# Patient Record
Sex: FEMALE | Race: WHITE | NOT HISPANIC OR LATINO | Employment: OTHER | URBAN - NONMETROPOLITAN AREA
[De-identification: names, ages, dates, MRNs, and addresses within clinical notes are randomized per-mention and may not be internally consistent; named-entity substitution may affect disease eponyms.]

---

## 2017-02-23 ENCOUNTER — HOSPITAL ENCOUNTER (OUTPATIENT)
Dept: MRI IMAGING | Facility: HOSPITAL | Age: 77
Discharge: HOME | End: 2017-02-23
Attending: INTERNAL MEDICINE | Admitting: INTERNAL MEDICINE

## 2017-02-23 ENCOUNTER — TRANSCRIBE ORDERS (OUTPATIENT)
Dept: ADMINISTRATIVE | Facility: HOSPITAL | Age: 77
End: 2017-02-23

## 2017-02-23 DIAGNOSIS — M54.50 ACUTE LOW BACK PAIN WITHOUT SCIATICA, UNSPECIFIED BACK PAIN LATERALITY: Primary | ICD-10-CM

## 2017-02-23 DIAGNOSIS — M54.50 ACUTE LOW BACK PAIN WITHOUT SCIATICA, UNSPECIFIED BACK PAIN LATERALITY: ICD-10-CM

## 2017-02-23 PROCEDURE — 72148 MRI LUMBAR SPINE W/O DYE: CPT

## 2017-02-23 PROCEDURE — 72148 MRI LUMBAR SPINE W/O DYE: CPT | Performed by: RADIOLOGY

## 2017-03-09 RX ORDER — AMLODIPINE BESYLATE 5 MG/1
5 TABLET ORAL DAILY
COMMUNITY
Start: 2016-02-22 | End: 2018-06-29 | Stop reason: DRUGHIGH

## 2017-03-09 RX ORDER — PYRAZINAMIDE 500 MG/1
1 TABLET ORAL ONCE AS NEEDED
COMMUNITY
Start: 2016-12-12 | End: 2018-02-28

## 2017-03-09 RX ORDER — HYDROXYCHLOROQUINE SULFATE 200 MG/1
200 TABLET, FILM COATED ORAL 2 TIMES DAILY
COMMUNITY
Start: 2017-01-30

## 2017-03-09 RX ORDER — LORAZEPAM 0.5 MG/1
TABLET ORAL DAILY PRN
COMMUNITY
Start: 2017-02-16 | End: 2018-02-28

## 2017-03-09 RX ORDER — CETIRIZINE HYDROCHLORIDE 10 MG/1
10 TABLET ORAL DAILY
COMMUNITY
Start: 2016-04-07

## 2017-03-09 RX ORDER — HYDROCHLOROTHIAZIDE 25 MG/1
TABLET ORAL
COMMUNITY
Start: 2016-07-18 | End: 2018-02-28

## 2017-03-09 RX ORDER — GABAPENTIN 300 MG/1
1 CAPSULE ORAL DAILY PRN
COMMUNITY
Start: 2016-07-15 | End: 2018-02-28

## 2017-03-09 RX ORDER — ESCITALOPRAM OXALATE 20 MG/1
20 TABLET ORAL NIGHTLY
COMMUNITY
Start: 2015-08-26

## 2017-03-09 RX ORDER — MONTELUKAST SODIUM 10 MG/1
TABLET ORAL
COMMUNITY
Start: 2016-12-12 | End: 2017-03-10

## 2017-03-09 RX ORDER — PANTOPRAZOLE SODIUM 40 MG/1
40 TABLET, DELAYED RELEASE ORAL 2 TIMES DAILY
COMMUNITY
Start: 2016-08-09

## 2017-03-10 ENCOUNTER — OFFICE VISIT (OUTPATIENT)
Dept: NEUROSURGERY | Facility: CLINIC | Age: 77
End: 2017-03-10

## 2017-03-10 VITALS — HEIGHT: 66 IN | WEIGHT: 137 LBS | BODY MASS INDEX: 22.02 KG/M2 | TEMPERATURE: 97.7 F

## 2017-03-10 DIAGNOSIS — M48.061 SPINAL STENOSIS OF LUMBAR REGION: ICD-10-CM

## 2017-03-10 DIAGNOSIS — M51.36 DEGENERATIVE DISC DISEASE, LUMBAR: ICD-10-CM

## 2017-03-10 DIAGNOSIS — M43.16 SPONDYLOLISTHESIS OF LUMBAR REGION: Primary | ICD-10-CM

## 2017-03-10 DIAGNOSIS — M47.816 FACET ARTHRITIS OF LUMBAR REGION: ICD-10-CM

## 2017-03-10 PROCEDURE — 99203 OFFICE O/P NEW LOW 30 MIN: CPT | Performed by: NEUROLOGICAL SURGERY

## 2017-03-10 RX ORDER — HYDROCODONE BITARTRATE AND ACETAMINOPHEN 5; 325 MG/1; MG/1
1 TABLET ORAL
Refills: 0 | COMMUNITY
Start: 2017-02-24 | End: 2018-02-28

## 2017-03-10 RX ORDER — ESTRADIOL 1 MG/1
1 TABLET ORAL DAILY
Refills: 12 | COMMUNITY
Start: 2017-02-10 | End: 2017-10-31 | Stop reason: SDUPTHER

## 2017-03-10 NOTE — PROGRESS NOTES
Patient: Kelsey Avendano  : 1940    Primary Care Provider: Gunner Christie MD    Requesting Provider: As above        History    Chief Complaint: Mid and low back pain, improved.    History of Present Illness: Ms. Avendano is a 77-year-old retired woman who in the remote past had some very low-grade intermittent back difficulties but her current symptoms began several weeks ago.  She's had low back pain extending up into the mid back.  She harbored some flank pain as well.  The pain even extended into her lower abdominal region.  She's not had any leg symptoms to speak of including pain, weakness, or sensory alteration.  She attributes her symptoms to coming off of her Celebrex presumably due to some renal insufficiency.  She's done physical therapy for a couple weeks and was started on prednisone and her pain is dramatically better.  When present her pain was worse with sweeping, mopping, or standing for long periods of time.    Review of Systems   Constitutional: Negative for activity change, appetite change, chills, diaphoresis, fatigue, fever and unexpected weight change.   HENT: Negative for congestion, dental problem, drooling, ear discharge, ear pain, facial swelling, hearing loss, mouth sores, nosebleeds, postnasal drip, rhinorrhea, sinus pressure, sneezing, sore throat, tinnitus, trouble swallowing and voice change.    Eyes: Negative for photophobia, pain, discharge, redness, itching and visual disturbance.   Respiratory: Negative for apnea, cough, choking, chest tightness, shortness of breath, wheezing and stridor.    Cardiovascular: Negative for chest pain, palpitations and leg swelling.   Gastrointestinal: Negative for abdominal distention, abdominal pain, anal bleeding, blood in stool, constipation, diarrhea, nausea, rectal pain and vomiting.   Endocrine: Negative for cold intolerance, heat intolerance, polydipsia, polyphagia and polyuria.   Genitourinary: Negative for decreased urine volume,  "difficulty urinating, dysuria, enuresis, flank pain, frequency, genital sores, hematuria and urgency.   Musculoskeletal: Positive for arthralgias, back pain, gait problem, joint swelling, myalgias, neck pain and neck stiffness.   Skin: Negative for color change, pallor, rash and wound.   Allergic/Immunologic: Negative for environmental allergies, food allergies and immunocompromised state.   Neurological: Positive for light-headedness. Negative for dizziness, tremors, seizures, syncope, facial asymmetry, speech difficulty, weakness, numbness and headaches.   Hematological: Negative for adenopathy. Does not bruise/bleed easily.   Psychiatric/Behavioral: Negative for agitation, behavioral problems, confusion, decreased concentration, dysphoric mood, hallucinations, self-injury, sleep disturbance and suicidal ideas. The patient is not nervous/anxious and is not hyperactive.        The patient's past medical history, past surgical history, family history, and social history have been reviewed at length in the electronic medical record.    Physical Exam:   Visit Vitals   • Temp 97.7 °F (36.5 °C)   • Ht 66\" (167.6 cm)   • Wt 137 lb (62.1 kg)   • BMI 22.11 kg/m2     CONSTITUTIONAL: Patient is well-nourished, pleasant and appears stated age.  CV: Heart regular rate and rhythm without murmur, rub, or gallop.  PULMONARY: Lungs are clear to ascultation.  MUSCULOSKELETAL:  Straight leg raising is negative.  Richard's Sign is negative.  ROM in back normal.  Tenderness in the back to palpation is not observed.  NEUROLOGICAL:  Orientation, memory, attention span, language function, and cognition have been examined and are intact.  Strength is intact in the lower extremities to direct testing.  Muscle tone is normal throughout.  Station and gait are normal.  Sensation is intact to light touch testing throughout.  Deep tendon reflexes are 1+ and symmetrical except the left ankle reflex which is difficult to elicit.  Coordination is " intact.      Medical Decision Making    Data Review:   MRI of the lumbar spine dated 2/23/2017 reveals multilevel degenerative disc disease.  There is marked disc space narrowing at L2-3.  There is a prominent grade 1 listhesis of L4 on L5 which results in pronounced stenosis.  There is some degree of stenosis at other levels as well.    Diagnosis:   Mechanical low back pain due to extensive degenerative change, now improved.    Treatment Options:   Fortunately, the patient is markedly improved.  She should continue her current medications and therapy.  If her symptoms recur in a substantial fashion particularly when she comes off of her steroids then I've asked that she contact me and I'll make a referral to pain management for an epidural injection or 2.  Surgery on her back should be a last resort.       Diagnosis Plan   1. Spondylolisthesis of lumbar region     2. Spinal stenosis of lumbar region     3. Facet arthritis of lumbar region     4. Degenerative disc disease, lumbar         Scribed for Sudhir Carlos MD by Elvira Jean CMA on 03/10/2017 at 3:06 PM    I, Dr. Carlos, personally performed the services described in the documentation, as scribed in my presence, and it is both accurate and complete.

## 2017-04-13 ENCOUNTER — TRANSCRIBE ORDERS (OUTPATIENT)
Dept: OBSTETRICS AND GYNECOLOGY | Facility: CLINIC | Age: 77
End: 2017-04-13

## 2017-04-13 DIAGNOSIS — Z12.31 VISIT FOR SCREENING MAMMOGRAM: Primary | ICD-10-CM

## 2017-04-21 ENCOUNTER — HOSPITAL ENCOUNTER (OUTPATIENT)
Dept: MAMMOGRAPHY | Facility: HOSPITAL | Age: 77
Discharge: HOME | End: 2017-04-21
Attending: OBSTETRICS & GYNECOLOGY | Admitting: OBSTETRICS & GYNECOLOGY

## 2017-04-21 DIAGNOSIS — Z12.31 VISIT FOR SCREENING MAMMOGRAM: ICD-10-CM

## 2017-04-21 PROCEDURE — G0202 SCR MAMMO BI INCL CAD: HCPCS | Performed by: RADIOLOGY

## 2017-04-21 PROCEDURE — 77063 BREAST TOMOSYNTHESIS BI: CPT | Performed by: RADIOLOGY

## 2017-04-21 PROCEDURE — G0202 SCR MAMMO BI INCL CAD: HCPCS

## 2017-04-21 PROCEDURE — 77063 BREAST TOMOSYNTHESIS BI: CPT

## 2017-04-24 PROBLEM — Z01.419 WELL WOMAN EXAM WITH ROUTINE GYNECOLOGICAL EXAM: Status: ACTIVE | Noted: 2017-04-24

## 2017-10-31 ENCOUNTER — TELEPHONE (OUTPATIENT)
Dept: OBSTETRICS AND GYNECOLOGY | Facility: CLINIC | Age: 77
End: 2017-10-31

## 2017-10-31 RX ORDER — ESTRADIOL 1 MG/1
1 TABLET ORAL DAILY
Qty: 30 TABLET | Refills: 3 | Status: SHIPPED | OUTPATIENT
Start: 2017-10-31 | End: 2018-03-01 | Stop reason: HOSPADM

## 2018-01-26 PROBLEM — N81.6 CYSTOCELE WITH RECTOCELE: Status: ACTIVE | Noted: 2018-01-26

## 2018-01-26 PROBLEM — F32.A DEPRESSION: Status: ACTIVE | Noted: 2018-01-26

## 2018-01-26 PROBLEM — K21.9 GASTROESOPHAGEAL REFLUX DISEASE: Status: ACTIVE | Noted: 2018-01-26

## 2018-01-26 PROBLEM — N81.10 CYSTOCELE WITH RECTOCELE: Status: ACTIVE | Noted: 2018-01-26

## 2018-02-27 ENCOUNTER — HOSPITAL ENCOUNTER (OUTPATIENT)
Facility: HOSPITAL | Age: 78
Setting detail: OBSERVATION
Discharge: HOME | End: 2018-03-01
Attending: EMERGENCY MEDICINE | Admitting: INTERNAL MEDICINE

## 2018-02-27 ENCOUNTER — APPOINTMENT (OUTPATIENT)
Dept: GENERAL RADIOLOGY | Facility: HOSPITAL | Age: 78
End: 2018-02-27

## 2018-02-27 ENCOUNTER — APPOINTMENT (OUTPATIENT)
Dept: CT IMAGING | Facility: HOSPITAL | Age: 78
End: 2018-02-27

## 2018-02-27 DIAGNOSIS — G45.9 TRANSIENT CEREBRAL ISCHEMIA, UNSPECIFIED TYPE: Primary | ICD-10-CM

## 2018-02-27 LAB
BASOPHILS # BLD AUTO: 0.03 10*3/MM3 (ref 0–0.3)
BASOPHILS NFR BLD AUTO: 0.5 % (ref 0–2)
BILIRUB UR QL STRIP: NEGATIVE
CLARITY UR: CLEAR
COLOR UR: YELLOW
DEPRECATED RDW RBC AUTO: 43.1 FL (ref 37–54)
EOSINOPHIL # BLD AUTO: 0.38 10*3/MM3 (ref 0–0.7)
EOSINOPHIL NFR BLD AUTO: 5.9 % (ref 0–7)
ERYTHROCYTE [DISTWIDTH] IN BLOOD BY AUTOMATED COUNT: 13.3 % (ref 11.5–14.5)
GLUCOSE UR STRIP-MCNC: NEGATIVE MG/DL
HCT VFR BLD AUTO: 39.8 % (ref 37–47)
HGB BLD-MCNC: 13 G/DL (ref 12–16)
HGB UR QL STRIP.AUTO: NEGATIVE
IMM GRANULOCYTES # BLD: 0.01 10*3/MM3 (ref 0–0.03)
IMM GRANULOCYTES NFR BLD: 0.2 % (ref 0–0.5)
KETONES UR QL STRIP: NEGATIVE
LEUKOCYTE ESTERASE UR QL STRIP.AUTO: NEGATIVE
LYMPHOCYTES # BLD AUTO: 1.81 10*3/MM3 (ref 1–3)
LYMPHOCYTES NFR BLD AUTO: 28 % (ref 16–46)
MCH RBC QN AUTO: 29.8 PG (ref 27–33)
MCHC RBC AUTO-ENTMCNC: 32.7 G/DL (ref 33–37)
MCV RBC AUTO: 91.3 FL (ref 80–94)
MONOCYTES # BLD AUTO: 0.91 10*3/MM3 (ref 0.1–0.9)
MONOCYTES NFR BLD AUTO: 14.1 % (ref 0–12)
NEUTROPHILS # BLD AUTO: 3.33 10*3/MM3 (ref 1.4–6.5)
NEUTROPHILS NFR BLD AUTO: 51.3 % (ref 40–75)
NITRITE UR QL STRIP: NEGATIVE
PH UR STRIP.AUTO: 6.5 [PH] (ref 5–8)
PLATELET # BLD AUTO: 177 10*3/MM3 (ref 130–400)
PMV BLD AUTO: 10.4 FL (ref 6–10)
PROT UR QL STRIP: NEGATIVE
RBC # BLD AUTO: 4.36 10*6/MM3 (ref 4.2–5.4)
SP GR UR STRIP: <=1.005 (ref 1–1.03)
UROBILINOGEN UR QL STRIP: NORMAL
WBC NRBC COR # BLD: 6.47 10*3/MM3 (ref 4.5–12.5)

## 2018-02-27 PROCEDURE — 85025 COMPLETE CBC W/AUTO DIFF WBC: CPT | Performed by: PHYSICIAN ASSISTANT

## 2018-02-27 PROCEDURE — 99285 EMERGENCY DEPT VISIT HI MDM: CPT

## 2018-02-27 PROCEDURE — 80053 COMPREHEN METABOLIC PANEL: CPT | Performed by: PHYSICIAN ASSISTANT

## 2018-02-27 PROCEDURE — 70450 CT HEAD/BRAIN W/O DYE: CPT | Performed by: RADIOLOGY

## 2018-02-27 PROCEDURE — 82550 ASSAY OF CK (CPK): CPT | Performed by: PHYSICIAN ASSISTANT

## 2018-02-27 PROCEDURE — 93005 ELECTROCARDIOGRAM TRACING: CPT | Performed by: PHYSICIAN ASSISTANT

## 2018-02-27 PROCEDURE — 81003 URINALYSIS AUTO W/O SCOPE: CPT | Performed by: PHYSICIAN ASSISTANT

## 2018-02-27 PROCEDURE — 70450 CT HEAD/BRAIN W/O DYE: CPT

## 2018-02-27 PROCEDURE — 71046 X-RAY EXAM CHEST 2 VIEWS: CPT

## 2018-02-27 PROCEDURE — 82553 CREATINE MB FRACTION: CPT | Performed by: PHYSICIAN ASSISTANT

## 2018-02-27 PROCEDURE — 84484 ASSAY OF TROPONIN QUANT: CPT | Performed by: PHYSICIAN ASSISTANT

## 2018-02-27 PROCEDURE — 71046 X-RAY EXAM CHEST 2 VIEWS: CPT | Performed by: RADIOLOGY

## 2018-02-27 RX ORDER — SODIUM CHLORIDE 0.9 % (FLUSH) 0.9 %
10 SYRINGE (ML) INJECTION AS NEEDED
Status: DISCONTINUED | OUTPATIENT
Start: 2018-02-27 | End: 2018-03-01 | Stop reason: HOSPADM

## 2018-02-27 RX ORDER — ASPIRIN 81 MG/1
324 TABLET, CHEWABLE ORAL ONCE
Status: COMPLETED | OUTPATIENT
Start: 2018-02-27 | End: 2018-02-27

## 2018-02-27 RX ADMIN — ASPIRIN 324 MG: 81 TABLET, CHEWABLE ORAL at 23:31

## 2018-02-28 ENCOUNTER — APPOINTMENT (OUTPATIENT)
Dept: CARDIOLOGY | Facility: HOSPITAL | Age: 78
End: 2018-02-28
Attending: INTERNAL MEDICINE

## 2018-02-28 ENCOUNTER — APPOINTMENT (OUTPATIENT)
Dept: MRI IMAGING | Facility: HOSPITAL | Age: 78
End: 2018-02-28

## 2018-02-28 PROBLEM — G45.9 TIA (TRANSIENT ISCHEMIC ATTACK): Status: ACTIVE | Noted: 2018-02-28

## 2018-02-28 LAB
ALBUMIN SERPL-MCNC: 4.4 G/DL (ref 3.4–4.8)
ALBUMIN/GLOB SERPL: 1.4 G/DL (ref 1.5–2.5)
ALP SERPL-CCNC: 81 U/L (ref 35–104)
ALT SERPL W P-5'-P-CCNC: 29 U/L (ref 10–36)
ANION GAP SERPL CALCULATED.3IONS-SCNC: 8.1 MMOL/L (ref 3.6–11.2)
AST SERPL-CCNC: 51 U/L (ref 10–30)
BH CV ECHO MEAS - % IVS THICK: 8.3 %
BH CV ECHO MEAS - % LVPW THICK: 69.2 %
BH CV ECHO MEAS - ACS: 1.7 CM
BH CV ECHO MEAS - AI DEC SLOPE: 238.5 CM/SEC^2
BH CV ECHO MEAS - AI MAX PG: 60.7 MMHG
BH CV ECHO MEAS - AI MAX VEL: 389.4 CM/SEC
BH CV ECHO MEAS - AI P1/2T: 478.2 MSEC
BH CV ECHO MEAS - AO MAX PG: 8 MMHG
BH CV ECHO MEAS - AO MEAN PG: 4 MMHG
BH CV ECHO MEAS - AO ROOT AREA (BSA CORRECTED): 1.7
BH CV ECHO MEAS - AO ROOT AREA: 7.1 CM^2
BH CV ECHO MEAS - AO ROOT DIAM: 3 CM
BH CV ECHO MEAS - AO V2 MAX: 141.2 CM/SEC
BH CV ECHO MEAS - AO V2 MEAN: 91.5 CM/SEC
BH CV ECHO MEAS - AO V2 VTI: 32.2 CM
BH CV ECHO MEAS - BSA(HAYCOCK): 1.8 M^2
BH CV ECHO MEAS - BSA: 1.8 M^2
BH CV ECHO MEAS - BZI_BMI: 25 KILOGRAMS/M^2
BH CV ECHO MEAS - BZI_METRIC_HEIGHT: 165.1 CM
BH CV ECHO MEAS - BZI_METRIC_WEIGHT: 68 KG
BH CV ECHO MEAS - CONTRAST EF 4CH: 69 ML/M^2
BH CV ECHO MEAS - EDV(CUBED): 125.9 ML
BH CV ECHO MEAS - EDV(MOD-SP4): 71 ML
BH CV ECHO MEAS - EDV(TEICH): 118.9 ML
BH CV ECHO MEAS - EF(CUBED): 82.7 %
BH CV ECHO MEAS - EF(TEICH): 75.3 %
BH CV ECHO MEAS - ESV(CUBED): 21.8 ML
BH CV ECHO MEAS - ESV(MOD-SP4): 22 ML
BH CV ECHO MEAS - ESV(TEICH): 29.4 ML
BH CV ECHO MEAS - FS: 44.3 %
BH CV ECHO MEAS - IVS/LVPW: 0.99
BH CV ECHO MEAS - IVSD: 0.95 CM
BH CV ECHO MEAS - IVSS: 1 CM
BH CV ECHO MEAS - LA DIMENSION: 3.9 CM
BH CV ECHO MEAS - LA/AO: 1.3
BH CV ECHO MEAS - LV DIASTOLIC VOL/BSA (35-75): 40.6 ML/M^2
BH CV ECHO MEAS - LV MASS(C)D: 170.8 GRAMS
BH CV ECHO MEAS - LV MASS(C)DI: 97.6 GRAMS/M^2
BH CV ECHO MEAS - LV MASS(C)S: 115.9 GRAMS
BH CV ECHO MEAS - LV MASS(C)SI: 66.2 GRAMS/M^2
BH CV ECHO MEAS - LV SYSTOLIC VOL/BSA (12-30): 12.6 ML/M^2
BH CV ECHO MEAS - LVIDD: 5 CM
BH CV ECHO MEAS - LVIDS: 2.8 CM
BH CV ECHO MEAS - LVLD AP4: 6.7 CM
BH CV ECHO MEAS - LVLS AP4: 5 CM
BH CV ECHO MEAS - LVOT AREA (M): 2.5 CM^2
BH CV ECHO MEAS - LVOT AREA: 2.6 CM^2
BH CV ECHO MEAS - LVOT DIAM: 1.8 CM
BH CV ECHO MEAS - LVPWD: 0.95 CM
BH CV ECHO MEAS - LVPWS: 1.6 CM
BH CV ECHO MEAS - MV A MAX VEL: 43.4 CM/SEC
BH CV ECHO MEAS - MV E MAX VEL: 96.3 CM/SEC
BH CV ECHO MEAS - MV E/A: 2.2
BH CV ECHO MEAS - PA ACC SLOPE: 1127 CM/SEC^2
BH CV ECHO MEAS - PA ACC TIME: 0.09 SEC
BH CV ECHO MEAS - PA PR(ACCEL): 37.8 MMHG
BH CV ECHO MEAS - RAP SYSTOLE: 10 MMHG
BH CV ECHO MEAS - RVDD: 2.1 CM
BH CV ECHO MEAS - RVSP: 48.3 MMHG
BH CV ECHO MEAS - SI(AO): 131.1 ML/M^2
BH CV ECHO MEAS - SI(CUBED): 59.5 ML/M^2
BH CV ECHO MEAS - SI(MOD-SP4): 28 ML/M^2
BH CV ECHO MEAS - SI(TEICH): 51.1 ML/M^2
BH CV ECHO MEAS - SV(AO): 229.6 ML
BH CV ECHO MEAS - SV(CUBED): 104.1 ML
BH CV ECHO MEAS - SV(MOD-SP4): 49 ML
BH CV ECHO MEAS - SV(TEICH): 89.5 ML
BH CV ECHO MEAS - TR MAX VEL: 309.5 CM/SEC
BILIRUB SERPL-MCNC: 0.4 MG/DL (ref 0.2–1.8)
BUN BLD-MCNC: 20 MG/DL (ref 7–21)
BUN/CREAT SERPL: 20.6 (ref 7–25)
CALCIUM SPEC-SCNC: 9.5 MG/DL (ref 7.7–10)
CHLORIDE SERPL-SCNC: 103 MMOL/L (ref 99–112)
CK MB SERPL-CCNC: 10.96 NG/ML (ref 0–5)
CK MB SERPL-CCNC: 6.16 NG/ML (ref 0–5)
CK MB SERPL-CCNC: 8.01 NG/ML (ref 0–5)
CK MB SERPL-CCNC: 8.33 NG/ML (ref 0–5)
CK MB SERPL-RTO: 2.4 % (ref 0–3)
CK MB SERPL-RTO: 2.5 % (ref 0–3)
CK MB SERPL-RTO: 2.7 % (ref 0–3)
CK MB SERPL-RTO: 2.9 % (ref 0–3)
CK SERPL-CCNC: 261 U/L (ref 24–173)
CK SERPL-CCNC: 306 U/L (ref 24–173)
CK SERPL-CCNC: 317 U/L (ref 24–173)
CK SERPL-CCNC: 375 U/L (ref 24–173)
CO2 SERPL-SCNC: 28.9 MMOL/L (ref 24.3–31.9)
CREAT BLD-MCNC: 0.97 MG/DL (ref 0.43–1.29)
GFR SERPL CREATININE-BSD FRML MDRD: 56 ML/MIN/1.73
GLOBULIN UR ELPH-MCNC: 3.2 GM/DL
GLUCOSE BLD-MCNC: 89 MG/DL (ref 70–110)
HBA1C MFR BLD: 5.6 % (ref 4.5–5.7)
MAXIMAL PREDICTED HEART RATE: 143 BPM
MYOGLOBIN SERPL-MCNC: 116 NG/ML (ref 0–109)
MYOGLOBIN SERPL-MCNC: 147 NG/ML (ref 0–109)
MYOGLOBIN SERPL-MCNC: 176 NG/ML (ref 0–109)
OSMOLALITY SERPL CALC.SUM OF ELEC: 281.5 MOSM/KG (ref 273–305)
POTASSIUM BLD-SCNC: 3.8 MMOL/L (ref 3.5–5.3)
PROT SERPL-MCNC: 7.6 G/DL (ref 6–8)
SODIUM BLD-SCNC: 140 MMOL/L (ref 135–153)
STRESS TARGET HR: 122 BPM
TROPONIN I SERPL-MCNC: 0.01 NG/ML
TROPONIN I SERPL-MCNC: <0.006 NG/ML

## 2018-02-28 PROCEDURE — G8979 MOBILITY GOAL STATUS: HCPCS

## 2018-02-28 PROCEDURE — 70551 MRI BRAIN STEM W/O DYE: CPT

## 2018-02-28 PROCEDURE — 83036 HEMOGLOBIN GLYCOSYLATED A1C: CPT | Performed by: INTERNAL MEDICINE

## 2018-02-28 PROCEDURE — G0378 HOSPITAL OBSERVATION PER HR: HCPCS

## 2018-02-28 PROCEDURE — 97530 THERAPEUTIC ACTIVITIES: CPT

## 2018-02-28 PROCEDURE — 92610 EVALUATE SWALLOWING FUNCTION: CPT

## 2018-02-28 PROCEDURE — 83874 ASSAY OF MYOGLOBIN: CPT | Performed by: INTERNAL MEDICINE

## 2018-02-28 PROCEDURE — 70547 MR ANGIOGRAPHY NECK W/O DYE: CPT | Performed by: RADIOLOGY

## 2018-02-28 PROCEDURE — 84484 ASSAY OF TROPONIN QUANT: CPT | Performed by: INTERNAL MEDICINE

## 2018-02-28 PROCEDURE — 70547 MR ANGIOGRAPHY NECK W/O DYE: CPT

## 2018-02-28 PROCEDURE — G8997 SWALLOW GOAL STATUS: HCPCS

## 2018-02-28 PROCEDURE — 97116 GAIT TRAINING THERAPY: CPT

## 2018-02-28 PROCEDURE — G8996 SWALLOW CURRENT STATUS: HCPCS

## 2018-02-28 PROCEDURE — 97162 PT EVAL MOD COMPLEX 30 MIN: CPT

## 2018-02-28 PROCEDURE — G8980 MOBILITY D/C STATUS: HCPCS

## 2018-02-28 PROCEDURE — 93306 TTE W/DOPPLER COMPLETE: CPT

## 2018-02-28 PROCEDURE — 82553 CREATINE MB FRACTION: CPT | Performed by: INTERNAL MEDICINE

## 2018-02-28 PROCEDURE — 99220 PR INITIAL OBSERVATION CARE/DAY 70 MINUTES: CPT | Performed by: INTERNAL MEDICINE

## 2018-02-28 PROCEDURE — 70551 MRI BRAIN STEM W/O DYE: CPT | Performed by: RADIOLOGY

## 2018-02-28 PROCEDURE — 82550 ASSAY OF CK (CPK): CPT | Performed by: INTERNAL MEDICINE

## 2018-02-28 PROCEDURE — G8978 MOBILITY CURRENT STATUS: HCPCS

## 2018-02-28 PROCEDURE — G8998 SWALLOW D/C STATUS: HCPCS

## 2018-02-28 RX ORDER — ATORVASTATIN CALCIUM 40 MG/1
80 TABLET, FILM COATED ORAL NIGHTLY
Status: DISCONTINUED | OUTPATIENT
Start: 2018-02-28 | End: 2018-03-01 | Stop reason: HOSPADM

## 2018-02-28 RX ORDER — HYDROXYCHLOROQUINE SULFATE 200 MG/1
200 TABLET, FILM COATED ORAL DAILY
Status: DISCONTINUED | OUTPATIENT
Start: 2018-02-28 | End: 2018-03-01 | Stop reason: HOSPADM

## 2018-02-28 RX ORDER — CELECOXIB 200 MG/1
200 CAPSULE ORAL DAILY
Status: DISCONTINUED | OUTPATIENT
Start: 2018-02-28 | End: 2018-03-01 | Stop reason: HOSPADM

## 2018-02-28 RX ORDER — SODIUM CHLORIDE 0.9 % (FLUSH) 0.9 %
1-10 SYRINGE (ML) INJECTION AS NEEDED
Status: DISCONTINUED | OUTPATIENT
Start: 2018-02-28 | End: 2018-03-01 | Stop reason: HOSPADM

## 2018-02-28 RX ORDER — ASPIRIN 81 MG/1
81 TABLET ORAL DAILY
Status: ON HOLD | COMMUNITY
End: 2022-05-09

## 2018-02-28 RX ORDER — ESTRADIOL 1 MG/1
1 TABLET ORAL DAILY
Status: CANCELLED | OUTPATIENT
Start: 2018-02-28

## 2018-02-28 RX ORDER — ACETAMINOPHEN 325 MG/1
650 TABLET ORAL EVERY 4 HOURS PRN
Status: DISCONTINUED | OUTPATIENT
Start: 2018-02-28 | End: 2018-03-01 | Stop reason: HOSPADM

## 2018-02-28 RX ORDER — ACETAMINOPHEN 650 MG/1
650 SUPPOSITORY RECTAL EVERY 4 HOURS PRN
Status: DISCONTINUED | OUTPATIENT
Start: 2018-02-28 | End: 2018-03-01 | Stop reason: HOSPADM

## 2018-02-28 RX ORDER — PANTOPRAZOLE SODIUM 40 MG/1
40 TABLET, DELAYED RELEASE ORAL 2 TIMES DAILY
Status: DISCONTINUED | OUTPATIENT
Start: 2018-02-28 | End: 2018-03-01 | Stop reason: HOSPADM

## 2018-02-28 RX ORDER — CELECOXIB 200 MG/1
200 CAPSULE ORAL DAILY
COMMUNITY
End: 2020-04-13

## 2018-02-28 RX ORDER — ASPIRIN 81 MG/1
81 TABLET ORAL DAILY
Status: DISCONTINUED | OUTPATIENT
Start: 2018-02-28 | End: 2018-03-01 | Stop reason: HOSPADM

## 2018-02-28 RX ORDER — ESCITALOPRAM OXALATE 10 MG/1
20 TABLET ORAL DAILY
Status: DISCONTINUED | OUTPATIENT
Start: 2018-02-28 | End: 2018-03-01 | Stop reason: HOSPADM

## 2018-02-28 RX ORDER — ASPIRIN 81 MG/1
81 TABLET, CHEWABLE ORAL DAILY
COMMUNITY
End: 2018-02-28

## 2018-02-28 RX ORDER — AMLODIPINE BESYLATE 5 MG/1
7.5 TABLET ORAL DAILY
Status: CANCELLED | OUTPATIENT
Start: 2018-02-28

## 2018-02-28 RX ORDER — CETIRIZINE HYDROCHLORIDE 10 MG/1
5 TABLET ORAL DAILY
Status: DISCONTINUED | OUTPATIENT
Start: 2018-02-28 | End: 2018-03-01 | Stop reason: HOSPADM

## 2018-02-28 RX ADMIN — ATORVASTATIN CALCIUM 80 MG: 40 TABLET, FILM COATED ORAL at 20:46

## 2018-02-28 RX ADMIN — METOPROLOL TARTRATE 25 MG: 25 TABLET, FILM COATED ORAL at 08:59

## 2018-02-28 RX ADMIN — ASPIRIN 81 MG: 81 TABLET ORAL at 08:59

## 2018-02-28 RX ADMIN — PANTOPRAZOLE SODIUM 40 MG: 40 TABLET, DELAYED RELEASE ORAL at 09:00

## 2018-02-28 RX ADMIN — CELECOXIB 200 MG: 200 CAPSULE ORAL at 08:59

## 2018-02-28 RX ADMIN — CETIRIZINE HYDROCHLORIDE 5 MG: 10 TABLET ORAL at 08:59

## 2018-02-28 RX ADMIN — ESCITALOPRAM OXALATE 20 MG: 10 TABLET, FILM COATED ORAL at 09:00

## 2018-02-28 RX ADMIN — METOPROLOL TARTRATE 25 MG: 25 TABLET, FILM COATED ORAL at 20:46

## 2018-02-28 RX ADMIN — PANTOPRAZOLE SODIUM 40 MG: 40 TABLET, DELAYED RELEASE ORAL at 20:46

## 2018-02-28 RX ADMIN — HYDROXYCHLOROQUINE SULFATE 200 MG: 200 TABLET, FILM COATED ORAL at 09:00

## 2018-03-01 VITALS
RESPIRATION RATE: 18 BRPM | DIASTOLIC BLOOD PRESSURE: 75 MMHG | HEART RATE: 59 BPM | BODY MASS INDEX: 25.14 KG/M2 | OXYGEN SATURATION: 95 % | SYSTOLIC BLOOD PRESSURE: 152 MMHG | TEMPERATURE: 98.2 F | HEIGHT: 65 IN | WEIGHT: 150.9 LBS

## 2018-03-01 PROBLEM — G45.9 TIA (TRANSIENT ISCHEMIC ATTACK): Status: RESOLVED | Noted: 2018-02-28 | Resolved: 2018-03-01

## 2018-03-01 LAB
GLUCOSE BLDC GLUCOMTR-MCNC: 100 MG/DL (ref 70–130)
GLUCOSE BLDC GLUCOMTR-MCNC: 94 MG/DL (ref 70–130)

## 2018-03-01 PROCEDURE — 97530 THERAPEUTIC ACTIVITIES: CPT

## 2018-03-01 PROCEDURE — G0378 HOSPITAL OBSERVATION PER HR: HCPCS

## 2018-03-01 PROCEDURE — 97116 GAIT TRAINING THERAPY: CPT

## 2018-03-01 PROCEDURE — 82962 GLUCOSE BLOOD TEST: CPT

## 2018-03-01 PROCEDURE — 99217 PR OBSERVATION CARE DISCHARGE MANAGEMENT: CPT | Performed by: INTERNAL MEDICINE

## 2018-03-01 RX ORDER — ATORVASTATIN CALCIUM 80 MG/1
80 TABLET, FILM COATED ORAL NIGHTLY
Qty: 30 TABLET | Refills: 0 | Status: SHIPPED | OUTPATIENT
Start: 2018-03-01 | End: 2018-04-23 | Stop reason: SDUPTHER

## 2018-03-01 RX ADMIN — HYDROXYCHLOROQUINE SULFATE 200 MG: 200 TABLET, FILM COATED ORAL at 11:29

## 2018-03-01 RX ADMIN — ESCITALOPRAM OXALATE 20 MG: 10 TABLET, FILM COATED ORAL at 08:58

## 2018-03-01 RX ADMIN — CELECOXIB 200 MG: 200 CAPSULE ORAL at 08:57

## 2018-03-01 RX ADMIN — PANTOPRAZOLE SODIUM 40 MG: 40 TABLET, DELAYED RELEASE ORAL at 08:57

## 2018-03-01 RX ADMIN — METOPROLOL TARTRATE 25 MG: 25 TABLET, FILM COATED ORAL at 08:56

## 2018-03-01 RX ADMIN — ASPIRIN 81 MG: 81 TABLET ORAL at 08:58

## 2018-03-01 RX ADMIN — CETIRIZINE HYDROCHLORIDE 5 MG: 10 TABLET ORAL at 08:57

## 2018-03-01 NOTE — DISCHARGE SUMMARY
[facility] HOSPITALIST MEDICINE DISCHARGE SUMMARY    Patient Identification:  Name:  Kelsey Avendano  Age:  78 y.o.  Sex:  female  :  1940  MRN:  [id number]  Visit Number:  [id number]    Date of Admission: 2018  Date of Discharge:  3/1/2018     PCP: Gunner Christie MD    DISCHARGE DIAGNOSIS  · TIA  · Hypertension  · H/o CVA   · GERD  · Lupus  · Osteoarthritis  · Chronic diastolic CHF      CONSULTS   Neurology    PROCEDURES PERFORMED  18: 2-D echocardiogram that showed normal LV systolic function with an EF of 60-65%, grade 2 diastolic dysfunction mildly dilated right atrial cavity and moderate pulmonary hypertension  18: MRI of the brain that showed age-appropriate diffused generalized cerebral atrophy with gliosis in the white matter and eval defined old infarct in the cerebellum on the left with no acute areas of ischemia.  Family 2018: MRA of the neck which showed slightly tortuous vessels consistent with atherosclerotic changes and no focal areas of stenosis.  Antegrade flow in both vertebral arteries.  18: CT scan of the head without contrast that showed atrophic and chronic small vessel ischemic changes but no acute intracranial abnormalities.    REASON FOR ADMISSION  Patient is a 78 y.o. female presented to [facility] complaining of numbness.  Please see the admitting history and physical for further details.       HOSPITAL COURSE   Patient was admitted to telemetry with numbness that had resolved by the time patient had presented to the emergency department.  She was started on aspirin and high-dose statin and workup was ordered including MRI, MRA of the neck, 2-D echocardiogram and carotid Doppler study.  Patient had a CT scan done in the emergency department that showed no acute intracranial abnormality.  Patient did not had any further numbness or visual deficits during the course of her hospitalization.  She was found to have an old infarct on  her MRI as dictated above.  Patient was advised to continue aspirin and statin.  She was also found to have diastolic dysfunction on her echocardiogram but no evidence of acute decompensation.  Patient was continued on her home dose of metoprolol which she will continue.  She was able to ambulate with therapy without any problem and did not had any further neurological deficits during the course of hospitalization.  Patient however had complaint of off and on palpitations prior to her admission.  Telemetry rhythm was reviewed and it showed sinus rhythm with few PACs.  Patient is being discharged home in stable condition.  Her estradiol has been discontinued secondary to previous stroke and current TIA.  Patient was advised to monitor her blood pressure, blood glucose and cholesterol closely.  Patient also has been made appointment with cardiology for further evaluation for palpitations.      DISCHARGE DISPOSITION   Stable    DISCHARGE MEDICATIONS:   Kelsey Avendano   Home Medication Instructions [place]:[id number]    Printed on:03/01/18 5778   Medication Information                      amLODIPine (NORVASC) 5 MG tablet  Take 7.5 mg by mouth Daily.             aspirin 81 MG EC tablet  Take 81 mg by mouth Daily.             atorvastatin (LIPITOR) 80 MG tablet  Take 1 tablet by mouth Every Night.             celecoxib (CeleBREX) 200 MG capsule  Take 200 mg by mouth Daily.             cetirizine (zyrTEC) 10 MG tablet  Take 10 mg by mouth Daily.             escitalopram (LEXAPRO) 20 MG tablet  Take 20 mg by mouth Daily.             hydroxychloroquine (PLAQUENIL) 200 MG tablet  Take 200 mg by mouth Daily.             metoprolol tartrate (LOPRESSOR) 25 MG tablet  Take 25 mg by mouth 2 (Two) Times a Day.             pantoprazole (PROTONIX) 40 MG EC tablet  Take 40 mg by mouth 2 (Two) Times a Day.                 Diet Instructions     Diet: Regular       Discharge Diet:  Regular                 Activity Instructions      Activity as Tolerated                   Additional Instructions for the Follow-ups that You Need to Schedule     Discharge Follow-up with PCP    As directed    Follow Up Details:  1 week           Discharge Follow-up with Specified Provider: Cardiology; 2 Weeks    As directed    To:  Cardiology    Follow Up:  2 Weeks    Follow Up Details:  Palpitations, cerebrovascular disease                 Follow-up Information     Follow up with Gunner Christie MD .    Specialty:  Internal Medicine    Why:  1 week    Contact information:    [address] [place], [place] [address]  [facility] [address]  [phone]            TEST  RESULTS PENDING AT DISCHARGE       Gricel Miller MD  03/01/18  12:24 PM    Please note that this discharge summary required more than 30 minutes to complete.    Please send a copy of this dictation to the following providers:    Gunner Christie MD

## 2018-03-01 NOTE — THERAPY EVALUATION
Acute Care - Occupational Therapy Initial Evaluation   [facility]     Patient Name: Kelsey Avendano  : 1940  MRN: [id number]  Today's Date: 3/1/2018  Onset of Illness/Injury or Date of Surgery Date: 18     Referring Physician: Dr. White    Admit Date: 2018       ICD-10-CM ICD-9-CM   1. Transient cerebral ischemia, unspecified type G45.9 435.9     Patient Active Problem List   Diagnosis   • Annual GYN exam w/o problem   • Benign essential hypertension   • Depression   • Systemic lupus erythematosus   • Osteoporosis   • Gastroesophageal reflux disease   • Cystocele with rectocele   • TIA (transient ischemic attack)     Past Medical History:   Diagnosis Date   • Anemia    • Anxiety    • Aortic insufficiency    • Arthritis    • B12 deficiency    • Bradycardia    • Bronchitis    • Depression    • GERD (gastroesophageal reflux disease)    • Hypertension    • Insomnia    • Lupus    • MRSA (methicillin resistant staph aureus) culture positive    • Osteoporosis    • Renal failure      Past Surgical History:   Procedure Laterality Date   • ANTERIOR AND POSTERIOR VAGINAL REPAIR  2008    Along with vaginal vault suspension and PULLP   • BILATERAL SALPINGO OOPHORECTOMY Bilateral    • BREAST BIOPSY Right     benign   • DEEP NECK LYMPH NODE BIOPSY / EXCISION     • KNEE ARTHROPLASTY, PARTIAL REPLACEMENT Bilateral    • TOTAL ABDOMINAL HYSTERECTOMY FORDE PROCEDURE            OT ASSESSMENT FLOWSHEET (last 72 hours)      OT Evaluation       18 1200 18 1701 18 1025 18 0159 18 0156    Rehab Evaluation    Document Type evaluation  -KR evaluation  -BC       Subjective Information no complaints  -KR no complaints;agree to therapy  -BC       Patient Effort, Rehab Treatment  good  -BC       Symptoms Noted During/After Treatment  none  -BC       Symptoms Noted Comment Pt. presents with BUE WFL and reports all occurrances of numbness/incoordination have resolved at this  time. She has returned to independence with self care and transfers. No further skilled OT desired by pt. on acute level.   -KR        General Information    Patient Profile Review  yes  -BC       Onset of Illness/Injury or Date of Surgery Date  02/27/18  -BC       Referring Physician  Dr. White  -BC       General Observations  Pt. supine in bed, awake and alert, granddaughter in room  -BC       Prior Level of Function  independent:;community mobility;all household mobility  -BC       Equipment Currently Used at Home     none  -SS    Living Environment    Lives With  alone  -BC (P)  alone  -HS alone  -SS     Living Arrangements  house  -BC (P)  house  -HS house  -SS     Home Accessibility  no concerns  -BC (P)  no concerns  -HS no concerns  -SS     Stair Railings at Home  none  -BC  none  -     Type of Financial/Environmental Concern  none  -BC  none  -SS     Transportation Available  family or friend will provide  -BC (P)  car;family or friend will provide  -HS family or friend will provide  -SS     Clinical Impression    Therapy Frequency evaluation only  -KR        Functional Level Prior    Ambulation     0-->independent  -SS    Transferring     0-->independent  -SS    Toileting     0-->independent  -SS    Bathing     0-->independent  -SS    Dressing     0-->independent  -SS    Eating     0-->independent  -SS    Communication     0-->understands/communicates without difficulty  -    Swallowing     0-->swallows foods/liquids without difficulty  -    Pain Assessment    Pain Assessment  No/denies pain  -BC       Cognitive Assessment/Intervention    Current Cognitive/Communication Assessment  functional  -BC       Orientation Status  oriented x 4  -BC       Follows Commands/Answers Questions  able to follow single-step instructions  -BC       Personal Safety  WNL/WFL  -BC       Personal Safety Interventions  fall prevention program maintained;muscle strengthening facilitated;gait belt;nonskid shoes/slippers  when out of bed  -BC       ROM (Range of Motion)    General ROM  no range of motion deficits identified  -BC       MMT (Manual Muscle Testing)    General MMT Assessment  no strength deficits identified  -BC       Bed Mobility, Assessment/Treatment    Bed Mobility, Assistive Device  bed rails  -BC       Bed Mobility, Roll Left, [place]  independent  -BC       Bed Mobility, Roll Right, [place]  independent  -BC       Bed Mobility, Scoot/Bridge, [place]  independent  -BC       Bed Mob, Supine to Sit, [place]  independent  -BC       Bed Mob, Sit to Supine, [place]  independent  -BC       Transfer Assessment/Treatment    Transfers, Sit-Stand [place]  independent  -BC       Transfers, Stand-Sit [place]  independent  -BC       Transfers, Sit-Stand-Sit, Assist Device  other (see comments)   none  -BC       Positioning and Restraints    Pre-Treatment Position  in bed  -BC       Post Treatment Position  wheelchair  -BC       In Wheelchair  notified nsg;sitting;call light within reach;encouraged to call for assist   Pt. going for test with transporters  -BC         User Key  (r) = Recorded By, (t) = Taken By, (c) = Cosigned By    Initials Name Effective Dates    BC Shaina Taylor, PT 03/14/16 -     HORTENSIA Martínez OT 03/14/16 -     SS Beverly Skelton, RN 11/18/16 -      Ellen Negron -                 OT Recommendation and Plan  Therapy Frequency: evaluation only                 Outcome Measures       02/28/18 1700          How much help from another person do you currently need...    Turning from your back to your side while in flat bed without using bedrails? 4  -BC      Moving from lying on back to sitting on the side of a flat bed without bedrails? 4  -BC      Moving to and from a bed to a chair (including a wheelchair)? 4  -BC      Standing up from a chair using your arms (e.g., wheelchair, bedside chair)? 4  -BC      Climbing 3-5 steps with a railing? 3  -BC      To walk in hospital  room? 4  -BC      AM-PAC 6 Clicks Score 23  -BC      Functional Assessment    Outcome Measure Options AM-PAC 6 Clicks Basic Mobility (PT)  -BC        User Key  (r) = Recorded By, (t) = Taken By, (c) = Cosigned By    Initials Name Provider Type    BC Shaina Taylor, PT Physical Therapist          Time Calculation:                 Robert Martínez, OT  3/1/2018

## 2018-03-01 NOTE — THERAPY TREATMENT NOTE
Acute Care - Physical Therapy Treatment Note  [facility]     Patient Name: Kelsey Avendano  : 1940  MRN: [id number]  Today's Date: 3/1/2018  Onset of Illness/Injury or Date of Surgery Date: 18  Date of Referral to PT: 18  Referring Physician: Dr. White    Admit Date: 2018    Visit Dx:    ICD-10-CM ICD-9-CM   1. Transient cerebral ischemia, unspecified type G45.9 435.9     Patient Active Problem List   Diagnosis   • Annual GYN exam w/o problem   • Benign essential hypertension   • Depression   • Systemic lupus erythematosus   • Osteoporosis   • Gastroesophageal reflux disease   • Cystocele with rectocele               Adult Rehabilitation Note       18 1527          Rehab Assessment/Intervention    Discipline physical therapist  -BC      Document Type therapy note (daily note)  -BC      Subjective Information no complaints  -BC      Recorded by [BC] Shaina Taylor, PT      Cognitive Assessment/Intervention    Current Cognitive/Communication Assessment functional  -BC      Orientation Status oriented x 4  -BC      Recorded by [BC] Shaina Taylor, PT      Bed Mobility, Assessment/Treatment    Bed Mobility, Assistive Device bed rails  -BC      Bed Mobility, Roll Left, [place] independent  -BC      Bed Mobility, Roll Right, [place] independent  -BC      Bed Mobility, Scoot/Bridge, [place] independent  -BC      Bed Mob, Supine to Sit, [place] independent  -BC      Bed Mob, Sit to Supine, [place] independent  -BC      Recorded by [BC] Shaina Taylor, PT      Transfer Assessment/Treatment    Transfers, Sit-Stand [place] independent  -BC      Transfers, Stand-Sit [place] independent  -BC      Transfers, Sit-Stand-Sit, Assist Device other (see comments)   none  -BC      Recorded by [BC] Shaina Taylor, PT      Gait Assessment/Treatment    Gait, [place] Level conditional independence  -BC      Gait, Assistive Device other (see comments)   none  -BC       Gait, Distance (Feet) 200  -BC      Recorded by [BC] Shaina Taylor PT      Positioning and Restraints    Pre-Treatment Position in bed  -BC      Post Treatment Position chair  -BC      In Chair notified nsg;sitting;call light within reach;encouraged to call for assist;with family/caregiver  -BC      Recorded by [BC] Shaina aTylor PT        User Key  (r) = Recorded By, (t) = Taken By, (c) = Cosigned By    Initials Name Effective Dates    BC Shaina Taylor PT 03/14/16 -               Physical Therapy Education     Title: PT OT SLP Therapies (Resolved)     Topic: Physical Therapy (Resolved)     Point: Mobility training (Resolved)    Learning Progress Summary    Learner Readiness Method Response Comment Documented by Status   Patient Acceptance E [facility] 03/01/18 1100 Done    Acceptance E [facility] 02/28/18 1948 Done    Acceptance E [facility] 02/28/18 1719 Done               Point: Home exercise program (Resolved)    Learning Progress Summary    Learner Readiness Method Response Comment Documented by Status   Patient Acceptance E [facility] 03/01/18 1100 Done    Acceptance E [facility]   02/28/18 1948 Done    Acceptance E [facility]  BC 02/28/18 1719 Done               Point: Body mechanics (Resolved)    Learning Progress Summary    Learner Readiness Method Response Comment Documented by Status   Patient Acceptance E [facility]   03/01/18 1100 Done    Acceptance E [facility] 02/28/18 1948 Done    Acceptance E [facility]  BC 02/28/18 1719 Done               Point: Precautions (Resolved)    Learning Progress Summary    Learner Readiness Method Response Comment Documented by Status   Patient Acceptance E [facility]   03/01/18 1100 Done    Acceptance E [facility]   02/28/18 1948 Done    Acceptance E [facility]U  BC 02/28/18 1719 Done                      User Key     Initials Effective Dates Name Provider Type Discipline    BC 03/14/16 -  Shaina Taylor, PT Physical Therapist PT     04/03/17 -  Liliya Toussaint RN Registered Nurse Nurse     12/28/17 -  Radha LEE  Dave, RN Registered Nurse Nurse                    PT Recommendation and Plan                Outcome Measures       03/01/18 1500 02/28/18 1700       How much help from another person do you currently need...    Turning from your back to your side while in flat bed without using bedrails? 4  -BC 4  -BC     Moving from lying on back to sitting on the side of a flat bed without bedrails? 4  -BC 4  -BC     Moving to and from a bed to a chair (including a wheelchair)? 4  -BC 4  -BC     Standing up from a chair using your arms (e.g., wheelchair, bedside chair)? 4  -BC 4  -BC     Climbing 3-5 steps with a railing? 3  -BC 3  -BC     To walk in hospital room? 4  -BC 4  -BC     AM-PAC 6 Clicks Score 23  -BC 23  -BC     Functional Assessment    Outcome Measure Options AM-PAC 6 Clicks Basic Mobility (PT)  -BC AM-PAC 6 Clicks Basic Mobility (PT)  -BC       User Key  (r) = Recorded By, (t) = Taken By, (c) = Cosigned By    Initials Name Provider Type    FRANCESCO Taylor PT Physical Therapist           Time Calculation:         PT Charges       03/01/18 1530          Time Calculation    Start Time --   30  -BC      PT Received On 03/01/18  -BC        User Key  (r) = Recorded By, (t) = Taken By, (c) = Cosigned By    Initials Name Provider Type    FRANCESCO Taylor PT Physical Therapist          Therapy Charges for Today     Code Description Service Date Service Provider Modifiers Qty    71426317797 HC PT MOBILITY CURRENT 2/28/2018 Shaina Taylor, PT GP, CI 1    06221529099 HC PT MOBILITY PROJECTED 2/28/2018 Shaina Taylor PT GP, CI 1    07343342940 HC PT MOBILITY DISCHARGE 2/28/2018 Shaina Taylor PT GP, CI 1    99996723034 HC GAIT TRAINING EA 15 MIN 2/28/2018 Shaina Taylor, PT GP 1    00625085286 HC PT EVAL MOD COMPLEXITY 2 2/28/2018 Shaina Taylor PT GP 1    16190879912 HC PT THERAPEUTIC ACT EA 15 MIN 2/28/2018 Shaina Taylor, PT GP 1    98890435536 HC PT THER SUPP EA 15 MIN 2/28/2018 Shaina Taylor PT  GP 4    24989320786 HC GAIT TRAINING EA 15 MIN 3/1/2018 Shaina Taylor, PT GP 1    84852606117 HC PT THERAPEUTIC ACT EA 15 MIN 3/1/2018 Shaina Taylor, PT GP 1    93548669394 HC PT THER SUPP EA 15 MIN 3/1/2018 Shaina Taylor, PT GP 2          PT G-Codes  Outcome Measure Options: AM-PAC 6 Clicks Basic Mobility (PT)  Score: 23  Functional Limitation: Mobility: Walking and moving around  Mobility: Walking and Moving Around Current Status (): At least 1 percent but less than 20 percent impaired, limited or restricted  Mobility: Walking and Moving Around Goal Status (): At least 1 percent but less than 20 percent impaired, limited or restricted  Mobility: Walking and Moving Around Discharge Status (): At least 1 percent but less than 20 percent impaired, limited or restricted    Shaina Taylor, PT  3/1/2018

## 2018-03-01 NOTE — PROGRESS NOTES
Discharge Planning Assessment   [facility]     Patient Name: Kelsey Avendano  MRN: [id number]  Today's Date: 3/1/2018    Admit Date: 2/27/2018          Discharge Needs Assessment     None            Discharge Plan       03/01/18 1328    Final Note    Final Note Pt to be discharged home on this date.  No further intervention needed.         Discharge Placement     No information found        Expected Discharge Date and Time     Expected Discharge Date Expected Discharge Time    Mar 1, 2018               Demographic Summary     None            Functional Status     None            Psychosocial     None            Abuse/Neglect     None            Legal     None            Substance Abuse     None            Patient Forms     None          Shanell Odonnell

## 2018-03-22 ENCOUNTER — OFFICE VISIT (OUTPATIENT)
Dept: CARDIOLOGY | Facility: CLINIC | Age: 78
End: 2018-03-22

## 2018-03-22 VITALS
SYSTOLIC BLOOD PRESSURE: 154 MMHG | HEIGHT: 65 IN | DIASTOLIC BLOOD PRESSURE: 70 MMHG | BODY MASS INDEX: 24.76 KG/M2 | HEART RATE: 58 BPM | WEIGHT: 148.6 LBS | RESPIRATION RATE: 18 BRPM

## 2018-03-22 DIAGNOSIS — I10 BENIGN ESSENTIAL HYPERTENSION: ICD-10-CM

## 2018-03-22 DIAGNOSIS — R00.2 PALPITATIONS: Primary | ICD-10-CM

## 2018-03-22 DIAGNOSIS — G45.9 TRANSIENT CEREBRAL ISCHEMIA, UNSPECIFIED TYPE: ICD-10-CM

## 2018-03-22 PROCEDURE — 93000 ELECTROCARDIOGRAM COMPLETE: CPT | Performed by: INTERNAL MEDICINE

## 2018-03-22 PROCEDURE — 99204 OFFICE O/P NEW MOD 45 MIN: CPT | Performed by: INTERNAL MEDICINE

## 2018-03-22 NOTE — PROGRESS NOTES
Gunner Christie MD  Kelsey Avendano  1940 03/22/2018    Patient Active Problem List   Diagnosis   • Annual GYN exam w/o problem   • Benign essential hypertension   • Depression   • Systemic lupus erythematosus   • Osteoporosis   • Gastroesophageal reflux disease   • Cystocele with rectocele       Dear Gunner Christie MD:    Subjective     Kelsey Avendano is a 78 y.o. female with the problems as listed above, presents    History of Present Illness:    Cardiac risk factors:{Causes; risk factors atherosclerosis:45177}    No Known Allergies:      Current Outpatient Prescriptions:   •  amLODIPine (NORVASC) 5 MG tablet, Take 7.5 mg by mouth Daily., Disp: , Rfl:   •  aspirin 81 MG EC tablet, Take 81 mg by mouth Daily., Disp: , Rfl:   •  atorvastatin (LIPITOR) 80 MG tablet, Take 1 tablet by mouth Every Night., Disp: 30 tablet, Rfl: 0  •  celecoxib (CeleBREX) 200 MG capsule, Take 200 mg by mouth Daily., Disp: , Rfl:   •  cetirizine (zyrTEC) 10 MG tablet, Take 10 mg by mouth Daily., Disp: , Rfl:   •  escitalopram (LEXAPRO) 20 MG tablet, Take 20 mg by mouth Daily., Disp: , Rfl:   •  hydroxychloroquine (PLAQUENIL) 200 MG tablet, Take 200 mg by mouth Daily., Disp: , Rfl:   •  metoprolol tartrate (LOPRESSOR) 25 MG tablet, Take 25 mg by mouth 2 (Two) Times a Day., Disp: , Rfl:   •  pantoprazole (PROTONIX) 40 MG EC tablet, Take 40 mg by mouth 2 (Two) Times a Day., Disp: , Rfl:     Past Medical History:   Diagnosis Date   • Anemia    • Anxiety    • Aortic insufficiency    • Arthritis    • B12 deficiency    • Bradycardia    • Bronchitis    • Depression 2006   • GERD (gastroesophageal reflux disease)    • Hypertension    • Insomnia    • Lupus    • MRSA (methicillin resistant staph aureus) culture positive    • Osteoporosis    • Renal failure      Past Surgical History:   Procedure Laterality Date   • ANTERIOR AND POSTERIOR VAGINAL REPAIR  04/2008    Along with vaginal vault suspension and PULLP   • BILATERAL SALPINGO  OOPHORECTOMY Bilateral 1984   • BREAST BIOPSY Right 1989    benign   • DEEP NECK LYMPH NODE BIOPSY / EXCISION  2002   • KNEE ARTHROPLASTY, PARTIAL REPLACEMENT Bilateral    • TOTAL ABDOMINAL HYSTERECTOMY FORDE PROCEDURE  1979     Family History   Problem Relation Age of Onset   • Arthritis Mother    • Diabetes Father    • Breast cancer Neg Hx    • Ovarian cancer Neg Hx      Social History   Substance Use Topics   • Smoking status: Never Smoker   • Smokeless tobacco: Never Used   • Alcohol use No       Review of Systems   Constitution: Positive for malaise/fatigue.   Eyes: Positive for blurred vision.   Cardiovascular: Positive for palpitations.   Respiratory: Positive for cough and wheezing.    Skin: Positive for itching and rash.   Musculoskeletal: Positive for back pain, joint pain, joint swelling, muscle cramps and stiffness.   Gastrointestinal: Positive for heartburn.   Neurological: Positive for headaches.   Psychiatric/Behavioral: Positive for depression.       Objective   There were no vitals taken for this visit.  There is no height or weight on file to calculate BMI.        Physical Exam    Lab Results   Component Value Date     02/27/2018    K 3.8 02/27/2018     02/27/2018    CO2 28.9 02/27/2018    BUN 20 02/27/2018    CREATININE 0.97 02/27/2018    GLUCOSE 89 02/27/2018    CALCIUM 9.5 02/27/2018    AST 51 (H) 02/27/2018    ALT 29 02/27/2018    ALKPHOS 81 02/27/2018    LABIL2 1.4 (L) 02/27/2018     Lab Results   Component Value Date    CKTOTAL 261 (H) 02/28/2018     Lab Results   Component Value Date    WBC 6.47 02/27/2018    HGB 13.0 02/27/2018    HCT 39.8 02/27/2018     02/27/2018     No results found for: INR  No results found for: MG  No results found for: TSH, PSA, CHLPL, TRIG, HDL, LDL   No results found for: BNP    Procedures    Assessment/Plan     1. There are no diagnoses linked to this encounter.         Recommendations:    No orders of the defined types were placed in this  encounter.       1.     No Follow-up on file.    As always, I appreciate very much the opportunity to participate in the cardiovascular care of your patients.      With Best Regards,    Otto Ochoa MD, FACC

## 2018-03-22 NOTE — PROGRESS NOTES
DORIS Senra  Kelsey Avendano  1940 03/22/2018    Patient Active Problem List   Diagnosis   • Annual GYN exam w/o problem   • Benign essential hypertension   • Depression   • Systemic lupus erythematosus   • Osteoporosis   • Gastroesophageal reflux disease   • Cystocele with rectocele   • TIA (transient ischemic attack)   • Palpitations       Dear Kathleen Quiroga APRN:    Subjective     Kelsey Avendano is a 78 y.o. female with the problems as listed above, presents    Chief complaint: For cardiac evaluation after recent transient ischemic attack.    History of Present Illness:Ms. Avendano is a very pleasant 78-year-old  female with history of hypertension, was recently admitted to the hospital with complaints of numbness in the right upper and lower extremities and on February 27, 2018.  This episode lasted for a few minutes and resolve spontaneously.  She was started have a possible transient ischemic attack.  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 No acute areas of ischemia are demonstrated.  She has had some palpitations in the past but none recently.  She has no history of known atrial fibrillation.  She has not had any further episodes of numbness or weakness or any other neurological symptoms since discharge from the hospital.  She has been on aspirin and atorvastatin.  She denies any chest pains or shortness of breath, PND, orthopnea or pedal edema.  She has no history of known coronary artery disease.  Her echo Doppler study recently revealed normal LV chamber size, wall motion and systolic function with grade 2 diastolic noncompliance the left ventricle and no significant valvular abnormalities noted.    Discussed the patient's BMI with her. BMI is within normal parameters. No follow-up required.    No Known Allergies:      Current Outpatient Prescriptions:   •  amLODIPine (NORVASC) 5 MG tablet, Take 5  mg by mouth Daily., Disp: , Rfl:   •  aspirin 81 MG EC tablet, Take 81 mg by mouth Daily., Disp: , Rfl:   •  atorvastatin (LIPITOR) 80 MG tablet, Take 1 tablet by mouth Every Night., Disp: 30 tablet, Rfl: 0  •  celecoxib (CeleBREX) 200 MG capsule, Take 200 mg by mouth Daily., Disp: , Rfl:   •  cetirizine (zyrTEC) 10 MG tablet, Take 10 mg by mouth Daily., Disp: , Rfl:   •  escitalopram (LEXAPRO) 20 MG tablet, Take 20 mg by mouth Daily., Disp: , Rfl:   •  hydroxychloroquine (PLAQUENIL) 200 MG tablet, Take 200 mg by mouth Daily., Disp: , Rfl:   •  metoprolol tartrate (LOPRESSOR) 25 MG tablet, Take 25 mg by mouth 2 (Two) Times a Day., Disp: , Rfl:   •  pantoprazole (PROTONIX) 40 MG EC tablet, Take 40 mg by mouth 2 (Two) Times a Day., Disp: , Rfl:     Past Medical History:   Diagnosis Date   • Anemia    • Anxiety    • Aortic insufficiency    • Arthritis    • B12 deficiency    • Bradycardia    • Bronchitis    • Depression 2006   • GERD (gastroesophageal reflux disease)    • Hypertension    • Insomnia    • Lupus    • MRSA (methicillin resistant staph aureus) culture positive    • Osteoporosis    • Renal failure    • Stroke     TIA     Past Surgical History:   Procedure Laterality Date   • ANTERIOR AND POSTERIOR VAGINAL REPAIR  04/2008    Along with vaginal vault suspension and PULLP   • BILATERAL SALPINGO OOPHORECTOMY Bilateral 1984   • BREAST BIOPSY Right 1989    benign   • CARDIAC CATHETERIZATION  2008   • DEEP NECK LYMPH NODE BIOPSY / EXCISION  2002   • KNEE ARTHROPLASTY, PARTIAL REPLACEMENT Bilateral    • TOTAL ABDOMINAL HYSTERECTOMY FORDE PROCEDURE  1979     Family History   Problem Relation Age of Onset   • Arthritis Mother    • Heart attack Mother    • Diabetes Father    • Heart attack Father    • Heart attack Maternal Grandfather    • Breast cancer Neg Hx    • Ovarian cancer Neg Hx      Social History   Substance Use Topics   • Smoking status: Never Smoker   • Smokeless tobacco: Never Used   • Alcohol use No  "      Review of Systems   Constitution: Positive for malaise/fatigue.   Eyes: Positive for blurred vision.   Cardiovascular: Positive for palpitations.   Skin: Positive for itching.   Musculoskeletal: Positive for back pain, joint pain, joint swelling, muscle cramps and stiffness.   Gastrointestinal: Positive for heartburn.   Neurological: Positive for headaches.   Psychiatric/Behavioral: Positive for depression.       Objective   Blood pressure 154/70, pulse 58, resp. rate 18, height 165.1 cm (65\"), weight 67.4 kg (148 lb 9.6 oz).  Body mass index is 24.73 kg/m².        Physical Exam   Constitutional: She is oriented to person, place, and time. She appears well-developed and well-nourished.   HENT:   Head: Normocephalic.   Eyes: Conjunctivae and EOM are normal.   Neck: Normal range of motion. Neck supple. No JVD present. No tracheal deviation present. No thyromegaly present.   Cardiovascular: Normal rate, regular rhythm, S1 normal and S2 normal.  Exam reveals no gallop, no S3, no S4 and no friction rub.    No murmur heard.  Pulmonary/Chest: Breath sounds normal. No respiratory distress. She has no wheezes. She has no rales.   Abdominal: Soft. Bowel sounds are normal. She exhibits no mass. There is no tenderness.   Musculoskeletal: She exhibits no edema.   Neurological: She is alert and oriented to person, place, and time. No cranial nerve deficit.   Skin: Skin is warm and dry.   Psychiatric: She has a normal mood and affect.       Lab Results   Component Value Date     02/27/2018    K 3.8 02/27/2018     02/27/2018    CO2 28.9 02/27/2018    BUN 20 02/27/2018    CREATININE 0.97 02/27/2018    GLUCOSE 89 02/27/2018    CALCIUM 9.5 02/27/2018    AST 51 (H) 02/27/2018    ALT 29 02/27/2018    ALKPHOS 81 02/27/2018    LABIL2 1.4 (L) 02/27/2018     Lab Results   Component Value Date    CKTOTAL 261 (H) 02/28/2018     Lab Results   Component Value Date    WBC 6.47 02/27/2018    HGB 13.0 02/27/2018    HCT 39.8 " 02/27/2018     02/27/2018         ECG 12 Lead  Date/Time: 3/22/2018 2:58 PM  Performed by: OTTO SMITH  Authorized by: OTTO SMITH               Assessment/Plan   Diagnoses and all orders for this visit:    1. Palpitations.       Cardiac Event Monitor; Future  2.  Transient cerebral ischemia, unspecified type  3.  Benign essential hypertension       Recommendations:    1. We'll arrange an event monitor to look for any evidence of infrequent episodes of atrial fibrillation that might potentially be causing her TIA like symptoms and to see if she would need chronic oral anticoagulation for stroke prevention.  2. Follow-up 1-2 months.    Return in about 2 months (around 5/22/2018).    As always, I appreciate very much the opportunity to participate in the cardiovascular care of your patients.      With Best Regards,    Otto Smith MD, FACC

## 2018-04-16 ENCOUNTER — TELEPHONE (OUTPATIENT)
Dept: CARDIOLOGY | Facility: CLINIC | Age: 78
End: 2018-04-16

## 2018-04-16 NOTE — TELEPHONE ENCOUNTER
Patient states her lipitor has been changed recently to generic and she is having trouble sleeping. Patient wonders if it could be because of the medication change.

## 2018-04-18 NOTE — TELEPHONE ENCOUNTER
Pt called and said she has called once this week about her medication causing her to stake awake during the night, I told her I would send another message on this. Can someone give her a call?

## 2018-04-20 NOTE — TELEPHONE ENCOUNTER
Can send in an order to the pharmacy today brand-name only if that is what she generally takes and  if she is having trouble with the generic.

## 2018-04-23 RX ORDER — ATORVASTATIN CALCIUM 80 MG/1
80 TABLET, FILM COATED ORAL DAILY
Qty: 30 TABLET | Refills: 5 | Status: SHIPPED | OUTPATIENT
Start: 2018-04-23 | End: 2018-05-23

## 2018-04-25 ENCOUNTER — TELEPHONE (OUTPATIENT)
Dept: CARDIOLOGY | Facility: CLINIC | Age: 78
End: 2018-04-25

## 2018-04-25 NOTE — TELEPHONE ENCOUNTER
Pt states she called x3 last week and never heard from us the outcome of her concerns with generic lipitor vs brand name.  Per phone note 4/16/18, and documentation from 4/23/18, the brand name Lipitor was sent to pt's pharmacy.  Pt is now aware and states she will call if there are any other questions or concerns.

## 2018-05-01 DIAGNOSIS — R00.2 PALPITATIONS: ICD-10-CM

## 2018-05-21 ENCOUNTER — OFFICE VISIT (OUTPATIENT)
Dept: CARDIOLOGY | Facility: CLINIC | Age: 78
End: 2018-05-21

## 2018-05-21 VITALS
HEIGHT: 65 IN | SYSTOLIC BLOOD PRESSURE: 157 MMHG | WEIGHT: 148 LBS | DIASTOLIC BLOOD PRESSURE: 66 MMHG | HEART RATE: 61 BPM | BODY MASS INDEX: 24.66 KG/M2

## 2018-05-21 DIAGNOSIS — G45.9 TRANSIENT CEREBRAL ISCHEMIA, UNSPECIFIED TYPE: ICD-10-CM

## 2018-05-21 DIAGNOSIS — I10 BENIGN ESSENTIAL HYPERTENSION: ICD-10-CM

## 2018-05-21 DIAGNOSIS — R00.2 PALPITATIONS: Primary | ICD-10-CM

## 2018-05-21 PROCEDURE — 99213 OFFICE O/P EST LOW 20 MIN: CPT | Performed by: INTERNAL MEDICINE

## 2018-05-21 RX ORDER — CARVEDILOL 12.5 MG/1
12.5 TABLET ORAL 2 TIMES DAILY
Qty: 60 TABLET | Refills: 3 | Status: SHIPPED | OUTPATIENT
Start: 2018-05-21 | End: 2018-06-14

## 2018-05-21 NOTE — PROGRESS NOTES
DORIS Serna  Kelsey Avendano  1940 05/21/2018    Patient Active Problem List   Diagnosis   • Annual GYN exam w/o problem   • Benign essential hypertension   • Depression   • Systemic lupus erythematosus   • Osteoporosis   • Gastroesophageal reflux disease   • Cystocele with rectocele   • TIA (transient ischemic attack)   • Palpitations       Dear Kathleen Quiroga, APRN:    Subjective     Kelsey Avendano is a 78 y.o. female with the problems as listed above, presents    Chief complaint: Follow-up of palpitations and previous history of TIA.    History of Present Illness: Ms. Avendano is a pleasant 78-year-old  female who was recently seen in March 2018 for complaints of palpitations and an episode of TIA prior to that.  She subsequently had an event monitor which did not reveal any evidence of atrial fibrillation.  On today's visit she denies any significant palpitations, dizziness or syncope.  She denies any strokelike symptoms since the last visit.  She denies any chest pains, dyspnea, PND, orthopnea or pedal edema.    No Known Allergies:      Current Outpatient Prescriptions:   •  amLODIPine (NORVASC) 5 MG tablet, Take 5 mg by mouth Daily., Disp: , Rfl:   •  aspirin 81 MG EC tablet, Take 81 mg by mouth Daily., Disp: , Rfl:   •  celecoxib (CeleBREX) 200 MG capsule, Take 200 mg by mouth Daily., Disp: , Rfl:   •  cetirizine (zyrTEC) 10 MG tablet, Take 10 mg by mouth Daily., Disp: , Rfl:   •  escitalopram (LEXAPRO) 20 MG tablet, Take 20 mg by mouth Daily., Disp: , Rfl:   •  hydroxychloroquine (PLAQUENIL) 200 MG tablet, Take 200 mg by mouth Daily., Disp: , Rfl:   •  LIPITOR 80 MG tablet, Take 1 tablet by mouth Daily for 30 days., Disp: 30 tablet, Rfl: 5  •  metoprolol tartrate (LOPRESSOR) 25 MG tablet, Take 25 mg by mouth 2 (Two) Times a Day., Disp: , Rfl:   •  pantoprazole (PROTONIX) 40 MG EC tablet, Take 40 mg by mouth 2 (Two) Times a Day., Disp: , Rfl:       The following portions of  "the patient's history were reviewed and updated as appropriate: allergies, current medications, past family history, past medical history, past social history, past surgical history and problem list.    Social History   Substance Use Topics   • Smoking status: Never Smoker   • Smokeless tobacco: Never Used   • Alcohol use No       Review of Systems   Cardiovascular: Positive for leg swelling (once in awhile R>L; pt states she had surgery on R knee) and palpitations (mostly felt while lying down at night). Negative for chest pain.   Respiratory: Positive for shortness of breath (some).    Neurological: Positive for dizziness (occasional). Negative for light-headedness.       Objective   Vitals:    05/21/18 1326   BP: 157/66   BP Location: Right arm   Patient Position: Sitting   Cuff Size: Adult   Pulse: 61   Weight: 67.1 kg (148 lb)   Height: 165.1 cm (65\")     Body mass index is 24.63 kg/m².        Physical Exam   Constitutional: She is oriented to person, place, and time. She appears well-developed and well-nourished.   HENT:   Mouth/Throat: Oropharynx is clear and moist.   Eyes: EOM are normal. Pupils are equal, round, and reactive to light.   Neck: Neck supple. No JVD present. No tracheal deviation present. No thyromegaly present.   Cardiovascular: Normal rate, regular rhythm, S1 normal and S2 normal.  Exam reveals no gallop and no friction rub.    No murmur heard.  Pulmonary/Chest: Effort normal and breath sounds normal.   Abdominal: Soft. Bowel sounds are normal.   Musculoskeletal: Normal range of motion. She exhibits edema (mild edema on the right leg.).   Lymphadenopathy:     She has no cervical adenopathy.   Neurological: She is alert and oriented to person, place, and time.   Skin: Skin is warm and dry. No rash noted.   Psychiatric: She has a normal mood and affect.       Lab Results   Component Value Date     02/27/2018    K 3.8 02/27/2018     02/27/2018    CO2 28.9 02/27/2018    BUN 20 " 02/27/2018    CREATININE 0.97 02/27/2018    GLUCOSE 89 02/27/2018    CALCIUM 9.5 02/27/2018    AST 51 (H) 02/27/2018    ALT 29 02/27/2018    ALKPHOS 81 02/27/2018    LABIL2 1.4 (L) 02/27/2018     Lab Results   Component Value Date    CKTOTAL 261 (H) 02/28/2018     Lab Results   Component Value Date    WBC 6.47 02/27/2018    HGB 13.0 02/27/2018    HCT 39.8 02/27/2018     02/27/2018     Echo   No results found for: ECHOEFEST  Procedures    Assessment/Plan :     Diagnosis Plan   1. Palpitations     2. Transient cerebral ischemia, unspecified type     3. Benign essential hypertension       Recommendations:    1. We'll discontinue the metoprolol and try carvedilol 12.5 mg by mouth twice a day for hypertension.  2. I have discussed the results of the event monitor with the patient.  Since there is no evidence of atrial Fibrillation so far, we will not initiate any oral anticoagulation at this time.If she has any recurrent TIA like symptoms, then we'll consider internal loop recorder to look for any infrequent episodes of atrial fibrillation.  3. I told the patient to Keep a check on the blood pressure at home and call if it's running more than 150 on the top.    Return in about 3 months (around 8/21/2018) for or sooner if needed.    As always, I appreciate very much the opportunity to participate in the cardiovascular care of your patients.      With Best Regards,    Otto Ochoa MD, [facility]    Dragon disclaimer:  Much of this encounter note is an electronic transcription/translation of spoken language to printed text. The electronic translation of spoken language may permit erroneous, or at times, nonsensical words or phrases to be inadvertently transcribed; Although I have reviewed the note for such errors, some may still exist.

## 2018-06-14 ENCOUNTER — OFFICE VISIT (OUTPATIENT)
Dept: CARDIOLOGY | Facility: CLINIC | Age: 78
End: 2018-06-14

## 2018-06-14 VITALS
HEART RATE: 56 BPM | WEIGHT: 149 LBS | DIASTOLIC BLOOD PRESSURE: 72 MMHG | HEIGHT: 66 IN | SYSTOLIC BLOOD PRESSURE: 168 MMHG | BODY MASS INDEX: 23.95 KG/M2

## 2018-06-14 DIAGNOSIS — I10 BENIGN ESSENTIAL HYPERTENSION: ICD-10-CM

## 2018-06-14 DIAGNOSIS — I10 ESSENTIAL HYPERTENSION: Primary | ICD-10-CM

## 2018-06-14 DIAGNOSIS — G45.9 TRANSIENT CEREBRAL ISCHEMIA, UNSPECIFIED TYPE: ICD-10-CM

## 2018-06-14 PROCEDURE — 99213 OFFICE O/P EST LOW 20 MIN: CPT | Performed by: INTERNAL MEDICINE

## 2018-06-14 PROCEDURE — 93000 ELECTROCARDIOGRAM COMPLETE: CPT | Performed by: INTERNAL MEDICINE

## 2018-06-14 RX ORDER — BISOPROLOL FUMARATE 10 MG/1
10 TABLET, FILM COATED ORAL DAILY
Qty: 90 TABLET | Refills: 3 | Status: SHIPPED | OUTPATIENT
Start: 2018-06-14 | End: 2019-05-22 | Stop reason: SDUPTHER

## 2018-06-14 NOTE — PROGRESS NOTES
Encounter Date:06/14/2018    Location: [facility]    KathleenDORIS Omer    Patient ID: Kelsey Avendano is a 78 y.o. female    1940  Subjective:      Chief Complaint/Reason for visit:    Chief Complaint   Patient presents with   • Irregular Heart Beat   • Shortness of Breath       Problem List:  1.  TIA; on aspirin (not daily); right sided visual deficit   A.  MRI 2/28/18:  Age-appropriate diffuse generalized cerebral atrophy with gliosis in the white matter and ill defined oat infarct in the cerebellum on the left with no acute areas of ischemia   B.  MRA 2/28/18:  Neck, slightly tortuous vessels consistent with atherosclerotic changes and no focal areas of stenosis.  Antegrade flow noted bilaterally.   C.  Echocardiogram, 2/28/18:  EF 60-65%, grade 2 diastolic dysfunction with mildly dilated right atrial cavity is moderate pulmonary hypertension.   D.  Holter 3/2018:  Normal sinus rhythm/sinus bradycardia at 58 bpm  2.  Chronic Diastolic Heart Failure   A.  cardiac catheterization in 2008: Reportedly normal, data insufficient  3.  HTN  4.  Hyperlipidemia  5.  Hx CVA  6.  GERD  7.  Lupus  8.  Surgeries:   A.  partial replacement bilateral knees      HPI:  Patient is a very pleasant 78-year-old female with the above-noted medical history who presents today for evaluation second opinion of her diastolic heart failure and treatment for recent TIA.  She states that she had a TIA in February of this year and was treated at [facility] in [place].  At that time there was no definitive source of where the TIA came from was initiated on high-dose statin with Lipitor 80 mg daily at bedtime.  Her most recent lipid panel available was from September 2017 indicating a total cholesterol of 179, triglycerides of 54, an HDL of 72 and an LDL of 93.  During that admission in [place], she underwent echocardiogram that was within normal limits as noted above.  She also had a Holter monitor performed which indicated no  evidence of atrial fibrillation or arrhythmias as a potential source of her stroke.  She unfortunately did not undergo his JEANETTE for further evaluation, we feel that this would be in the patient's best interest to complete her evaluation is potential cardiac source for her TIA.  She is a very active lady riding her stationary bike for 20 minutes each day without becoming symptomatic with chest pain.  She does admit to having some occasional shortness of breath.  This occurs with and without activity.  She also admits to having ongoing fatigue but states that it is no worse than usual.  She does have a history of chronic diastolic heart failure.  She currently is on Coreg 25 mg twice a day.  We would like to switch her to a more cardioselective beta blocker to see if this gives her any added benefit.  I would like for her to undergo JEANETTE to further assess and have her lipids rechecked in the that she has been on Lipitor for several months.      Cardiac ROS:  Positive for shortness of breath, dyspnea on exertion, fatigue, palpitations and snoring.  Negative for chest pain, orthopnea, PND, fatigue, edema, dizziness, pre-syncope/ syncope, MADDY    Cardiac Risk Factors: advanced age (older than 55 for men, 65 for women), dyslipidemia and hypertension, family history late onset CAD  Social History     Social History   • Marital status:      Spouse name: N/A   • Number of children: N/A   • Years of education: N/A     Occupational History   • Not on file.     Social History Main Topics   • Smoking status: Never Smoker   • Smokeless tobacco: Never Used   • Alcohol use No   • Drug use: No   • Sexual activity: Defer     Other Topics Concern   • Not on file     Social History Narrative   • No narrative on file       family history includes Arthritis in her mother; Diabetes in her father; Heart attack in her father and mother; Heart disease in her brother; Heart failure in her brother, sister, and sister.     has a past  medical history of Anemia; Anxiety; Aortic insufficiency; Arthritis; B12 deficiency; Bradycardia; Bronchitis; Depression (2006); GERD (gastroesophageal reflux disease); Hypertension; Insomnia; Lupus; MRSA (methicillin resistant staph aureus) culture positive; Osteoporosis; Renal failure; and Stroke.    No Known Allergies      Current Outpatient Prescriptions:   •  amLODIPine (NORVASC) 5 MG tablet, Take 5 mg by mouth Daily., Disp: , Rfl:   •  aspirin 81 MG EC tablet, Take 81 mg by mouth Daily., Disp: , Rfl:   •  carvedilol (COREG) 12.5 MG tablet, Take 1 tablet by mouth 2 (Two) Times a Day., Disp: 60 tablet, Rfl: 3  •  celecoxib (CeleBREX) 200 MG capsule, Take 200 mg by mouth Daily., Disp: , Rfl:   •  cetirizine (zyrTEC) 10 MG tablet, Take 10 mg by mouth Daily., Disp: , Rfl:   •  escitalopram (LEXAPRO) 20 MG tablet, Take 20 mg by mouth Daily., Disp: , Rfl:   •  hydroxychloroquine (PLAQUENIL) 200 MG tablet, Take 200 mg by mouth Daily., Disp: , Rfl:   •  pantoprazole (PROTONIX) 40 MG EC tablet, Take 40 mg by mouth 2 (Two) Times a Day., Disp: , Rfl:     Review of Systems   Constitution: Positive for malaise/fatigue and weight gain.   HENT: Positive for tinnitus.    Eyes: Positive for visual halos.   Cardiovascular: Positive for palpitations.   Respiratory: Positive for cough, shortness of breath and snoring.    Endocrine: Negative.    Hematologic/Lymphatic: Negative.    Skin: Negative.    Musculoskeletal: Positive for arthritis.   Gastrointestinal: Positive for heartburn.   Genitourinary: Positive for bladder incontinence.   Neurological: Positive for dizziness.   Psychiatric/Behavioral: Positive for depression.   Allergic/Immunologic: Negative.        Vitals:    06/14/18 1259   BP: 168/72   Pulse: 56          Objective:       Physical Exam   Constitutional: She is oriented to person, place, and time. She appears well-developed and well-nourished.   HENT:   Head: Normocephalic and atraumatic.   Eyes: Pupils are equal,  round, and reactive to light. Right eye exhibits no discharge. Left eye exhibits no discharge.   Neck: Normal range of motion. Neck supple. No JVD present.   Cardiovascular: Normal rate, regular rhythm and intact distal pulses.  Exam reveals no gallop and no friction rub.    No murmur heard.  Pulmonary/Chest: Effort normal and breath sounds normal. She has no wheezes.   Abdominal: Soft. Bowel sounds are normal. There is no tenderness.   Musculoskeletal: Normal range of motion. She exhibits no edema.   Neurological: She is alert and oriented to person, place, and time.   Skin: Skin is warm and dry.   Psychiatric: She has a normal mood and affect. Her behavior is normal.       Data Review:     ECG 12 Lead  Date/Time: 6/14/2018 1:28 PM  Performed by: ALISHA LYNN  Authorized by: ALISHA LYNN   Comparison: not compared with previous ECG   Previous ECG: no previous ECG available  Rhythm: sinus bradycardia  Rate: bradycardic  BPM: 56  QRS axis: normal  Clinical impression: abnormal ECG  Comments: ST & T wave abnormality, consider lateral ischemia        Assessment:      ICD-10-CM ICD-9-CM   1. Essential hypertension I10 401.9       Plan:  Change coreg to Bisoprolol 10mg daily  Cut Lipitor to 40mg qhs   JEANETTE with lipids same day  F/up in 6 months or sooner if needed.        Scribed for Kailee Birmingham MD by DORIS Tobias. 6/14/2018  1:32 PM    IKailee MD, personally performed the services described in this documentation as scribed by the above named individual in my presence, and it is both accurate and complete.  6/21/2018  2:55 PM    Kailee Birmingham MD, [facility]

## 2018-06-26 ENCOUNTER — TELEPHONE (OUTPATIENT)
Dept: CARDIOLOGY | Facility: CLINIC | Age: 78
End: 2018-06-26

## 2018-06-26 NOTE — TELEPHONE ENCOUNTER
PT seen 6/14/18- stopped coreg- started Bisoprolol 10 mg daily- PT got her meds confused and started off taking 10 mg BID- for the past 5-6 days she has been taking correctly at 10 mg daily-     /83 HR 56 (has been staying in the 50's)    PT states that overall, she does feel better with the switch to Bisoprolol but her BP is staying 140-150's/70-80    Also taking Norvasc 5 mg Daily    Any changes?

## 2018-06-29 RX ORDER — AMLODIPINE BESYLATE 10 MG/1
10 TABLET ORAL DAILY
Qty: 90 TABLET | Refills: 3 | Status: SHIPPED | OUTPATIENT
Start: 2018-06-29 | End: 2019-08-20

## 2018-06-29 NOTE — TELEPHONE ENCOUNTER
Per Dr. Birmingham, increase Amlodipine to 10 mg daily- she will call me in 2 weeks with BP/HR readings- she also mentioned that she is still experiencing leg/muscle cramps on Atorvastatin 40 mg daily- will have her to decrease to every other day and she will update me in 2 weeks when she calls back with bp update-

## 2018-07-03 ENCOUNTER — APPOINTMENT (OUTPATIENT)
Dept: CARDIOLOGY | Facility: HOSPITAL | Age: 78
End: 2018-07-03

## 2018-07-10 ENCOUNTER — APPOINTMENT (OUTPATIENT)
Dept: CARDIOLOGY | Facility: HOSPITAL | Age: 78
End: 2018-07-10

## 2018-11-01 DIAGNOSIS — G45.9 TIA (TRANSIENT ISCHEMIC ATTACK): Primary | ICD-10-CM

## 2018-11-06 ENCOUNTER — APPOINTMENT (OUTPATIENT)
Dept: CARDIOLOGY | Facility: HOSPITAL | Age: 78
End: 2018-11-06

## 2018-11-06 ENCOUNTER — HOSPITAL ENCOUNTER (OUTPATIENT)
Dept: CARDIOLOGY | Facility: HOSPITAL | Age: 78
Discharge: HOME | End: 2018-11-06
Attending: INTERNAL MEDICINE | Admitting: INTERNAL MEDICINE

## 2018-11-06 ENCOUNTER — HOSPITAL ENCOUNTER (OUTPATIENT)
Dept: CARDIOLOGY | Facility: HOSPITAL | Age: 78
End: 2018-11-06

## 2018-11-06 VITALS — SYSTOLIC BLOOD PRESSURE: 121 MMHG | OXYGEN SATURATION: 97 % | HEART RATE: 49 BPM | DIASTOLIC BLOOD PRESSURE: 45 MMHG

## 2018-11-06 DIAGNOSIS — G45.9 TIA (TRANSIENT ISCHEMIC ATTACK): ICD-10-CM

## 2018-11-06 LAB
BH CV ECHO MEAS - RVSP: 40 MMHG
BH CV VAS BP LEFT ARM: NORMAL MMHG
LV EF 2D ECHO EST: 60 %

## 2018-11-06 PROCEDURE — 93320 DOPPLER ECHO COMPLETE: CPT

## 2018-11-06 PROCEDURE — 99153 MOD SED SAME PHYS/QHP EA: CPT

## 2018-11-06 PROCEDURE — 93325 DOPPLER ECHO COLOR FLOW MAPG: CPT | Performed by: INTERNAL MEDICINE

## 2018-11-06 PROCEDURE — 93325 DOPPLER ECHO COLOR FLOW MAPG: CPT

## 2018-11-06 PROCEDURE — 93321 DOPPLER ECHO F-UP/LMTD STD: CPT

## 2018-11-06 PROCEDURE — 93320 DOPPLER ECHO COMPLETE: CPT | Performed by: INTERNAL MEDICINE

## 2018-11-06 PROCEDURE — 93312 ECHO TRANSESOPHAGEAL: CPT

## 2018-11-06 PROCEDURE — 25010000002 MIDAZOLAM PER 1 MG: Performed by: INTERNAL MEDICINE

## 2018-11-06 PROCEDURE — 99152 MOD SED SAME PHYS/QHP 5/>YRS: CPT

## 2018-11-06 PROCEDURE — 25010000002 FENTANYL CITRATE (PF) 100 MCG/2ML SOLUTION: Performed by: INTERNAL MEDICINE

## 2018-11-06 PROCEDURE — 93312 ECHO TRANSESOPHAGEAL: CPT | Performed by: INTERNAL MEDICINE

## 2018-11-06 RX ORDER — FENTANYL CITRATE 50 UG/ML
INJECTION, SOLUTION INTRAMUSCULAR; INTRAVENOUS
Status: COMPLETED | OUTPATIENT
Start: 2018-11-06 | End: 2018-11-06

## 2018-11-06 RX ORDER — MIDAZOLAM HYDROCHLORIDE 1 MG/ML
INJECTION INTRAMUSCULAR; INTRAVENOUS
Status: COMPLETED | OUTPATIENT
Start: 2018-11-06 | End: 2018-11-06

## 2018-11-06 RX ADMIN — FENTANYL CITRATE 50 MCG: 50 INJECTION, SOLUTION INTRAMUSCULAR; INTRAVENOUS at 10:06

## 2018-11-06 RX ADMIN — FENTANYL CITRATE 50 MCG: 50 INJECTION, SOLUTION INTRAMUSCULAR; INTRAVENOUS at 10:09

## 2018-11-06 RX ADMIN — MIDAZOLAM HYDROCHLORIDE 2 MG: 1 INJECTION, SOLUTION INTRAMUSCULAR; INTRAVENOUS at 10:09

## 2018-11-06 RX ADMIN — MIDAZOLAM HYDROCHLORIDE 2 MG: 1 INJECTION, SOLUTION INTRAMUSCULAR; INTRAVENOUS at 10:06

## 2018-11-06 NOTE — H&P
"[facility] Consult Note      Referring Provider: Kailee Birmingham,*  Primary Provider:  Kathleen Quiroga APRN  Reason for Consultation:  TIA and Hypertension    Patient Care Team:  Kathleen Quiroga APRN as PCP - General (Family Medicine)  Gunner Christie MD as PCP - Family Medicine  Akhil Nickerson MD as Consulting Physician (Rheumatology)    Chief complaint:  TIA and hypertension    Identification:  78 year old female    Problem list:    1.  TIA; on aspirin (not daily); right sided visual deficit              A.  MRI 2/28/18:  Age-appropriate diffuse generalized cerebral atrophy with gliosis in the white matter and ill defined oat infarct in the cerebellum on the left with no acute areas of ischemia              B.  MRA 2/28/18:  Neck, slightly tortuous vessels consistent with atherosclerotic changes and no focal areas of stenosis.  Antegrade flow noted bilaterally.              C.  Echocardiogram, 2/28/18:  EF 60-65%, grade 2 diastolic dysfunction with mildly dilated right atrial cavity is moderate pulmonary hypertension.              D.  Holter 3/2018:  Normal sinus rhythm/sinus bradycardia at 58 bpm.     E.  Event monitor 5/2018:  Mostly sinus with occ PAC's, PVC's.  Brief episode of \"an atrial run\" (Dr Ochoa)  2.  Chronic Diastolic Heart Failure              A.  cardiac catheterization in 2008: Reportedly normal, data insufficient  3.  HTN  4.  Hyperlipidemia  5.  Hx CVA  6.  GERD  7.  Lupus  8.  Surgeries:              A.  partial replacement bilateral knees    Allergies:  Patient has no known allergies.    Home/Current Medications:    1.  Amlodipine 10mg daily  2.  Bisoprolol 10mg daily  3.  Celebrex 200mg daily  4.  Cetirizine 10mg daily  5.  Lexapro 20mg daily  6.  Plaquenil 200mg daily  7.  Protonix 40mg BID  8.  Lipitor 40mg QOD      History of present illness:    Patient is a very pleasant 78-year-old female with the above-noted medical history who presents today for JEANETTE to " further assess possible cardiac source for her recent TIA back in February of this year.  There was not an obvious source noted at that time.  Since we saw her last, in June, she has noticed an increase in bilateral lower extremity edema.  She notes that she was taken off her HCTZ due to elevated creatinine levels and there was also an increase in her amlodipine dose up to 10 mg daily.  However, her creatinine in August was 0.87.  She states that her blood pressure  run in the 140s and sometimes 150s consistently.  She states that her heart rate tends to run in the 50s on a regular basis and does not seem to be bothered by this.  She does try to remain active by riding her stationary bike for 20 minutes daily without becoming symptomatic.  She has had no further recurrence of her TIA symptoms; which was primarily blurred vision.        Cardiac Risk Factors:  Hypertension, hyperlipidemia, advanced age female, family history CAD (late onset)    Social History:  Social History     Social History   • Marital status:      Spouse name: N/A   • Number of children: N/A   • Years of education: N/A     Occupational History   • Not on file.     Social History Main Topics   • Smoking status: Never Smoker   • Smokeless tobacco: Never Used   • Alcohol use No   • Drug use: No   • Sexual activity: Defer     Other Topics Concern   • Not on file     Social History Narrative   • No narrative on file     Family History:    Review of Systems  Pertinent positives are listed in the HPI.  All other systems reviewed are negative.         Objective     Vital Sign Min/Max for last 24 hours  No Data Recorded   BP  Min: 141/74  Max: 141/74   Pulse  Min: 53  Max: 53   No Data Recorded   SpO2  Min: 94 %  Max: 94 %   No Data Recorded   No Data Recorded         Physical Exam:    GENERAL: well-developed, well-nourished; in no acute distress.   NECK:  Carotid upstrokes are 2+ and  symmetrical without bruits.   LUNGS: Clear to  auscultation bilaterally without wheezing, rhonchi, or rales noted.   CARDIOVASCULAR: The heart has a bradycardic regular rate with a normal S1 and S2. There is no murmur, gallop, rub, or click appreciated. The PMI is nondisplaced.   ABDOMEN: Soft and nontender  NEUROLOGICAL: Nonfocal; Alert and oriented  PERIPHERAL VASCULAR:  Posterior tibial pulses are 2+ and symmetrical. There is no peripheral edema.   SKIN:  Warm and dry  PSYCHIATRIC: normal mood and affect; behavior appropriate     EKG:  Sinus danielle at 52    Echocardiogram 2/28/18:  · Left ventricular systolic function is normal. Estimated EF = 60-65%.  · All left ventricular wall segments contract normally.  · Left ventricular diastolic dysfunction (grade II) consistent with pseudonormalization.  · Right atrial cavity size is mildly dilated  · Mild aortic valve regurgitation is present.  · Mild mitral valve regurgitation is present  · Mild to moderate tricuspid valve regurgitation is present.  · Moderate pulmonary hypertension is present. RVSP 45-55  · There is no evidence of pericardial effusion    Labs:      Lipids 8/2018:  LDL 49, HDL 52 with Total 115         Ejection Fraction:  Normal per echo 2/2018      Results Review:  I reviewed the patients new clinical results.      Assessment:  1.  Hypertension   -still not quite at goal  2.  TIA hx   -no further recurrence   -no obvious source determined  3.  Hyperlipidemia   -well controlled  4.        Plan:    JEANETTE today with Dr. Birmingham to further assess for potential cardiac source of TIA earlier this year.  The risks, benefits, potential complications of all been discussed with the patient and she is agreeable to proceed.    I discussed the patients findings and my recommendations with patient.    Scribed for Kailee Birmingham MD by DORIS Tobias on 11/06/2018 at 9:11 AM     DORIS Zamora  11/06/18  9:32 AM    I Kailee Birmingham MD personally performed the services described in this  documentation as scribed by the above individual in my presence, and it is both accurate and complete.    Kailee Birmingham MD, FACC

## 2018-12-19 ENCOUNTER — OFFICE VISIT (OUTPATIENT)
Dept: CARDIOLOGY | Facility: CLINIC | Age: 78
End: 2018-12-19

## 2018-12-19 VITALS
HEART RATE: 63 BPM | BODY MASS INDEX: 22.53 KG/M2 | HEIGHT: 66 IN | WEIGHT: 140.2 LBS | DIASTOLIC BLOOD PRESSURE: 52 MMHG | SYSTOLIC BLOOD PRESSURE: 128 MMHG

## 2018-12-19 DIAGNOSIS — I10 ESSENTIAL HYPERTENSION: ICD-10-CM

## 2018-12-19 DIAGNOSIS — R00.2 PALPITATIONS: Primary | ICD-10-CM

## 2018-12-19 DIAGNOSIS — Z86.73 HISTORY OF TIA (TRANSIENT ISCHEMIC ATTACK): ICD-10-CM

## 2018-12-19 PROCEDURE — 99214 OFFICE O/P EST MOD 30 MIN: CPT | Performed by: INTERNAL MEDICINE

## 2018-12-19 RX ORDER — LISINOPRIL 20 MG/1
20 TABLET ORAL DAILY
Qty: 90 TABLET | Refills: 3 | Status: SHIPPED | OUTPATIENT
Start: 2018-12-19 | End: 2019-02-26 | Stop reason: ALTCHOICE

## 2018-12-19 RX ORDER — ATORVASTATIN CALCIUM 40 MG/1
40 TABLET, FILM COATED ORAL DAILY
COMMUNITY
End: 2019-08-20

## 2018-12-19 NOTE — PROGRESS NOTES
Kelsey Avendano  1940  78 y.o.  [phone]  [phone]      12/19/2018    Kathleen Quiroga APRN    Chief Complaint   Patient presents with   • Palpitations   • Hypertension       Problem List:  1. TIA; on aspirin (not daily); right sided visual deficit:  a. MRI, 02/28/2018: Age-appropriate diffuse generalized cerebral atrophy with gliosis in the white matter and ill defined oat infarct in the cerebellum on the left with no acute areas of ischemia  b. MRA, 02/28/2018: Neck, slightly tortuous vessels consistent with atherosclerotic changes and no focal areas of stenosis. Antegrade flow noted bilaterally.  c. Echocardiogram, 02/28/2018: EF 60-65%, grade 2 diastolic dysfunction with mildly dilated right atrial cavity is moderate pulmonary hypertension.  d. Holter 3/2018: Normal sinus rhythm/sinus bradycardia at 58 bpm.  e. JEANETTE, 11/06/2018: EF 60%. Moderate AI, aortic valve leaflets appear rheumatic. Mild-to-moderate MR and TR. Most likely source of patient's TIA is aortic atheroma.  2. Chronic Diastolic Heart Failure  a. Cardiac catheterization in 2008: Reportedly normal, data insufficient  3. Hypertension.  4. Hyperlipidemia.  5. Hx CVA.  6. GERD.  7. Lupus.  8. Surgeries:  a. Partial replacement bilateral knees.    No Known Allergies    Current Medications:      Current Outpatient Medications:   •  amLODIPine (NORVASC) 10 MG tablet, Take 1 tablet by mouth Daily., Disp: 90 tablet, Rfl: 3  •  aspirin 81 MG EC tablet, Take 81 mg by mouth Daily., Disp: , Rfl:   •  atorvastatin (LIPITOR) 40 MG tablet, Take 40 mg by mouth Daily., Disp: , Rfl:   •  bisoprolol (ZEBETA) 10 MG tablet, Take 1 tablet by mouth Daily., Disp: 90 tablet, Rfl: 3  •  celecoxib (CeleBREX) 200 MG capsule, Take 200 mg by mouth Daily., Disp: , Rfl:   •  cetirizine (zyrTEC) 10 MG tablet, Take 10 mg by mouth Daily., Disp: , Rfl:   •  escitalopram (LEXAPRO) 20 MG tablet, Take 20 mg by mouth Daily., Disp: , Rfl:   •  hydroxychloroquine  "(PLAQUENIL) 200 MG tablet, Take 200 mg by mouth Daily., Disp: , Rfl:   •  pantoprazole (PROTONIX) 40 MG EC tablet, Take 40 mg by mouth Daily., Disp: , Rfl:     HPI    Kelsey Avendano is a 78 y.o. female who presents today for 6 month follow up of palpitations, TIA, and  hypertension. Since last visit, she has been doing well overall from a cardiovascular standpoint, though she does note some shortness of breath. She reports she has \"always had a problem with bronchitis\". She experiences shortness of breath when walking, and this is made slightly worse by cold weather. She also notes some lower extremity edema when standing, which she is reportedly not usual for her. She recently had an infection in her mouth following dental work she is getting done. Patient denies chest pain, palpitations, dizziness, syncope, and stroke-like symptoms.     The following portions of the patient's history were reviewed and updated as appropriate: allergies, current medications and problem list.    Pertinent positives as listed in the HPI.  All other systems reviewed are negative.    Vitals:    12/19/18 1401   BP: 128/52   BP Location: Right arm   Patient Position: Sitting   Pulse: 63   Weight: 63.6 kg (140 lb 3.2 oz)   Height: 166.4 cm (65.5\")       Physical Exam:    General: Alert and oriented  Neck: Jugular venous pressure is within normal limits. Carotids have normal upstrokes without bruits.   Cardiovascular: Heart has a nondisplaced focal PMI. Regular rate and rhythm without murmur, gallop or rub.  Lungs: Clear without rales or wheezes. Equal expansion is noted.   Extremities: Show no edema.  Skin: warm and dry.  Neurologic: nonfocal    Diagnostic Data:    Lipid panel, 08/22/2018:  Cholesterol 115  Triglycerides 60  HDL 52  LDL 49    CMP, 08/22/2018:  Glucose 89  BUN 27 (H)  Creatinine 0.87  eGFR 64  BCR 31 (H)  Na 141  K 4.0  Cl 107  CO2 28  Ca 9.1  Protein, total 6.7  Albumin 4.0   Globulin 2.7  Bilirubin, total 0.7  Alk Phos " 67  AST 32  ALT 20    Procedures    Assessment:      ICD-10-CM ICD-9-CM   1. Palpitations R00.2 785.1   2. Essential hypertension I10 401.9   3. History of TIA (transient ischemic attack) Z86.73 V12.54   4.      Lower extremity edema    Plan:    1. Echocardiogram in 2 years to reassess valve anatomy and function.  2. DC amlodipine as patient is experiencing lower extremity edema, and start lisinopril 20 mg to continue managing hypertension. Continue bisoprolol.  3. Continue atorvastatin 40 mg for hyperlipidemia and prevention of TIA  4. Continue aspirin 81 mg for anticoagulation with history of TIA.  5. Continue all other current medications.  6. F/up in 12 months or sooner if needed.    Scribed for Kailee Birmingham MD by Khadijah Lee. 12/19/2018  2:15 PM     I Kailee Birmingham MD personally performed the services described in this documentation as scribed by the above individual in my presence, and it is both accurate and complete.    Kailee Birmingham MD, FACC

## 2019-02-11 ENCOUNTER — APPOINTMENT (OUTPATIENT)
Dept: GENERAL RADIOLOGY | Facility: HOSPITAL | Age: 79
End: 2019-02-11

## 2019-02-11 ENCOUNTER — HOSPITAL ENCOUNTER (EMERGENCY)
Facility: HOSPITAL | Age: 79
Discharge: HOME | End: 2019-02-12
Attending: EMERGENCY MEDICINE | Admitting: EMERGENCY MEDICINE

## 2019-02-11 ENCOUNTER — NURSE TRIAGE (OUTPATIENT)
Dept: CALL CENTER | Facility: HOSPITAL | Age: 79
End: 2019-02-11

## 2019-02-11 DIAGNOSIS — I10 HYPERTENSION, UNSPECIFIED TYPE: Primary | ICD-10-CM

## 2019-02-11 LAB
ALBUMIN SERPL-MCNC: 4 G/DL (ref 3.4–4.8)
ALBUMIN/GLOB SERPL: 1.4 G/DL (ref 1.5–2.5)
ALP SERPL-CCNC: 78 U/L (ref 35–104)
ALT SERPL W P-5'-P-CCNC: 25 U/L (ref 10–36)
ANION GAP SERPL CALCULATED.3IONS-SCNC: 7.3 MMOL/L (ref 3.6–11.2)
AST SERPL-CCNC: 34 U/L (ref 10–30)
BASOPHILS # BLD AUTO: 0.02 10*3/MM3 (ref 0–0.3)
BASOPHILS NFR BLD AUTO: 0.4 % (ref 0–2)
BILIRUB SERPL-MCNC: 0.4 MG/DL (ref 0.2–1.8)
BUN BLD-MCNC: 26 MG/DL (ref 7–21)
BUN/CREAT SERPL: 26 (ref 7–25)
CALCIUM SPEC-SCNC: 9.1 MG/DL (ref 7.7–10)
CHLORIDE SERPL-SCNC: 103 MMOL/L (ref 99–112)
CO2 SERPL-SCNC: 28.7 MMOL/L (ref 24.3–31.9)
CREAT BLD-MCNC: 1 MG/DL (ref 0.43–1.29)
DEPRECATED RDW RBC AUTO: 45.1 FL (ref 37–54)
EOSINOPHIL # BLD AUTO: 0.25 10*3/MM3 (ref 0–0.7)
EOSINOPHIL NFR BLD AUTO: 5.3 % (ref 0–7)
ERYTHROCYTE [DISTWIDTH] IN BLOOD BY AUTOMATED COUNT: 13.7 % (ref 11.5–14.5)
GFR SERPL CREATININE-BSD FRML MDRD: 54 ML/MIN/1.73
GLOBULIN UR ELPH-MCNC: 2.9 GM/DL
GLUCOSE BLD-MCNC: 104 MG/DL (ref 70–110)
HCT VFR BLD AUTO: 37.5 % (ref 37–47)
HGB BLD-MCNC: 12.1 G/DL (ref 12–16)
IMM GRANULOCYTES # BLD AUTO: 0 10*3/MM3 (ref 0–0.03)
IMM GRANULOCYTES NFR BLD AUTO: 0 % (ref 0–0.5)
LYMPHOCYTES # BLD AUTO: 1.19 10*3/MM3 (ref 1–3)
LYMPHOCYTES NFR BLD AUTO: 25 % (ref 16–46)
MCH RBC QN AUTO: 29.9 PG (ref 27–33)
MCHC RBC AUTO-ENTMCNC: 32.3 G/DL (ref 33–37)
MCV RBC AUTO: 92.6 FL (ref 80–94)
MONOCYTES # BLD AUTO: 0.73 10*3/MM3 (ref 0.1–0.9)
MONOCYTES NFR BLD AUTO: 15.3 % (ref 0–12)
NEUTROPHILS # BLD AUTO: 2.57 10*3/MM3 (ref 1.4–6.5)
NEUTROPHILS NFR BLD AUTO: 54 % (ref 40–75)
OSMOLALITY SERPL CALC.SUM OF ELEC: 282.6 MOSM/KG (ref 273–305)
PLATELET # BLD AUTO: 148 10*3/MM3 (ref 130–400)
PMV BLD AUTO: 11 FL (ref 6–10)
POTASSIUM BLD-SCNC: 3.8 MMOL/L (ref 3.5–5.3)
PROT SERPL-MCNC: 6.9 G/DL (ref 6–8)
RBC # BLD AUTO: 4.05 10*6/MM3 (ref 4.2–5.4)
SODIUM BLD-SCNC: 139 MMOL/L (ref 135–153)
TROPONIN I SERPL-MCNC: 0.01 NG/ML
WBC NRBC COR # BLD: 4.76 10*3/MM3 (ref 4.5–12.5)

## 2019-02-11 PROCEDURE — 99284 EMERGENCY DEPT VISIT MOD MDM: CPT

## 2019-02-11 PROCEDURE — 71046 X-RAY EXAM CHEST 2 VIEWS: CPT | Performed by: RADIOLOGY

## 2019-02-11 PROCEDURE — 71046 X-RAY EXAM CHEST 2 VIEWS: CPT

## 2019-02-11 PROCEDURE — 93010 ELECTROCARDIOGRAM REPORT: CPT | Performed by: INTERNAL MEDICINE

## 2019-02-11 PROCEDURE — 85025 COMPLETE CBC W/AUTO DIFF WBC: CPT | Performed by: PHYSICIAN ASSISTANT

## 2019-02-11 PROCEDURE — 93005 ELECTROCARDIOGRAM TRACING: CPT | Performed by: PHYSICIAN ASSISTANT

## 2019-02-11 PROCEDURE — 84484 ASSAY OF TROPONIN QUANT: CPT | Performed by: PHYSICIAN ASSISTANT

## 2019-02-11 PROCEDURE — 80053 COMPREHEN METABOLIC PANEL: CPT | Performed by: PHYSICIAN ASSISTANT

## 2019-02-11 RX ORDER — HYDRALAZINE HYDROCHLORIDE 20 MG/ML
10 INJECTION INTRAMUSCULAR; INTRAVENOUS ONCE
Status: DISCONTINUED | OUTPATIENT
Start: 2019-02-11 | End: 2019-02-12

## 2019-02-12 VITALS
WEIGHT: 140 LBS | TEMPERATURE: 98 F | BODY MASS INDEX: 23.32 KG/M2 | SYSTOLIC BLOOD PRESSURE: 162 MMHG | DIASTOLIC BLOOD PRESSURE: 76 MMHG | HEART RATE: 62 BPM | RESPIRATION RATE: 18 BRPM | OXYGEN SATURATION: 97 % | HEIGHT: 65 IN

## 2019-02-12 NOTE — TELEPHONE ENCOUNTER
"Reviewed guideline with caller, advises caller be seen and evaluated in ED for high blood pressure with headache. Caller agrees to follow care advice.     Reason for Disposition  • [1] Systolic BP  >= 160 OR Diastolic >= 100 AND [2] cardiac or neurologic symptoms (e.g., chest pain, difficulty breathing, unsteady gait, blurred vision)    Additional Information  • Negative: Difficult to awaken or acting confused (e.g., disoriented, slurred speech)  • Negative: Severe difficulty breathing (e.g., struggling for each breath, speaks in single words)  • Negative: [1] Weakness of the face, arm or leg on one side of the body AND [2] new onset  • Negative: [1] Numbness (i.e., loss of sensation) of the face, arm or leg on one side of the body AND [2] new onset  • Negative: [1] Chest pain lasts > 5 minutes AND [2] history of heart disease  (i.e., heart attack, bypass surgery, angina, angioplasty, CHF)  • Negative: [1] Chest pain AND [2] took nitrogylcerin AND [3] pain was not relieved  • Negative: Sounds like a life-threatening emergency to the triager  • Negative: Symptom is main concern  (e.g., headache, chest pain)  • Negative: Low blood pressure is main concern    Answer Assessment - Initial Assessment Questions  1. BLOOD PRESSURE: \"What is the blood pressure?\" \"Did you take at least two measurements 5 minutes apart?\"      183/81, 185/80  2. ONSET: \"When did you take your blood pressure?\"      20 minutes ago  3. HOW: \"How did you obtain the blood pressure?\" (e.g., visiting nurse, automatic home BP monitor)      Automatic home BP monitor, arm cuff  4. HISTORY: \"Do you have a history of high blood pressure?\"      yes  5. MEDICATIONS: \"Are you taking any medications for blood pressure?\" \"Have you missed any doses recently?\"      Yes, Over the weekend missed one of her medications on Sat. And Sun.  6. OTHER SYMPTOMS: \"Do you have any symptoms?\" (e.g., headache, chest pain, blurred vision, difficulty breathing, weakness)      " "Headache today,   7. PREGNANCY: \"Is there any chance you are pregnant?\" \"When was your last menstrual period?\"      no    Protocols used: HIGH BLOOD PRESSURE-ADULT-AH      "

## 2019-02-12 NOTE — ED PROVIDER NOTES
Subjective   78 y/o female presents to the ED for HTN and HA. Patient states that she has had a HA all day and a family member took her BP at home, stated it was 190s/100s. Patient stated that she has been without one of her BP meds all weekend and just got it filled today. Patient stated that she took the medication at 3PM this evening. Patient denies any chest pain, SOB, weakness, numbness, or speech trouble.          History provided by:  Patient   used: No        Review of Systems   Constitutional: Negative.    Eyes: Negative.    Respiratory: Negative.    Cardiovascular: Negative.    Gastrointestinal: Negative.    Endocrine: Negative.    Genitourinary: Negative.    Musculoskeletal: Negative.    Skin: Negative.    Allergic/Immunologic: Negative.    Neurological: Positive for headaches.   Hematological: Negative.    Psychiatric/Behavioral: Negative.    All other systems reviewed and are negative.      Past Medical History:   Diagnosis Date   • Anemia    • Anxiety    • Aortic insufficiency    • Arthritis    • B12 deficiency    • Bradycardia    • Bronchitis    • Depression 2006   • GERD (gastroesophageal reflux disease)    • Hypertension    • Insomnia    • Lupus    • MRSA (methicillin resistant staph aureus) culture positive    • Osteoporosis    • Renal failure    • Stroke (CMS/HCC)     TIA       No Known Allergies    Past Surgical History:   Procedure Laterality Date   • ANTERIOR AND POSTERIOR VAGINAL REPAIR  04/2008    Along with vaginal vault suspension and PULLP   • BILATERAL SALPINGO OOPHORECTOMY Bilateral 1984   • BREAST BIOPSY Right 1989    benign   • CARDIAC CATHETERIZATION  2008   • DEEP NECK LYMPH NODE BIOPSY / EXCISION  2002   • KNEE ARTHROPLASTY, PARTIAL REPLACEMENT Bilateral    • TOTAL ABDOMINAL HYSTERECTOMY FORDE PROCEDURE  1979       Family History   Problem Relation Age of Onset   • Arthritis Mother    • Heart attack Mother    • Diabetes Father    • Heart attack Father    • Heart  failure Sister    • Heart disease Brother    • Heart failure Brother    • Heart failure Sister    • Breast cancer Neg Hx    • Ovarian cancer Neg Hx        Social History     Socioeconomic History   • Marital status:      Spouse name: Not on file   • Number of children: Not on file   • Years of education: Not on file   • Highest education level: Not on file   Tobacco Use   • Smoking status: Never Smoker   • Smokeless tobacco: Never Used   Substance and Sexual Activity   • Alcohol use: No   • Drug use: No   • Sexual activity: Defer           Objective   Physical Exam   Constitutional: She is oriented to person, place, and time. She appears well-developed and well-nourished. No distress.   HENT:   Head: Normocephalic and atraumatic.   Right Ear: External ear normal.   Left Ear: External ear normal.   Nose: Nose normal.   Mouth/Throat: Oropharynx is clear and moist.   Eyes: Conjunctivae and EOM are normal. Pupils are equal, round, and reactive to light.   Neck: Normal range of motion. Neck supple.   Cardiovascular: Normal rate, regular rhythm, normal heart sounds and intact distal pulses.   Pulmonary/Chest: Effort normal and breath sounds normal.   Abdominal: Soft. Bowel sounds are normal.   Musculoskeletal: Normal range of motion.   Neurological: She is alert and oriented to person, place, and time.   NIH 0   Skin: Skin is warm and dry. She is not diaphoretic.   Nursing note and vitals reviewed.      Procedures           ED Course  ED Course as of Feb 12 0008   Mon Feb 11, 2019   [id number]- Reviewed by Dr. Salazar, rate 65, NSR, no ischemia  ECG 12 Lead [ML]   Tue Feb 12, 2019   0001 Neg per Dr. Salazar  XR Chest 2 View [ML]   0004 Patient BP improved in the ED.  174/74.  Patient remains asymptomatic.  States that she no longer has a HA and has not had one while in the ED.  Patient states that she would like to go home.  I instructed patient to take her medications as prescribed. Discussed sx with the patient  that would warrant return tot he ED. She voices understanding.    [ML]      ED Course User Index  [ML] Aleah Neal PA                  [facility]      Final diagnoses:   Hypertension, unspecified type            Aleah Neal PA  02/12/19 0008

## 2019-02-19 ENCOUNTER — TELEPHONE (OUTPATIENT)
Dept: CARDIOLOGY | Facility: CLINIC | Age: 79
End: 2019-02-19

## 2019-02-19 NOTE — TELEPHONE ENCOUNTER
PT running 180-200/- PT stopped Norvasc and started Lisinopril 20 mg daily at last visit- HR 60-70's and is taking bisoprolol 10 mg daily. She went to PCP and they told her systolic was 200- they added back Norvasc 5 mg daily but PT was reluctant to start since it caused edema before-     Will have her to increase lisinopril to 40 mg daily and she will call me in 1 week or sooner with an update-

## 2019-02-25 NOTE — TELEPHONE ENCOUNTER
BP a little better, since increasing Lisinopril to 40 mg- Still increasing to 160-170/60-70's in the evenings- HR 60's- currently lisinopril 40 mg daily and Bisoprolol 10 mg daily- swelling resolved after stopping Norvasc-

## 2019-02-26 RX ORDER — TERAZOSIN 2 MG/1
2 CAPSULE ORAL NIGHTLY
Qty: 90 CAPSULE | Refills: 1 | Status: SHIPPED | OUTPATIENT
Start: 2019-02-26 | End: 2019-03-14 | Stop reason: DRUGHIGH

## 2019-02-26 NOTE — TELEPHONE ENCOUNTER
Reviewed by [facility] and she recommends stopping lisinopril and adding Hytrin 2 mg Q HS- relayed to PT and she verbalized understanding- she will call me in 1-2 weeks with an update or sooner if needed

## 2019-03-07 ENCOUNTER — TELEPHONE (OUTPATIENT)
Dept: CARDIOLOGY | Facility: CLINIC | Age: 79
End: 2019-03-07

## 2019-03-07 NOTE — TELEPHONE ENCOUNTER
PT calls to update since starting Hytrin 2 mg Q HS- BP still running 140-150's/70's- will have her to increase to 4 mg daily and she will call me in 1 week or sooner with an update

## 2019-03-14 ENCOUNTER — TELEPHONE (OUTPATIENT)
Dept: CARDIOLOGY | Facility: CLINIC | Age: 79
End: 2019-03-14

## 2019-03-14 RX ORDER — TERAZOSIN 5 MG/1
5 CAPSULE ORAL EVERY 12 HOURS
Qty: 60 CAPSULE | Refills: 2 | Status: SHIPPED | OUTPATIENT
Start: 2019-03-14 | End: 2019-04-30 | Stop reason: DRUGHIGH

## 2019-03-14 NOTE — TELEPHONE ENCOUNTER
bp still running 140-150's/80's HR 70's- taking hytrin 4 mg - will increase to 5 mg BID- she will start out at 5 mg daily for 3-4 days and then increase to BID on day 5- she will update me in 1 week or sooner if needed

## 2019-03-28 RX ORDER — HYDROCHLOROTHIAZIDE 25 MG/1
TABLET ORAL
Qty: 30 TABLET | Refills: 3 | Status: SHIPPED | OUTPATIENT
Start: 2019-03-28 | End: 2019-08-20

## 2019-03-28 NOTE — TELEPHONE ENCOUNTER
PT took her BP cuff to PCP office and they checked BP with her cuff and their cuff- they got 126/66 with theirs and she states significantly higher with hers- she plans to go get a new cuff-   She is asking for a PRN HCTZ- [facility] states that HCTZ 25 mg as needed is fine-

## 2019-04-18 ENCOUNTER — TELEPHONE (OUTPATIENT)
Dept: CARDIOLOGY | Facility: CLINIC | Age: 79
End: 2019-04-18

## 2019-04-18 NOTE — TELEPHONE ENCOUNTER
PT called stating that her BP is elevated again- 160/69, 178/75, 180/70, 146/64-     Current cardiac meds-   Hytrin 5 mg Q 12 hours,  bisoprolol 10 mg daily and HCTZ 25 mg PRN-    She did not answer either number but I did leave a msg for her to call me back- will await her return call

## 2019-04-18 NOTE — TELEPHONE ENCOUNTER
She will take HCTZ 25 mg daily and she will call me Monday or Tuesday with an update- if this helps, we will order a BMP- she verbalized understanding-

## 2019-04-24 ENCOUNTER — TELEPHONE (OUTPATIENT)
Dept: CARDIOLOGY | Facility: CLINIC | Age: 79
End: 2019-04-24

## 2019-04-24 NOTE — TELEPHONE ENCOUNTER
Patient called and left VM stating her BP [facility] 146[facility]76 [facility].  At home this morning was 161/70,last night reading was 158/73.  Multiple readings left on VM: 161/72, 158/67, 161/71, 148/68, 180/74, 178/77.  Attempted to call back for more info, no answer and no VM available.

## 2019-04-26 ENCOUNTER — TELEPHONE (OUTPATIENT)
Dept: CARDIOLOGY | Facility: CLINIC | Age: 79
End: 2019-04-26

## 2019-04-26 NOTE — TELEPHONE ENCOUNTER
Current meds Hytrin 5 mg Q 12 hours, Bisoprolol 10 mg Daily and HCTZ 25 mg Daily- BP still elevated 140-160/70-80's-     Creatinine at [facility] was 1.3 yesterday so they instructed her to stop HCTZ and only take as needed-     Need something else for her BP though- no known intolerances or allergies

## 2019-04-30 RX ORDER — TERAZOSIN 10 MG/1
10 CAPSULE ORAL NIGHTLY
Qty: 180 CAPSULE | Refills: 3 | Status: SHIPPED | OUTPATIENT
Start: 2019-04-30 | End: 2019-05-28 | Stop reason: SDUPTHER

## 2019-04-30 NOTE — TELEPHONE ENCOUNTER
Per [facility]- increase Hytrin on to 10 mg BID-     Relayed to PT and she verbalized understanding- new script sent to pharmacy- she will call me in 7-10 days with an update

## 2019-05-06 ENCOUNTER — TELEPHONE (OUTPATIENT)
Dept: CARDIOLOGY | Facility: CLINIC | Age: 79
End: 2019-05-06

## 2019-05-06 NOTE — TELEPHONE ENCOUNTER
Current cardiac meds:  Hytrin 10 mg BID  Bisoprolol 10 mg daily    BP continues to run 149-163/60-75 HR 70's-   Rheumatology stopped HCTZ and Celebrex due increase creatinine-(1.3 on 4/25/19)

## 2019-05-07 NOTE — TELEPHONE ENCOUNTER
Spoke with patient advised we are increasing Bisoprolol to 10 mg q 12 per [facility]. Pt advised to continue monitoring BP and HR. Pt agreeable to plan. No further questions at this time.

## 2019-05-23 ENCOUNTER — TRANSCRIBE ORDERS (OUTPATIENT)
Dept: ADMINISTRATIVE | Facility: HOSPITAL | Age: 79
End: 2019-05-23

## 2019-05-23 DIAGNOSIS — R26.81 UNSTEADY GAIT: Primary | ICD-10-CM

## 2019-05-23 RX ORDER — BISOPROLOL FUMARATE 10 MG/1
TABLET, FILM COATED ORAL
Qty: 90 TABLET | Refills: 3 | Status: SHIPPED | OUTPATIENT
Start: 2019-05-23 | End: 2019-05-23 | Stop reason: SDUPTHER

## 2019-05-23 RX ORDER — BISOPROLOL FUMARATE 10 MG/1
10 TABLET, FILM COATED ORAL EVERY 12 HOURS
Qty: 180 TABLET | Refills: 3 | Status: SHIPPED | OUTPATIENT
Start: 2019-05-23 | End: 2019-09-25

## 2019-05-28 ENCOUNTER — TRANSCRIBE ORDERS (OUTPATIENT)
Dept: ADMINISTRATIVE | Facility: HOSPITAL | Age: 79
End: 2019-05-28

## 2019-05-28 DIAGNOSIS — R51.9 SUDDEN ONSET OF SEVERE HEADACHE: Primary | ICD-10-CM

## 2019-05-28 RX ORDER — TERAZOSIN 10 MG/1
10 CAPSULE ORAL EVERY 12 HOURS
Qty: 180 CAPSULE | Refills: 3 | Status: SHIPPED | OUTPATIENT
Start: 2019-05-28 | End: 2019-10-30

## 2019-05-29 ENCOUNTER — APPOINTMENT (OUTPATIENT)
Dept: CARDIOLOGY | Facility: HOSPITAL | Age: 79
End: 2019-05-29

## 2019-05-31 ENCOUNTER — HOSPITAL ENCOUNTER (OUTPATIENT)
Dept: MRI IMAGING | Facility: HOSPITAL | Age: 79
Discharge: HOME | End: 2019-05-31

## 2019-05-31 ENCOUNTER — HOSPITAL ENCOUNTER (OUTPATIENT)
Dept: CARDIOLOGY | Facility: HOSPITAL | Age: 79
Discharge: HOME | End: 2019-05-31
Admitting: NURSE PRACTITIONER

## 2019-05-31 DIAGNOSIS — R51.9 SUDDEN ONSET OF SEVERE HEADACHE: ICD-10-CM

## 2019-05-31 DIAGNOSIS — R26.81 UNSTEADY GAIT: ICD-10-CM

## 2019-05-31 PROCEDURE — 93880 EXTRACRANIAL BILAT STUDY: CPT | Performed by: RADIOLOGY

## 2019-05-31 PROCEDURE — 70551 MRI BRAIN STEM W/O DYE: CPT

## 2019-05-31 PROCEDURE — 70551 MRI BRAIN STEM W/O DYE: CPT | Performed by: RADIOLOGY

## 2019-05-31 PROCEDURE — 93880 EXTRACRANIAL BILAT STUDY: CPT

## 2019-07-29 ENCOUNTER — TRANSCRIBE ORDERS (OUTPATIENT)
Dept: ADMINISTRATIVE | Facility: HOSPITAL | Age: 79
End: 2019-07-29

## 2019-07-29 DIAGNOSIS — R06.02 SHORTNESS OF BREATH: Primary | ICD-10-CM

## 2019-07-30 ENCOUNTER — HOSPITAL ENCOUNTER (OUTPATIENT)
Dept: CT IMAGING | Facility: HOSPITAL | Age: 79
Discharge: HOME | End: 2019-07-30
Admitting: NURSE PRACTITIONER

## 2019-07-30 DIAGNOSIS — R06.02 SHORTNESS OF BREATH: ICD-10-CM

## 2019-07-30 PROCEDURE — 71275 CT ANGIOGRAPHY CHEST: CPT | Performed by: RADIOLOGY

## 2019-07-30 PROCEDURE — 71275 CT ANGIOGRAPHY CHEST: CPT

## 2019-07-30 PROCEDURE — 0 IOVERSOL 68 % SOLUTION: Performed by: NURSE PRACTITIONER

## 2019-07-30 RX ADMIN — IOVERSOL 60 ML: 678 INJECTION INTRA-ARTERIAL; INTRAVENOUS at 08:51

## 2019-08-20 ENCOUNTER — OFFICE VISIT (OUTPATIENT)
Dept: CARDIOLOGY | Facility: CLINIC | Age: 79
End: 2019-08-20

## 2019-08-20 VITALS
HEART RATE: 60 BPM | BODY MASS INDEX: 22.88 KG/M2 | WEIGHT: 142.4 LBS | HEIGHT: 66 IN | SYSTOLIC BLOOD PRESSURE: 126 MMHG | DIASTOLIC BLOOD PRESSURE: 64 MMHG

## 2019-08-20 DIAGNOSIS — G45.9 TIA (TRANSIENT ISCHEMIC ATTACK): ICD-10-CM

## 2019-08-20 DIAGNOSIS — I35.1 AORTIC VALVE INSUFFICIENCY, ETIOLOGY OF CARDIAC VALVE DISEASE UNSPECIFIED: ICD-10-CM

## 2019-08-20 DIAGNOSIS — R00.2 PALPITATIONS: ICD-10-CM

## 2019-08-20 DIAGNOSIS — I10 BENIGN ESSENTIAL HYPERTENSION: ICD-10-CM

## 2019-08-20 DIAGNOSIS — I50.33 ACUTE ON CHRONIC DIASTOLIC CHF (CONGESTIVE HEART FAILURE) (HCC): Primary | ICD-10-CM

## 2019-08-20 DIAGNOSIS — I34.0 MILD MITRAL REGURGITATION: ICD-10-CM

## 2019-08-20 PROBLEM — I36.1 NON-RHEUMATIC TRICUSPID VALVE INSUFFICIENCY: Status: ACTIVE | Noted: 2019-08-20

## 2019-08-20 PROCEDURE — 99214 OFFICE O/P EST MOD 30 MIN: CPT | Performed by: INTERNAL MEDICINE

## 2019-08-20 RX ORDER — TRIAMTERENE AND HYDROCHLOROTHIAZIDE 37.5; 25 MG/1; MG/1
0.5 TABLET ORAL DAILY
Qty: 45 TABLET | Refills: 1 | Status: SHIPPED | OUTPATIENT
Start: 2019-08-20 | End: 2019-10-30

## 2019-08-20 RX ORDER — VALSARTAN 160 MG/1
80 TABLET ORAL 2 TIMES DAILY
Refills: 4 | COMMUNITY
Start: 2019-07-09 | End: 2019-09-25

## 2019-08-20 NOTE — PROGRESS NOTES
Kelsey Avendano  1940  79 y.o.  [phone]  [phone]      Date: 08/20/2019    PCP: Kathleen Quiroga APRN    Chief Complaint   Patient presents with   • Shortness of Breath   • Palpitations   • Hypertension       Problem List:  1. TIA; on aspirin (not daily); right sided visual deficit:  a. MRI, 02/28/2018: Age-appropriate diffuse generalized cerebral atrophy with gliosis in the white matter and ill defined oat infarct in the cerebellum on the left with no acute areas of ischemia  b. MRA, 02/28/2018: Neck, slightly tortuous vessels consistent with atherosclerotic changes and no focal areas of stenosis. Antegrade flow noted bilaterally.  c. Echocardiogram, 02/28/2018: EF 60-65%, grade 2 diastolic dysfunction with mildly dilated right atrial cavity is moderate pulmonary hypertension.  d. Holter 3/2018: Normal sinus rhythm/sinus bradycardia at 58 bpm.  e. JEANETTE, 11/06/2018: EF 60%. Moderate AI, aortic valve leaflets appear rheumatic. Mild-to-moderate MR and TR. Most likely source of patient's TIA is aortic atheroma.  2. Chronic Diastolic Heart Failure  a. Cardiac catheterization in 2008: Reportedly normal, data insufficient  3. Hypertension.  4. Hyperlipidemia.  5. Hx CVA.  6. GERD.  7. Lupus.  8. Surgeries:  a. Partial replacement bilateral knees.      No Known Allergies    Current Medications:      Current Outpatient Medications:   •  aspirin 81 MG EC tablet, Take 81 mg by mouth Daily., Disp: , Rfl:   •  bisoprolol (ZEBeta) 10 MG tablet, Take 1 tablet by mouth Every 12 (Twelve) Hours. for blood pressure, Disp: 180 tablet, Rfl: 3  •  celecoxib (CeleBREX) 200 MG capsule, Take 200 mg by mouth Daily., Disp: , Rfl:   •  cetirizine (zyrTEC) 10 MG tablet, Take 10 mg by mouth Daily., Disp: , Rfl:   •  escitalopram (LEXAPRO) 20 MG tablet, Take 20 mg by mouth Daily., Disp: , Rfl:   •  hydroxychloroquine (PLAQUENIL) 200 MG tablet, Take 200 mg by mouth Daily., Disp: , Rfl:   •  pantoprazole (PROTONIX) 40 MG EC  "tablet, Take 40 mg by mouth Daily., Disp: , Rfl:   •  terazosin (HYTRIN) 10 MG capsule, Take 1 capsule by mouth Every 12 (Twelve) Hours., Disp: 180 capsule, Rfl: 3  •  valsartan (DIOVAN) 160 MG tablet, 80 mg 2 (Two) Times a Day., Disp: , Rfl: 4  •  triamterene-hydrochlorothiazide (MAXZIDE-25) 37.5-25 MG per tablet, Take 0.5 tablets by mouth Daily., Disp: 45 tablet, Rfl: 1    HPI    Kelsey Avendano is a 79 y.o. female who presents today for an 8 month follow up of TIA, palpitations, and hypertension. Since last visit, she has has had 2 episodes of what was referred to as \"heart failure\" and is been given Lasix and potassium twice now.. She recently was put on Lasix 20 mg with Potassium supplements for her shortness of breath, which alleviated her symptoms and drastically reduced her blood pressure. She also reports that she has had chest pain since her last visit that seemed to occur after she had a part of a role that seem to large in her chest.  She took some of her sister's baclofen and her chest discomfort has improved.. She notes that she had a CT of her chest, which was unremarkable.     She notes that 3 weeks ago, she begun to become shortness of breath with onset of her bronchitis in July 2019. She had originally had broncitis in June 2019 as well.  Patient denies chest pain, palpitations, shortness of breath, edema, dizziness, and syncope.     The following portions of the patient's history were reviewed and updated as appropriate: allergies, current medications and problem list.    Pertinent positives as listed in the HPI.  All other systems reviewed are negative.    Vitals:    08/20/19 1558   BP: 126/64   BP Location: Left arm   Patient Position: Sitting   Pulse: 60   Weight: 64.6 kg (142 lb 6.4 oz)   Height: 166.4 cm (65.5\")       Physical Exam:    General: Alert and oriented.  Neck: Jugular venous pressure is within normal limits. Carotids have normal upstrokes without bruits.   Cardiovascular: Heart " has a nondisplaced focal PMI. Regular rate and rhythm without murmur, gallop or rub.  Lungs: Clear without rales or wheezes. Equal expansion is noted.   Extremities: Show no edema.  Skin: Warm and dry.  Neurologic: Nonfocal.    Diagnostic Data:  Lab Results   Component Value Date    GLUCOSE 104 02/11/2019    BUN 26 (H) 02/11/2019    CREATININE 1.00 02/11/2019    EGFRIFNONA 54 (L) 02/11/2019    BCR 26.0 (H) 02/11/2019     02/11/2019    K 3.8 02/11/2019     02/11/2019    CO2 28.7 02/11/2019    CALCIUM 9.1 02/11/2019    ALBUMIN 4.00 02/11/2019    AST 34 (H) 02/11/2019    ALT 25 02/11/2019     No results found for: CHOL, TRIG, HDL, LDL, LDLDIRECT   Lab Results   Component Value Date    WBC 4.76 02/11/2019    HGB 12.1 02/11/2019    HCT 37.5 02/11/2019    MCV 92.6 02/11/2019     02/11/2019     No results found for: TSH    Procedures    Assessment:      ICD-10-CM ICD-9-CM   1. Acute on chronic diastolic CHF (congestive heart failure) (CMS/[facility]) I50.33 428.33     428.0   2. TIA (transient ischemic attack) G45.9 435.9   3. Palpitations R00.2 785.1   4. Benign essential hypertension I10 401.1   5. Aortic valve insufficiency, etiology of cardiac valve disease unspecified I35.1 424.1   6. Mild mitral regurgitation I34.0 424.0     Lab results found above were reviewed with the patient.    Plan:      1. Patient will reduce the frequency of Bisoprolol 10 mg , Diovan 160 mg, and Hytrin 10 mg from BID to once a day to begin new medication for relative hypotension since that has occurred since she began taking intermittent Lasix and potassium  2. Patient will be prescribed Maxzide 37.5/25 mg one half daily for diastolic heart failure  3. Continue all other current medications.  4. Patient will have her echo scheduled for Monday, 9/26/2019, for mild MR and moderate AI  5. F/up in 1 month or sooner if needed    Scribed for Kailee Birmingham MD by Crystal Cee. 8/20/2019  4:20 PM     Kailee Birmingham MD,  FACC

## 2019-09-03 LAB — CREAT BLDA-MCNC: 1 MG/DL

## 2019-09-13 ENCOUNTER — TELEPHONE (OUTPATIENT)
Dept: CARDIOLOGY | Facility: CLINIC | Age: 79
End: 2019-09-13

## 2019-09-13 NOTE — TELEPHONE ENCOUNTER
PT calls to report that she has episodes of lo BP the past few days- she has been taking Maxzide 37.5/25 mg 1/2 tab daily and states that she notices when she takes Maxzide then her BP is much lower than it usually is- she doesn't take some of her BP meds when she takes Maxzide- I have asked that only take Maxzide as needed and then to check her BP/HR daily and call me next week with BP readings-

## 2019-09-17 ENCOUNTER — HOSPITAL ENCOUNTER (OUTPATIENT)
Dept: CARDIOLOGY | Facility: HOSPITAL | Age: 79
Discharge: HOME | End: 2019-09-17
Admitting: INTERNAL MEDICINE

## 2019-09-17 VITALS
BODY MASS INDEX: 23.66 KG/M2 | SYSTOLIC BLOOD PRESSURE: 173 MMHG | HEIGHT: 65 IN | WEIGHT: 142 LBS | DIASTOLIC BLOOD PRESSURE: 74 MMHG

## 2019-09-17 DIAGNOSIS — I35.1 AORTIC VALVE INSUFFICIENCY, ETIOLOGY OF CARDIAC VALVE DISEASE UNSPECIFIED: ICD-10-CM

## 2019-09-17 PROCEDURE — 93306 TTE W/DOPPLER COMPLETE: CPT | Performed by: INTERNAL MEDICINE

## 2019-09-17 PROCEDURE — 93306 TTE W/DOPPLER COMPLETE: CPT

## 2019-09-18 LAB
BH CV ECHO MEAS - AI DEC SLOPE: 292.6 CM/SEC^2
BH CV ECHO MEAS - AI MAX PG: 69.7 MMHG
BH CV ECHO MEAS - AI MAX VEL: 417.5 CM/SEC
BH CV ECHO MEAS - AI P1/2T: 417.8 MSEC
BH CV ECHO MEAS - AO MAX PG (FULL): 4.4 MMHG
BH CV ECHO MEAS - AO MAX PG: 10 MMHG
BH CV ECHO MEAS - AO MEAN PG (FULL): 2.2 MMHG
BH CV ECHO MEAS - AO MEAN PG: 5.2 MMHG
BH CV ECHO MEAS - AO ROOT AREA (BSA CORRECTED): 1.8
BH CV ECHO MEAS - AO ROOT AREA: 7.5 CM^2
BH CV ECHO MEAS - AO ROOT DIAM: 3.1 CM
BH CV ECHO MEAS - AO V2 MAX: 158.1 CM/SEC
BH CV ECHO MEAS - AO V2 MEAN: 107.2 CM/SEC
BH CV ECHO MEAS - AO V2 VTI: 32.1 CM
BH CV ECHO MEAS - ASC AORTA: 3.1 CM
BH CV ECHO MEAS - AVA(I,A): 2.5 CM^2
BH CV ECHO MEAS - AVA(I,D): 2.5 CM^2
BH CV ECHO MEAS - AVA(V,A): 2.4 CM^2
BH CV ECHO MEAS - AVA(V,D): 2.4 CM^2
BH CV ECHO MEAS - BSA(HAYCOCK): 1.7 M^2
BH CV ECHO MEAS - BSA: 1.7 M^2
BH CV ECHO MEAS - BZI_BMI: 23.6 KILOGRAMS/M^2
BH CV ECHO MEAS - BZI_METRIC_HEIGHT: 165.1 CM
BH CV ECHO MEAS - BZI_METRIC_WEIGHT: 64.4 KG
BH CV ECHO MEAS - CI(CUBED): 3.5 L/MIN/M^2
BH CV ECHO MEAS - CI(MOD-SP2): 0.98 L/MIN/M^2
BH CV ECHO MEAS - CI(MOD-SP4): 1.9 L/MIN/M^2
BH CV ECHO MEAS - CI(TEICH): 3.2 L/MIN/M^2
BH CV ECHO MEAS - CO(CUBED): 6 L/MIN
BH CV ECHO MEAS - CO(MOD-SP2): 1.7 L/MIN
BH CV ECHO MEAS - CO(MOD-SP4): 3.2 L/MIN
BH CV ECHO MEAS - CO(TEICH): 5.5 L/MIN
BH CV ECHO MEAS - EDV(CUBED): 97.8 ML
BH CV ECHO MEAS - EDV(MOD-SP2): 44 ML
BH CV ECHO MEAS - EDV(MOD-SP4): 60 ML
BH CV ECHO MEAS - EDV(TEICH): 97.7 ML
BH CV ECHO MEAS - EF(CUBED): 84.5 %
BH CV ECHO MEAS - EF(MOD-BP): 65 %
BH CV ECHO MEAS - EF(MOD-SP2): 52.3 %
BH CV ECHO MEAS - EF(MOD-SP4): 73.3 %
BH CV ECHO MEAS - EF(TEICH): 77.8 %
BH CV ECHO MEAS - ESV(CUBED): 15.1 ML
BH CV ECHO MEAS - ESV(MOD-SP2): 21 ML
BH CV ECHO MEAS - ESV(MOD-SP4): 16 ML
BH CV ECHO MEAS - ESV(TEICH): 21.7 ML
BH CV ECHO MEAS - FS: 46.3 %
BH CV ECHO MEAS - IVS/LVPW: 1.1
BH CV ECHO MEAS - IVSD: 1.1 CM
BH CV ECHO MEAS - LA DIMENSION: 3.8 CM
BH CV ECHO MEAS - LA/AO: 1.2
BH CV ECHO MEAS - LAD MAJOR: 5.7 CM
BH CV ECHO MEAS - LAT PEAK E' VEL: 4.3 CM/SEC
BH CV ECHO MEAS - LATERAL E/E' RATIO: 11
BH CV ECHO MEAS - LV DIASTOLIC VOL/BSA (35-75): 35.1 ML/M^2
BH CV ECHO MEAS - LV MASS(C)D: 181.2 GRAMS
BH CV ECHO MEAS - LV MASS(C)DI: 106 GRAMS/M^2
BH CV ECHO MEAS - LV MAX PG: 5.6 MMHG
BH CV ECHO MEAS - LV MEAN PG: 3 MMHG
BH CV ECHO MEAS - LV SYSTOLIC VOL/BSA (12-30): 9.4 ML/M^2
BH CV ECHO MEAS - LV V1 MAX: 118.5 CM/SEC
BH CV ECHO MEAS - LV V1 MEAN: 81.3 CM/SEC
BH CV ECHO MEAS - LV V1 VTI: 24.9 CM
BH CV ECHO MEAS - LVIDD: 4.6 CM
BH CV ECHO MEAS - LVIDS: 2.5 CM
BH CV ECHO MEAS - LVLD AP2: 6.7 CM
BH CV ECHO MEAS - LVLD AP4: 6.6 CM
BH CV ECHO MEAS - LVLS AP2: 5.4 CM
BH CV ECHO MEAS - LVLS AP4: 5.4 CM
BH CV ECHO MEAS - LVOT AREA (M): 3.1 CM^2
BH CV ECHO MEAS - LVOT AREA: 3.2 CM^2
BH CV ECHO MEAS - LVOT DIAM: 2 CM
BH CV ECHO MEAS - LVPWD: 1.1 CM
BH CV ECHO MEAS - MED PEAK E' VEL: 3.3 CM/SEC
BH CV ECHO MEAS - MEDIAL E/E' RATIO: 14.2
BH CV ECHO MEAS - MM HR: 73 BPM
BH CV ECHO MEAS - MM R-R INT: 0.82 SEC
BH CV ECHO MEAS - MV A MAX VEL: 68.1 CM/SEC
BH CV ECHO MEAS - MV DEC SLOPE: 213.4 CM/SEC^2
BH CV ECHO MEAS - MV E MAX VEL: 48.9 CM/SEC
BH CV ECHO MEAS - MV E/A: 0.72
BH CV ECHO MEAS - MV MAX PG: 3.6 MMHG
BH CV ECHO MEAS - MV MEAN PG: 1.9 MMHG
BH CV ECHO MEAS - MV V2 MAX: 95 CM/SEC
BH CV ECHO MEAS - MV V2 MEAN: 65 CM/SEC
BH CV ECHO MEAS - MV V2 VTI: 27.7 CM
BH CV ECHO MEAS - MVA(VTI): 2.9 CM^2
BH CV ECHO MEAS - PA ACC SLOPE: 576.3 CM/SEC^2
BH CV ECHO MEAS - PA ACC TIME: 0.13 SEC
BH CV ECHO MEAS - PA MAX PG: 2.8 MMHG
BH CV ECHO MEAS - PA PR(ACCEL): 22 MMHG
BH CV ECHO MEAS - PA V2 MAX: 83.6 CM/SEC
BH CV ECHO MEAS - RAP SYSTOLE: 3 MMHG
BH CV ECHO MEAS - RVSP: 32.5 MMHG
BH CV ECHO MEAS - SI(AO): 140.4 ML/M^2
BH CV ECHO MEAS - SI(CUBED): 48.3 ML/M^2
BH CV ECHO MEAS - SI(LVOT): 46.4 ML/M^2
BH CV ECHO MEAS - SI(MOD-SP2): 13.4 ML/M^2
BH CV ECHO MEAS - SI(MOD-SP4): 25.7 ML/M^2
BH CV ECHO MEAS - SI(TEICH): 44.4 ML/M^2
BH CV ECHO MEAS - SV(AO): 240.1 ML
BH CV ECHO MEAS - SV(CUBED): 82.7 ML
BH CV ECHO MEAS - SV(LVOT): 79.4 ML
BH CV ECHO MEAS - SV(MOD-SP2): 23 ML
BH CV ECHO MEAS - SV(MOD-SP4): 44 ML
BH CV ECHO MEAS - SV(TEICH): 76 ML
BH CV ECHO MEAS - TAPSE (>1.6): 2.2 CM2
BH CV ECHO MEAS - TR MAX PG: 29.5 MMHG
BH CV ECHO MEAS - TR MAX VEL: 271.5 CM/SEC
BH CV ECHO MEASUREMENTS AVERAGE E/E' RATIO: 12.87
BH CV VAS BP LEFT ARM: NORMAL MMHG
BH CV XLRA - RV BASE: 3.6 CM
BH CV XLRA - RV LENGTH: 5.2 CM
BH CV XLRA - RV MID: 2.5 CM
BH CV XLRA - TDI S': 13.2 CM/SEC
LEFT ATRIUM VOLUME INDEX: 55 ML/M^2
LEFT ATRIUM VOLUME: 94 ML
LV EF 2D ECHO EST: 60 %

## 2019-09-25 ENCOUNTER — TRANSCRIBE ORDERS (OUTPATIENT)
Dept: LAB | Facility: HOSPITAL | Age: 79
End: 2019-09-25

## 2019-09-25 ENCOUNTER — LAB REQUISITION (OUTPATIENT)
Dept: LAB | Facility: HOSPITAL | Age: 79
End: 2019-09-25

## 2019-09-25 ENCOUNTER — OFFICE VISIT (OUTPATIENT)
Dept: CARDIOLOGY | Facility: CLINIC | Age: 79
End: 2019-09-25

## 2019-09-25 VITALS
HEIGHT: 65 IN | BODY MASS INDEX: 23.49 KG/M2 | HEART RATE: 65 BPM | WEIGHT: 141 LBS | SYSTOLIC BLOOD PRESSURE: 168 MMHG | DIASTOLIC BLOOD PRESSURE: 64 MMHG

## 2019-09-25 DIAGNOSIS — I10 ESSENTIAL HYPERTENSION: ICD-10-CM

## 2019-09-25 DIAGNOSIS — K76.6 PORTAL HYPERTENSION (HCC): ICD-10-CM

## 2019-09-25 DIAGNOSIS — I35.1 MODERATE AORTIC INSUFFICIENCY: Primary | ICD-10-CM

## 2019-09-25 DIAGNOSIS — Z86.73 HISTORY OF TIA (TRANSIENT ISCHEMIC ATTACK): ICD-10-CM

## 2019-09-25 DIAGNOSIS — Z00.00 ROUTINE GENERAL MEDICAL EXAMINATION AT A HEALTH CARE FACILITY: ICD-10-CM

## 2019-09-25 LAB
ANION GAP SERPL CALCULATED.3IONS-SCNC: 8 MMOL/L (ref 5–15)
BUN BLD-MCNC: 31 MG/DL (ref 8–23)
BUN/CREAT SERPL: 31 (ref 7–25)
CALCIUM SPEC-SCNC: 9.7 MG/DL (ref 8.6–10.5)
CHLORIDE SERPL-SCNC: 102 MMOL/L (ref 98–107)
CO2 SERPL-SCNC: 30 MMOL/L (ref 22–29)
CREAT BLD-MCNC: 1 MG/DL (ref 0.57–1)
GFR SERPL CREATININE-BSD FRML MDRD: 54 ML/MIN/1.73
GLUCOSE BLD-MCNC: 85 MG/DL (ref 65–99)
MAGNESIUM SERPL-MCNC: 1.8 MG/DL (ref 1.6–2.4)
POTASSIUM BLD-SCNC: 4.8 MMOL/L (ref 3.5–5.2)
SODIUM BLD-SCNC: 140 MMOL/L (ref 136–145)

## 2019-09-25 PROCEDURE — 36415 COLL VENOUS BLD VENIPUNCTURE: CPT | Performed by: INTERNAL MEDICINE

## 2019-09-25 PROCEDURE — 99214 OFFICE O/P EST MOD 30 MIN: CPT | Performed by: INTERNAL MEDICINE

## 2019-09-25 PROCEDURE — 83735 ASSAY OF MAGNESIUM: CPT | Performed by: INTERNAL MEDICINE

## 2019-09-25 PROCEDURE — 80048 BASIC METABOLIC PNL TOTAL CA: CPT | Performed by: INTERNAL MEDICINE

## 2019-09-25 RX ORDER — BACLOFEN 10 MG/1
10 TABLET ORAL AS NEEDED
Refills: 0 | Status: ON HOLD | COMMUNITY
Start: 2019-09-06 | End: 2022-05-09

## 2019-09-25 RX ORDER — BISOPROLOL FUMARATE 10 MG/1
10 TABLET, FILM COATED ORAL DAILY
COMMUNITY
End: 2019-09-25 | Stop reason: SDUPTHER

## 2019-09-25 RX ORDER — BISOPROLOL FUMARATE 10 MG/1
TABLET, FILM COATED ORAL
Qty: 135 TABLET | Refills: 3 | Status: SHIPPED | OUTPATIENT
Start: 2019-09-25 | End: 2019-10-30

## 2019-09-25 NOTE — PROGRESS NOTES
[facility]  Kelsey Avendano  1940  79 y.o.  [phone]  [phone]      Date: 09/25/2019    PCP: Kathleen Quiroga APRN    Chief Complaint   Patient presents with   • Dizziness   • Aortic Stenosis       Problem List:  1. TIA; on aspirin (not daily); right sided visual deficit:  a. MRI, 02/28/2018: Age-appropriate diffuse generalized cerebral atrophy with gliosis in the white matter and ill defined oat infarct in the cerebellum on the left with no acute areas of ischemia  b. MRA, 02/28/2018: Neck, slightly tortuous vessels consistent with atherosclerotic changes and no focal areas of stenosis. Antegrade flow noted bilaterally.  c. Echocardiogram, 02/28/2018: EF 60-65%, grade 2 diastolic dysfunction with mildly dilated right atrial cavity is moderate pulmonary hypertension.  d. Holter 3/2018: Normal sinus rhythm/sinus bradycardia at 58 bpm.  e. JEANETTE, 11/06/2018: EF 60%. Moderate AI, aortic valve leaflets appear rheumatic. Mild-to-moderate MR/TR. Most likely source of patient's TIA is aortic atheroma.  2. Aortic insufficiency:  a. JEANETTE, 11/06/2018: EF 60%. Moderate AI, aortic valve leaflets appear rheumatic. Mild-to-moderate MR/TR.   b. Echocardiogram, 09/17/2019: EF 60%. Mild-to-mod AI. Trace-to-mild MR. Mild TR.  3. Chronic Diastolic Heart Failure  a. Cardiac catheterization in 2008: Reportedly normal, data insufficient  4. Hypertension.  5. Hyperlipidemia.  6. Hx CVA.  7. GERD.  8. Lupus.  9. Surgeries:  a. Partial replacement bilateral knees.     No Known Allergies    Current Medications:      Current Outpatient Medications:   •  aspirin 81 MG EC tablet, Take 81 mg by mouth Daily., Disp: , Rfl:   •  baclofen (LIORESAL) 10 MG tablet, Take 10 mg by mouth As Needed., Disp: , Rfl: 0  •  bisoprolol (ZEBeta) 10 MG tablet, Take 10 mg by mouth Daily., Disp: , Rfl:   •  celecoxib (CeleBREX) 200 MG capsule, Take 200 mg by mouth Daily., Disp: , Rfl:   •  cetirizine (zyrTEC)  "10 MG tablet, Take 10 mg by mouth Daily., Disp: , Rfl:   •  escitalopram (LEXAPRO) 20 MG tablet, Take 20 mg by mouth Daily., Disp: , Rfl:   •  hydroxychloroquine (PLAQUENIL) 200 MG tablet, Take 200 mg by mouth Daily., Disp: , Rfl:   •  pantoprazole (PROTONIX) 40 MG EC tablet, Take 40 mg by mouth Daily., Disp: , Rfl:   •  terazosin (HYTRIN) 10 MG capsule, Take 1 capsule by mouth Every 12 (Twelve) Hours. (Patient taking differently: Take 10 mg by mouth As Needed.), Disp: 180 capsule, Rfl: 3  •  triamterene-hydrochlorothiazide (MAXZIDE-25) 37.5-25 MG per tablet, Take 0.5 tablets by mouth Daily. (Patient taking differently: Take 0.5 tablets by mouth As Needed.), Disp: 45 tablet, Rfl: 1    HPI    Kelsey Avendano is a 79 y.o. female who presents today for one month follow up of diastolic heart failure, TIA, palpitations, aortic insufficiency, and hypertension. Since last visit, she has continued to experience problems controlling her blood pressure. She also reports an episode of syncope, at which time her blood pressure was very low at 50 mmHg systolic. She is unsure of her diastolic pressure. She has been managing her own medications and taking or holding medications depending on her blood pressure readings. She has been off valsartan for a week. She reports some dizziness, both with variable blood pressure readings. She also reports hot flashes for the past month. Patient denies chest pain, palpitations, and shortness of breath.     The following portions of the patient's history were reviewed and updated as appropriate: allergies, current medications and problem list.    Pertinent positives as listed in the HPI.  All other systems reviewed are negative.    Vitals:    09/25/19 1132   BP: 168/64   BP Location: Right arm   Patient Position: Sitting   Pulse: 65   Weight: 64 kg (141 lb)   Height: 165.1 cm (65\")       Physical Exam:    General: Alert and oriented.  Neck: Jugular venous pressure is within normal limits. " Carotids have normal upstrokes without bruits.   Cardiovascular: Heart has a nondisplaced focal PMI. Regular rate and rhythm without murmur, gallop or rub.  Lungs: Clear without rales or wheezes. Equal expansion is noted.   Extremities: Show no edema.  Skin: Warm and dry.  Neurologic: Nonfocal.    Diagnostic Data:  Lab Results   Component Value Date    GLUCOSE 104 02/11/2019    BUN 26 (H) 02/11/2019    CREATININE 1.00 07/30/2019    EGFRIFNONA 54 (L) 02/11/2019    BCR 26.0 (H) 02/11/2019     02/11/2019    K 3.8 02/11/2019     02/11/2019    CO2 28.7 02/11/2019    CALCIUM 9.1 02/11/2019    ALBUMIN 4.00 02/11/2019    AST 34 (H) 02/11/2019    ALT 25 02/11/2019      Lab Results   Component Value Date    WBC 4.76 02/11/2019    HGB 12.1 02/11/2019    HCT 37.5 02/11/2019    MCV 92.6 02/11/2019     02/11/2019       Procedures    Assessment:      ICD-10-CM ICD-9-CM   1. Mild to moderate aortic insufficiency I35.1 424.1   2. Essential hypertension I10 401.9   3. History of TIA (transient ischemic attack) Z86.73 V12.54     Lab results found above were reviewed with the patient.   We suggested discontinuing Maxzide due to recent syncopal episode, but the pt want to remain on it as she feels that it is the medication that best controls her blood pressure.    Plan:    1. DC terazosin and remain off valsartan. Increase bisoprolol from 10 to 15 mg daily for better control of hypertension. Continue triamterene-hydrochlorothiazide.  2. Continue all other current medications.  3. F/up in 1 month or sooner if needed.      Scribed for Kailee Birmingham MD by Khadijah Lee. 9/25/2019  12:27 PM     I Kailee Birmingham MD personally performed the services described in this documentation as scribed by the above individual in my presence, and it is both accurate and complete.    Kailee Birmingham MD, FACC

## 2019-09-25 NOTE — PATIENT INSTRUCTIONS
1. Stop terazosin and remain off valsartan.   2. Increase bisoprolol from 10 to 15 mg daily.   3. Continue triamterene-hydrochlorothiazide.  4. Continue all other current medications.

## 2019-10-30 ENCOUNTER — OFFICE VISIT (OUTPATIENT)
Dept: CARDIOLOGY | Facility: CLINIC | Age: 79
End: 2019-10-30

## 2019-10-30 VITALS
HEIGHT: 65 IN | OXYGEN SATURATION: 97 % | DIASTOLIC BLOOD PRESSURE: 56 MMHG | BODY MASS INDEX: 24.16 KG/M2 | HEART RATE: 64 BPM | SYSTOLIC BLOOD PRESSURE: 168 MMHG | WEIGHT: 145 LBS

## 2019-10-30 DIAGNOSIS — I34.0 MILD MITRAL REGURGITATION: ICD-10-CM

## 2019-10-30 DIAGNOSIS — I50.33 ACUTE ON CHRONIC DIASTOLIC CHF (CONGESTIVE HEART FAILURE) (HCC): ICD-10-CM

## 2019-10-30 DIAGNOSIS — I10 ESSENTIAL HYPERTENSION: ICD-10-CM

## 2019-10-30 DIAGNOSIS — I36.1 NON-RHEUMATIC TRICUSPID VALVE INSUFFICIENCY: Primary | ICD-10-CM

## 2019-10-30 DIAGNOSIS — I35.1 MODERATE AORTIC INSUFFICIENCY: ICD-10-CM

## 2019-10-30 PROCEDURE — 99214 OFFICE O/P EST MOD 30 MIN: CPT | Performed by: INTERNAL MEDICINE

## 2019-10-30 RX ORDER — TRIAMTERENE AND HYDROCHLOROTHIAZIDE 37.5; 25 MG/1; MG/1
0.5 TABLET ORAL DAILY
Qty: 45 TABLET | Refills: 1 | Status: SHIPPED | OUTPATIENT
Start: 2019-10-30 | End: 2020-04-13

## 2019-10-30 RX ORDER — BISOPROLOL FUMARATE 10 MG/1
20 TABLET, FILM COATED ORAL DAILY
Qty: 135 TABLET | Refills: 3 | Status: SHIPPED | OUTPATIENT
Start: 2019-10-30 | End: 2020-04-13 | Stop reason: ALTCHOICE

## 2019-10-30 NOTE — PROGRESS NOTES
[facility]  Kelsey Avendano  1940  79 y.o.  [phone]  [phone]      Date: 10/30/2019    PCP: Kathleen Quiroga APRN    Chief Complaint   Patient presents with   • Moderate aortic insufficiency       Problem List:  1. TIA; on aspirin (not daily); right sided visual deficit:  a. MRI, 02/28/2018: Age-appropriate diffuse generalized cerebral atrophy with gliosis in the white matter and ill defined oat infarct in the cerebellum on the left with no acute areas of ischemia  b. MRA, 02/28/2018: Neck, slightly tortuous vessels consistent with atherosclerotic changes and no focal areas of stenosis. Antegrade flow noted bilaterally.  c. Echocardiogram, 02/28/2018: EF 60-65%, grade 2 diastolic dysfunction with mildly dilated right atrial cavity is moderate pulmonary hypertension.  d. Holter 3/2018: Normal sinus rhythm/sinus bradycardia at 58 bpm.  e. JEANETTE, 11/06/2018: EF 60%. Moderate AI, aortic valve leaflets appear rheumatic. Mild-to-moderate MR/TR. Most likely source of patient's TIA is aortic atheroma.  2. Aortic insufficiency:  a. JEANETTE, 11/06/2018: EF 60%. Moderate AI, aortic valve leaflets appear rheumatic. Mild-to-moderate MR/TR.   b. Echocardiogram, 09/17/2019: EF 60%. Mild-to-mod AI. Trace-to-mild MR. Mild TR.  3. Chronic diastolic heart failure  a. [facility] in 2008: Reportedly normal, data insufficient  4. Hypertension.  5. Hyperlipidemia.  6. Hx CVA.  7. GERD.  8. Lupus.  9. Surgeries:  a. Partial replacement bilateral knees.    No Known Allergies    Current Medications:      Current Outpatient Medications:   •  aspirin 81 MG EC tablet, Take 81 mg by mouth Daily., Disp: , Rfl:   •  baclofen (LIORESAL) 10 MG tablet, Take 10 mg by mouth As Needed., Disp: , Rfl: 0  •  bisoprolol (ZEBeta) 10 MG tablet, Take 2 tablets by mouth Daily. 1 1/2 TAB PO DAILY, Disp: 135 tablet, Rfl: 3  •  celecoxib (CeleBREX) 200 MG capsule, Take 200 mg by mouth Daily., Disp: , Rfl:   •   "cetirizine (zyrTEC) 10 MG tablet, Take 10 mg by mouth Daily., Disp: , Rfl:   •  escitalopram (LEXAPRO) 20 MG tablet, Take 20 mg by mouth Daily., Disp: , Rfl:   •  hydroxychloroquine (PLAQUENIL) 200 MG tablet, Take 200 mg by mouth Daily., Disp: , Rfl:   •  pantoprazole (PROTONIX) 40 MG EC tablet, Take 40 mg by mouth Daily., Disp: , Rfl:   •  triamterene-hydrochlorothiazide (MAXZIDE-25) 37.5-25 MG per tablet, Take 0.5 tablets by mouth Daily., Disp: 45 tablet, Rfl: 1    HPI    Kelsey Avendano is a 79 y.o. female who presents today for one month follow up of her history of TIA, aortic insufficiency, hypertension, and hyperlipidemia At her last visit.  Since September we have been adjusting the patient's blood pressure medicines as she had a syncopal episode and reported fatigue and feeling \"horrible\".  Since her last visit the patient reports feeling much better.  Her fatigue has resolved and she has a lot more energy.  She has had no further syncope.  However she has been watching her blood pressures at home and notices her top numbers been running higher than normal.  Blood pressure today is 168/56.  The patient denies chest pain/pressure or tightness.  She has been a little more winded than normal but thinks it is due to allergies in the changing seasons.  She denies palpitations, presyncope or syncope.      The following portions of the patient's history were reviewed and updated as appropriate: allergies, current medications and problem list.    Pertinent positives as listed in the HPI.  All other systems reviewed are negative.    Vitals:    10/30/19 1343   BP: 168/56   BP Location: Right arm   Patient Position: Sitting   Pulse: 64   SpO2: 97%   Weight: 65.8 kg (145 lb)   Height: 165.1 cm (65\")       Physical Exam:    General: Alert and oriented.  Neck: Jugular venous pressure is within normal limits. Carotids have normal .  Murmur.  No gallop or rub.  Lungs: Clear without rales or wheezes. Equal expansion is " noted.   Extremities: Show no edema.  Skin: Warm and dry.  Neurologic: Nonfocal.    Diagnostic Data:  Lab Results   Component Value Date    GLUCOSE 85 09/25/2019    BUN 31 (H) 09/25/2019    CREATININE 1.00 09/25/2019    EGFRIFNONA 54 (L) 09/25/2019    BCR 31.0 (H) 09/25/2019     09/25/2019    K 4.8 09/25/2019     09/25/2019    CO2 30.0 (H) 09/25/2019    CALCIUM 9.7 09/25/2019    ALBUMIN 4.00 02/11/2019    AST 34 (H) 02/11/2019    ALT 25 02/11/2019      Lab Results   Component Value Date    WBC 4.76 02/11/2019    HGB 12.1 02/11/2019    HCT 37.5 02/11/2019    MCV 92.6 02/11/2019     02/11/2019     Last lipid panel, 08/22/2018:    TG 60  HDL 52  LDL 49    Procedures    Assessment:      ICD-10-CM ICD-9-CM   1. Non-rheumatic tricuspid valve insufficiency I36.1 424.2   2. Moderate aortic insufficiency I35.1 424.1   3. Mild mitral regurgitation I34.0 424.0   4. Essential hypertension I10 401.9   5. Acute on chronic diastolic CHF (congestive heart failure) (CMS/[facility]) I50.33 428.33     428.0     Lab results found above were reviewed with the patient.  The patient still has some systolic hypertension.  She does feel better with more energy and no more syncopal spells.  We will increase her Bystolic to 20 mg daily.  She will monitor her blood pressures over the next few weeks and if systolic stays consistently greater than 140 she will contact us.  Could consider adding back in low-dose terazosin if needed at that time.      Plan:    1. Increase bisoprolol 20 mg daily  2. Monitor blood pressures for the next 2 weeks and if systolic consistently greater than 140 patient to contact us and we can add back in low-dose terazosin  3. Continue all other current medications.  4. F/up in 6 months or sooner if needed.      Lavinia GEORGE

## 2019-12-02 LAB
BUN SERPL-MCNC: 31 MG/DL (ref 8–23)
BUN/CREAT SERPL: 31 (ref 7–25)
CALCIUM SERPL-MCNC: 9.7 MG/DL (ref 8.6–10.5)
CHLORIDE SERPL-SCNC: 102 MMOL/L (ref 98–107)
CO2 SERPL-SCNC: 30 MMOL/L (ref 22–29)
CREAT SERPL-MCNC: 1 MG/DL (ref 0.57–1)
GLUCOSE SERPL-MCNC: 85 MG/DL (ref 65–99)
MAGNESIUM SERPL-MCNC: 1.8 MG/DL (ref 1.6–2.4)
POTASSIUM SERPL-SCNC: 4.8 MMOL/L (ref 3.5–5.2)
SODIUM SERPL-SCNC: 140 MMOL/L (ref 136–145)

## 2020-01-14 ENCOUNTER — TELEPHONE (OUTPATIENT)
Dept: CARDIOLOGY | Facility: CLINIC | Age: 80
End: 2020-01-14

## 2020-01-14 NOTE — TELEPHONE ENCOUNTER
"Patient left  to advise PCP is sending her to Nephrology due to her \"kidney function being bad, with capital letters\".  She advises that they stopped Celebrex and triameterene and started her on tramadol and tylenol. She would like to know if she needs to be seen by cardiology or if stopping the triameterene is ok.  Requested records from PCP ([phone]) for review.   "

## 2020-01-15 NOTE — TELEPHONE ENCOUNTER
Lm on vm to keep f/u appt with nephrology- nothing further from us at this time- keep us posted on any further medication changes

## 2020-01-22 ENCOUNTER — HOSPITAL ENCOUNTER (OUTPATIENT)
Dept: MRI IMAGING | Facility: HOSPITAL | Age: 80
Discharge: HOME | End: 2020-01-22
Admitting: NURSE PRACTITIONER

## 2020-01-22 ENCOUNTER — TRANSCRIBE ORDERS (OUTPATIENT)
Dept: OTHER | Facility: OTHER | Age: 80
End: 2020-01-22

## 2020-01-22 DIAGNOSIS — R51.9 FACIAL PAIN: Primary | ICD-10-CM

## 2020-01-22 DIAGNOSIS — R51.9 FACIAL PAIN: ICD-10-CM

## 2020-01-22 PROCEDURE — 70551 MRI BRAIN STEM W/O DYE: CPT | Performed by: RADIOLOGY

## 2020-01-22 PROCEDURE — 70551 MRI BRAIN STEM W/O DYE: CPT

## 2020-04-09 NOTE — PROGRESS NOTES
[facility]  TeleHealth Follow Up Visit  Kelsey Avendano  1940    VISIT DATE:  04/13/20    PCP:   Kathleen Quiroga, APRN  [address]  [place] [id number]    This patient has consented to a telehealth visit via telephone. The visit was scheduled as a telephone visit to comply with patient safety concerns in accordance with CDC recommendations.  All vitals recorded within this visit are reported by the patient.  I spent  27minutes in total including but not limited to the 22 minutes spent in direct conversation with this patient.        CC:  non rheumatic tricuspid vavle insufficieny and Shortness of Breath      Problem List:  1. TIA; on aspirin (not daily); right sided visual deficit:  a. MRI, 02/28/2018: Age-appropriate diffuse generalized cerebral atrophy with gliosis in the white matter and ill defined oat infarct in the cerebellum on the left with no acute areas of ischemia  b. MRA, 02/28/2018: Neck, slightly tortuous vessels consistent with atherosclerotic changes and no focal areas of stenosis. Antegrade flow noted bilaterally.  c. Echocardiogram, 02/28/2018: EF 60-65%, grade 2 diastolic dysfunction with mildly dilated right atrial cavity is moderate pulmonary hypertension.  d. Holter 3/2018: Normal sinus rhythm/sinus bradycardia at 58 bpm.  e. JEANETTE, 11/06/2018: EF 60%. Moderate AI, aortic valve leaflets appear rheumatic. Mild-to-moderate MR/TR. Most likely source of patient's TIA is aortic atheroma.  2. Aortic insufficiency:  a. EJANETTE, 11/06/2018: EF 60%. Moderate AI, aortic valve leaflets appear rheumatic. Mild-to-moderate MR/TR.   b. Echocardiogram, 09/17/2019: EF 60%. Mild-to-mod AI. Trace-to-mild MR. Mild TR.  3. Chronic diastolic heart failure  a. [facility] in 2008: Reportedly normal, data insufficient  4. Hypertension.  5. Hyperlipidemia.  6. Hx CVA.  7. GERD.  8. Lupus.  9. Surgeries:  a. Partial replacement bilateral knees    History of Present  Illness:  Klesey Avendano  Is a 80 y.o. female with pertinent cardiac history detailed above.  Patient returns for follow-up of history of TIA, aortic stent insufficiency, hypertension, hyperlipidemia.  She is previously followed with Dr. [place].  She last saw DORIS Crowder in our office in October.  At that time her bisoprolol was increased to 20 mg daily.  Continues to have difficulty with HTN.  Systolic range 140-160 mmhg.  [facility] and nifedipine 30 mg was added.  Her Maxide was discontinued at that time due to fluctuating renal function and she was given a prescription for as needed Lasix which she has not needed to take recent frequently.  She also reports she had a renal artery ultrasound and she was told she did not have any significant disease, age-related expected changes.  She states with nifedipine she has had problems with getting red and flushed.  She takes this medication at night and states if she has to get up to use the bathroom following this she feels lightheaded and dizzy.  She does not remember a specific reason why her terazosin was discontinued.  Patient denies any chest pain.  No recurrence of TIA symptoms.  She is not currently on a statin.      Patient Active Problem List    Diagnosis Date Noted   • Moderate aortic insufficiency 09/25/2019   • Mild mitral regurgitation 08/20/2019   • Non-rheumatic tricuspid valve insufficiency 08/20/2019   • Acute on chronic diastolic CHF (congestive heart failure) (CMS/[facility]) 08/20/2019   • Palpitations 03/22/2018   • TIA (transient ischemic attack) 02/28/2018   • Depression 01/26/2018   • Gastroesophageal reflux disease 01/26/2018   • Cystocele with rectocele 01/26/2018   • Annual GYN exam w/o problem 04/24/2017     Note Last Updated: 1/26/2018     SCREENING TESTS    Year 2012 2013 2014 2015 2016 2017 2018 2019 2020 2021 2022 2023 2024 2025 2026 2027 2028 2029 2030 2031   Age      77                 PAP            "            HPV high risk                       BILL [Birads]     2 [1] 4 [1]                 FAHAD score                       Colonoscopy                       DEXA [T-score]  Frax [hip/any]                       Lipids  [LDL / HDL / TG]                       Vitamin D                       Ovarian Screen                         Enter the month test was performed.  If month not known, enter \"X'  · Black numbers = normal results  · Red numbers = abnormal results  · Black X = patient reported normal  · Red X - patient reported abnormal      Referred by:    Profession:    Other info:           • Essential hypertension 03/05/2009   • Systemic lupus erythematosus (CMS/HCC) 08/28/2008   • Osteoporosis 07/17/2007       Allergies   Allergen Reactions   • Nifedipine Er Other (See Comments)     flushing       Social History     Socioeconomic History   • Marital status:      Spouse name: Not on file   • Number of children: Not on file   • Years of education: Not on file   • Highest education level: Not on file   Tobacco Use   • Smoking status: Never Smoker   • Smokeless tobacco: Never Used   Substance and Sexual Activity   • Alcohol use: No   • Drug use: No   • Sexual activity: Defer       Family History   Problem Relation Age of Onset   • Arthritis Mother    • Heart attack Mother    • Diabetes Father    • Heart attack Father    • Heart failure Sister    • Heart disease Brother    • Heart failure Brother    • Heart failure Sister    • Breast cancer Neg Hx    • Ovarian cancer Neg Hx        Current Medications:    Current Outpatient Medications:   •  acetaminophen (TYLENOL) 650 MG 8 hr tablet, Take 650 mg by mouth Daily., Disp: , Rfl:   •  aspirin 81 MG EC tablet, Take 81 mg by mouth Daily., Disp: , Rfl:   •  baclofen (LIORESAL) 10 MG tablet, Take 10 mg by mouth As Needed., Disp: , Rfl: 0  •  cetirizine (zyrTEC) 10 MG tablet, Take 10 mg by mouth Daily., Disp: , Rfl:   •  escitalopram (LEXAPRO) 20 MG tablet, Take 20 mg by " "mouth Daily., Disp: , Rfl:   •  furosemide (LASIX) 20 MG tablet, Take 20 mg by mouth As Needed., Disp: , Rfl:   •  hydroxychloroquine (PLAQUENIL) 200 MG tablet, Take 200 mg by mouth Daily., Disp: , Rfl:   •  lisinopril (PRINIVIL,ZESTRIL) 40 MG tablet, Take 40 mg by mouth Daily., Disp: , Rfl:   •  pantoprazole (PROTONIX) 40 MG EC tablet, Take 40 mg by mouth Daily., Disp: , Rfl:   •  traMADol (ULTRAM) 50 MG tablet, Take 50 mg by mouth As Needed., Disp: , Rfl:   •  carvedilol (COREG) 25 MG tablet, Take 1 tablet by mouth 2 (Two) Times a Day., Disp: 180 tablet, Rfl: 3     Review of Systems   Eyes: Negative for blurred vision.   Cardiovascular: Negative for chest pain, irregular heartbeat, near-syncope, orthopnea, palpitations and syncope.   Skin: Positive for flushing.   Neurological: Positive for light-headedness.   All other systems reviewed and are negative.      Vitals:    04/13/20 1024   BP: 170/68   BP Location: Left arm   Patient Position: Sitting   Pulse: 65   Weight: 62.1 kg (137 lb)   Height: 167.6 cm (66\")       Physical Exam   Constitutional: No distress.   Psychiatric: She has a normal mood and affect.       Diagnostic Data:  Procedures  No results found for: CHLPL, TRIG, HDL, LDLDIRECT  Lab Results   Component Value Date    GLUCOSE 85 09/25/2019    BUN 31 (H) 09/25/2019    CREATININE 1.00 09/25/2019     09/25/2019    K 4.8 09/25/2019     09/25/2019    CO2 30.0 (H) 09/25/2019     Lab Results   Component Value Date    HGBA1C 5.60 02/28/2018     Lab Results   Component Value Date    WBC 4.76 02/11/2019    HGB 12.1 02/11/2019    HCT 37.5 02/11/2019     02/11/2019       Assessment:   Diagnosis Plan   1. Essential hypertension         Plan:       1. HTN  -We will discontinue bisoprolol 20mg daily, will substitute coreg 25mg BID for bisoprolol to add alpha blocking activity  -Continue lisinopril 40mg daily  -nifedipine 30 mg, started 3/10  .  Has some issues with flushing and lightheadedness, " patient would like to discontinue, we will stop this today  -off maxzide due to fluctuating kidney function  -prn Lasix [facility]  [facility]  -Going to adjust blood pressure medications in a sequential fashion so that side effects can be tracked.  -Follow-up telephone visit in 2 weeks to reassess BP.  Can add hydralazine at next visit if blood pressure remains uncontrolled    2.  Hx TIA  -Felt to be due to aortic atheroma at that time, JEANETTE November 2018  -ASA, statin, patient states cholesterol 172 HDL 48, ,    -Carotid duplex May 2019 no hemodynamically significant stenosis bilateral  -symptoms were transient visual disturbance  -History of myalgias on 80 mg of atorvastatin  -Trial Crestor 10 mg daily    3.  Aortic insufficiency   -Echo September 2019 EF 60%, mild to moderate aortic regurgitation  -Continue to monitor    Follow-up telehealth call 2 weeks      Angelo Louie MD

## 2020-04-13 ENCOUNTER — OFFICE VISIT (OUTPATIENT)
Dept: CARDIOLOGY | Facility: CLINIC | Age: 80
End: 2020-04-13

## 2020-04-13 VITALS
BODY MASS INDEX: 22.02 KG/M2 | SYSTOLIC BLOOD PRESSURE: 170 MMHG | WEIGHT: 137 LBS | HEART RATE: 65 BPM | DIASTOLIC BLOOD PRESSURE: 68 MMHG | HEIGHT: 66 IN

## 2020-04-13 DIAGNOSIS — I10 ESSENTIAL HYPERTENSION: Primary | ICD-10-CM

## 2020-04-13 PROCEDURE — 99213 OFFICE O/P EST LOW 20 MIN: CPT | Performed by: INTERNAL MEDICINE

## 2020-04-13 RX ORDER — LISINOPRIL 40 MG/1
40 TABLET ORAL DAILY
COMMUNITY
Start: 2020-04-06 | End: 2020-07-15 | Stop reason: ALTCHOICE

## 2020-04-13 RX ORDER — ROSUVASTATIN CALCIUM 20 MG/1
10 TABLET, COATED ORAL DAILY
Qty: 30 TABLET | Refills: 11 | Status: SHIPPED | OUTPATIENT
Start: 2020-04-13 | End: 2020-07-15 | Stop reason: SDUPTHER

## 2020-04-13 RX ORDER — TRAMADOL HYDROCHLORIDE 50 MG/1
50 TABLET ORAL EVERY 6 HOURS PRN
COMMUNITY
Start: 2020-01-13 | End: 2022-06-25

## 2020-04-13 RX ORDER — NIFEDIPINE 30 MG/1
30 TABLET, FILM COATED, EXTENDED RELEASE ORAL NIGHTLY
COMMUNITY
Start: 2020-03-10 | End: 2020-04-13

## 2020-04-13 RX ORDER — SENNOSIDES 8.6 MG
650 CAPSULE ORAL AS NEEDED
Status: ON HOLD | COMMUNITY
End: 2022-05-09

## 2020-04-13 RX ORDER — FUROSEMIDE 20 MG/1
10 TABLET ORAL NIGHTLY PRN
COMMUNITY
Start: 2020-03-13

## 2020-04-13 RX ORDER — CARVEDILOL 25 MG/1
25 TABLET ORAL 2 TIMES DAILY
Qty: 180 TABLET | Refills: 3 | Status: SHIPPED | OUTPATIENT
Start: 2020-04-13 | End: 2020-06-04 | Stop reason: DRUGHIGH

## 2020-04-20 ENCOUNTER — TELEPHONE (OUTPATIENT)
Dept: CARDIOLOGY | Facility: CLINIC | Age: 80
End: 2020-04-20

## 2020-04-20 RX ORDER — HYDRALAZINE HYDROCHLORIDE 25 MG/1
25 TABLET, FILM COATED ORAL 3 TIMES DAILY
Qty: 90 TABLET | Refills: 3 | Status: SHIPPED | OUTPATIENT
Start: 2020-04-20 | End: 2020-04-30 | Stop reason: ALTCHOICE

## 2020-04-20 NOTE — TELEPHONE ENCOUNTER
Patient called with concerns of high BP in the evening consisting of 152/67, 158/74, 158/92. Patient states when her blood pressure goes up at night she can feel her heart beat more often. Pt is taking 25 mg Coreg BID,40 mg daily Lisinopril and was last seen in office on 04/13/20. Please advise.

## 2020-04-21 ENCOUNTER — TELEPHONE (OUTPATIENT)
Dept: CARDIOLOGY | Facility: CLINIC | Age: 80
End: 2020-04-21

## 2020-04-21 NOTE — TELEPHONE ENCOUNTER
Called patient back and advised that  wants to start her on 25 mg Hydralazine TID and it has already been sent into her pharmacy. PT verbalized understanding

## 2020-04-27 ENCOUNTER — TELEPHONE (OUTPATIENT)
Dept: CARDIOLOGY | Facility: CLINIC | Age: 80
End: 2020-04-27

## 2020-04-27 NOTE — TELEPHONE ENCOUNTER
Left patient voicemail advising to increase Hydralazine from 25 mg TID, to 50 MG TID and to keep record of BP/HR and update us next week.

## 2020-04-27 NOTE — TELEPHONE ENCOUNTER
Patient called to report she is still having increased blood pressures even after adding Hydralazine to her meds last week.  Last week her readings were 150's systolic and now they are running in the 170's.  She states she does take her am BP after taking her AM meds.  Advised will check with Dr. Louie for further instructions.

## 2020-04-27 NOTE — TELEPHONE ENCOUNTER
Lets have her start taking 50 mg 3 times daily and see if this helps reduce the blood pressure further.

## 2020-04-29 NOTE — PROGRESS NOTES
[facility] at [facility]  Follow Up Visit  Kelsey Avendano  1940    VISIT DATE:  04/30/20    PCP:   Kathleen Quiroga, DORIS  [address]   [place] [id number]          CC:  Essential hypertension (f/u)      Problem List:  1. TIA; on aspirin (not daily); right sided visual deficit:  a. MRI, 02/28/2018: Age-appropriate diffuse generalized cerebral atrophy with gliosis in the white matter and ill defined oat infarct in the cerebellum on the left with no acute areas of ischemia  b. MRA, 02/28/2018: Neck, slightly tortuous vessels consistent with atherosclerotic changes and no focal areas of stenosis. Antegrade flow noted bilaterally.  c. Echocardiogram, 02/28/2018: EF 60-65%, grade 2 diastolic dysfunction with mildly dilated right atrial cavity is moderate pulmonary hypertension.  d. Holter 3/2018: Normal sinus rhythm/sinus bradycardia at 58 bpm.  e. JEANETTE, 11/06/2018: EF 60%. Moderate AI, aortic valve leaflets appear rheumatic. Mild-to-moderate MR/TR. Most likely source of patient's TIA is aortic atheroma.  2. Aortic insufficiency:  a. JEANETTE, 11/06/2018: EF 60%. Moderate AI, aortic valve leaflets appear rheumatic. Mild-to-moderate MR/TR.   b. Echocardiogram, 09/17/2019: EF 60%. Mild-to-mod AI. Trace-to-mild MR. Mild TR.  3. Chronic diastolic heart failure  a. [facility] in 2008: Reportedly normal, data insufficient  4. Hypertension.  5. Hyperlipidemia.  6. Hx CVA.  7. GERD.  8. Lupus.  9. Surgeries:  a. Partial replacement bilateral knees    History of Present Illness:  Kelsey Avendano  Is a 80 y.o. female with pertinent cardiac history detailed above.  Patient following up for hypertension.  I did a telehealth visit with her 2 weeks ago.  She was having side effects with nifedipine so this was stopped.  Coreg was substituted for bisoprolol to further treat her blood pressure.  She was continued on lisinopril.  After a week of these changes her blood pressure was still elevated so  hydralazine was added.  She had problems with chest pain,  Diarrhea, headaches.  She reported symptoms were resolved after stopping hydralazine.  BP is still elevated today.    Reviewed all of her current and previous medications.  She still does have an up-to-date supply of terazosin.  Looking back it look like this was held because she reported an episode of syncope.  When I questioned the patient about this she denied any episodes of syncope or presyncope in the last year.  I asked her to start back with 10 mg nightly, she was previously taking it twice daily.  If after 2 days blood pressures not significantly changed resume twice daily dosing.  I asked her to call me with an update next week if it is not improving and will adjust medications further.  She reports compliance with the Coreg and lisinopril.  She has had an occasional extra heartbeat or palpitation that she noted and I did appreciate 1 on exam today.  It cannot be captured on EKG here rhythm strip however.  Her EKG shows signs of LVH and nonspecific ST-T wave changes which have been present chronically.  No other acute complaints today      Patient Active Problem List    Diagnosis Date Noted   • Moderate aortic insufficiency 09/25/2019   • Mild mitral regurgitation 08/20/2019   • Non-rheumatic tricuspid valve insufficiency 08/20/2019   • Acute on chronic diastolic CHF (congestive heart failure) (CMS/[facility]) 08/20/2019   • Palpitations 03/22/2018   • TIA (transient ischemic attack) 02/28/2018   • Depression 01/26/2018   • Gastroesophageal reflux disease 01/26/2018   • Cystocele with rectocele 01/26/2018   • Annual GYN exam w/o problem 04/24/2017     Note Last Updated: 1/26/2018     SCREENING TESTS    Year 2012 2013 2014 2015 2016 2017 2018 2019 2020 2021 2022 2023 2024 2025 2026 2027 2028 2029 2030 2031   Age      77                 PAP                       HPV high risk                       BILL [Birads]     2 [1] 4 [1]                 FAHAD score       "                 Colonoscopy                       DEXA [T-score]  Frax [hip/any]                       Lipids  [LDL / HDL / TG]                       Vitamin D                       Ovarian Screen                         Enter the month test was performed.  If month not known, enter \"X'  · Black numbers = normal results  · Red numbers = abnormal results  · Black X = patient reported normal  · Red X - patient reported abnormal      Referred by:    Profession:    Other info:           • Essential hypertension 03/05/2009   • Systemic lupus erythematosus (CMS/HCC) 08/28/2008   • Osteoporosis 07/17/2007       Allergies   Allergen Reactions   • Hydralazine Hcl Other (See Comments)     Chest pains, headache, diarrhea   • Nifedipine Er Other (See Comments)     flushing       Social History     Socioeconomic History   • Marital status:      Spouse name: Not on file   • Number of children: Not on file   • Years of education: Not on file   • Highest education level: Not on file   Tobacco Use   • Smoking status: Never Smoker   • Smokeless tobacco: Never Used   Substance and Sexual Activity   • Alcohol use: No   • Drug use: No   • Sexual activity: Defer       Family History   Problem Relation Age of Onset   • Arthritis Mother    • Heart attack Mother    • Diabetes Father    • Heart attack Father    • Heart failure Sister    • Heart disease Brother    • Heart failure Brother    • Heart failure Sister    • Breast cancer Neg Hx    • Ovarian cancer Neg Hx        Current Medications:    Current Outpatient Medications:   •  acetaminophen (TYLENOL) 650 MG 8 hr tablet, Take 650 mg by mouth Daily., Disp: , Rfl:   •  aspirin 81 MG EC tablet, Take 81 mg by mouth Daily., Disp: , Rfl:   •  baclofen (LIORESAL) 10 MG tablet, Take 10 mg by mouth As Needed., Disp: , Rfl: 0  •  carvedilol (COREG) 25 MG tablet, Take 1 tablet by mouth 2 (Two) Times a Day., Disp: 180 tablet, Rfl: 3  •  cetirizine (zyrTEC) 10 MG tablet, Take 10 mg by mouth " "Daily., Disp: , Rfl:   •  escitalopram (LEXAPRO) 20 MG tablet, Take 20 mg by mouth Daily., Disp: , Rfl:   •  furosemide (LASIX) 20 MG tablet, Take 20 mg by mouth As Needed., Disp: , Rfl:   •  hydroxychloroquine (PLAQUENIL) 200 MG tablet, Take 200 mg by mouth Daily., Disp: , Rfl:   •  lisinopril (PRINIVIL,ZESTRIL) 40 MG tablet, Take 40 mg by mouth Daily., Disp: , Rfl:   •  pantoprazole (PROTONIX) 40 MG EC tablet, Take 40 mg by mouth Daily., Disp: , Rfl:   •  rosuvastatin (CRESTOR) 20 MG tablet, Take 0.5 tablets by mouth Daily., Disp: 30 tablet, Rfl: 11  •  traMADol (ULTRAM) 50 MG tablet, Take 50 mg by mouth As Needed., Disp: , Rfl:   •  terazosin (HYTRIN) 10 MG capsule, Take 1 capsule by mouth Every 12 (Twelve) Hours., Disp: 60 capsule, Rfl: 6     Review of Systems   Constitution: Negative for fever.   Cardiovascular: Positive for irregular heartbeat and palpitations. Negative for chest pain, dyspnea on exertion and syncope.   Respiratory: Negative for cough and shortness of breath.    Neurological: Negative for headaches.   All other systems reviewed and are negative.      Vitals:    04/30/20 1521   BP: 179/79   BP Location: Right arm   Patient Position: Sitting   Pulse: 72   Weight: 63 kg (139 lb)   Height: 167.6 cm (66\")       Physical Exam   Constitutional: She is oriented to person, place, and time. She appears well-developed and well-nourished. No distress.   HENT:   Head: Normocephalic and atraumatic.   Eyes: EOM are normal.   Neck: Neck supple.   No carotid bruit   Cardiovascular: Normal rate, regular rhythm and intact distal pulses.   Occasional extrasystole   Pulmonary/Chest: Effort normal and breath sounds normal.   Abdominal: Soft.   Musculoskeletal: Normal range of motion. She exhibits no edema.   Neurological: She is alert and oriented to person, place, and time.   Skin: Skin is warm and dry.   Psychiatric: She has a normal mood and affect.       Diagnostic Data:    ECG 12 Lead  Date/Time: 4/30/2020 " 4:12 PM  Performed by: Angelo Louie MD  Authorized by: Angelo Louie MD   Comparison: compared with previous ECG from 2/11/2019  Similar to previous ECG  Rhythm: sinus rhythm  Rate: normal  BPM: 63  QRS axis: normal  Other findings: non-specific ST-T wave changes and left ventricular hypertrophy    Clinical impression: abnormal EKG          No results found for: CHLPL, TRIG, HDL, LDLDIRECT  Lab Results   Component Value Date    GLUCOSE 85 09/25/2019    BUN 31 (H) 09/25/2019    CREATININE 1.00 09/25/2019     09/25/2019    K 4.8 09/25/2019     09/25/2019    CO2 30.0 (H) 09/25/2019     Lab Results   Component Value Date    HGBA1C 5.60 02/28/2018     Lab Results   Component Value Date    WBC 4.76 02/11/2019    HGB 12.1 02/11/2019    HCT 37.5 02/11/2019     02/11/2019       Assessment:   Diagnosis Plan   1. Essential hypertension         Plan:    1. HTN  -Continue Coreg 25 mg twice daily, lisinopril 40 mg daily  -Hydralazine started last visit but she could not tolerate this due to side effects.  -Could not tolerate nifedipine due to side effects  -off maxzide due to fluctuating kidney function  -prn Lasix per nephrology associates  -reports renal duplex at [facility], unremarkable per patient  -Start terazosin back at 10 mg nightly, if no significant improvement in blood pressure increase to twice daily after 2 days.  This was her previous chronic dose  -If no significant improvement in blood pressure next week we will need to add an additional agent, potentially clonidine given intolerances or therapeutic failures of multiple agents in the past  -Consideration of [place] trial of no significant improvement blood pressure     2.  Hx TIA  -[place] to be due to aortic atheroma at that time, JEANETTE November 2018  -ASA, statin, patient states cholesterol 172 HDL 48, ,    -Carotid duplex May 2019 no hemodynamically significant stenosis bilateral  -symptoms were transient visual  disturbance  -History of myalgias on 80 mg of atorvastatin  -Tolerating Crestor 10 mg daily     3.  Aortic insufficiency   -Echo September 2019 EF 60%, mild to moderate aortic regurgitation  -Continue to monitor     EKG checked in clinic today and no significant changes from previous priors    Follow-up 1 month      Angelo Louie MD

## 2020-04-30 ENCOUNTER — OFFICE VISIT (OUTPATIENT)
Dept: CARDIOLOGY | Facility: CLINIC | Age: 80
End: 2020-04-30

## 2020-04-30 VITALS
WEIGHT: 139 LBS | BODY MASS INDEX: 22.34 KG/M2 | DIASTOLIC BLOOD PRESSURE: 79 MMHG | HEART RATE: 72 BPM | SYSTOLIC BLOOD PRESSURE: 179 MMHG | HEIGHT: 66 IN

## 2020-04-30 DIAGNOSIS — I10 ESSENTIAL HYPERTENSION: Primary | ICD-10-CM

## 2020-04-30 PROCEDURE — 99213 OFFICE O/P EST LOW 20 MIN: CPT | Performed by: INTERNAL MEDICINE

## 2020-04-30 PROCEDURE — 93000 ELECTROCARDIOGRAM COMPLETE: CPT | Performed by: INTERNAL MEDICINE

## 2020-04-30 RX ORDER — TERAZOSIN 10 MG/1
10 CAPSULE ORAL EVERY 12 HOURS
Qty: 60 CAPSULE | Refills: 6 | Status: SHIPPED | OUTPATIENT
Start: 2020-04-30 | End: 2020-05-11 | Stop reason: SINTOL

## 2020-05-11 ENCOUNTER — TELEPHONE (OUTPATIENT)
Dept: CARDIOLOGY | Facility: CLINIC | Age: 80
End: 2020-05-11

## 2020-05-11 RX ORDER — CLONIDINE HYDROCHLORIDE 0.1 MG/1
0.1 TABLET ORAL 2 TIMES DAILY
Qty: 60 TABLET | Refills: 5 | Status: SHIPPED | OUTPATIENT
Start: 2020-05-11 | End: 2020-07-01

## 2020-05-11 NOTE — TELEPHONE ENCOUNTER
Patient called to report that since she started Terazosin she has had large spots in front of her eyes as well as brightness.  She reports she even went to opthalmologicprovider because her PCP thought she might have a detatched retina.  She does not, she looked up the side effects of terazosin and that is one of the side effects.  She requests an alternative.

## 2020-05-11 NOTE — TELEPHONE ENCOUNTER
Can we send her a prescription for clonidine 0.1MG bid to start for hypertension in place of the TERAZOSIN

## 2020-05-11 NOTE — TELEPHONE ENCOUNTER
Spoke with patient, advised to stop terazosin and begin clonidine 0.1 mg BID.  Understanding verbalized.

## 2020-06-04 ENCOUNTER — TELEPHONE (OUTPATIENT)
Dept: CARDIOLOGY | Facility: CLINIC | Age: 80
End: 2020-06-04

## 2020-06-04 RX ORDER — CARVEDILOL 12.5 MG/1
12.5 TABLET ORAL 2 TIMES DAILY
Qty: 60 TABLET | Refills: 5 | Status: ON HOLD | OUTPATIENT
Start: 2020-06-04 | End: 2022-05-09 | Stop reason: DRUGHIGH

## 2020-06-04 NOTE — TELEPHONE ENCOUNTER
Patient notified to decrease her Carvedilol to 12.5 mg in the morning and night and see how she feels with that. She verbalized understanding.

## 2020-06-04 NOTE — TELEPHONE ENCOUNTER
She believes that her Carvedilol 25 mg twice a day is causing her to be light headed and fatigued. B/P @ 160/67 and after she takes her medication B/P @ 111/61. She wants to know if it can be reduced? Please advise.

## 2020-07-01 ENCOUNTER — TELEPHONE (OUTPATIENT)
Dept: CARDIOLOGY | Facility: CLINIC | Age: 80
End: 2020-07-01

## 2020-07-01 RX ORDER — CLONIDINE HYDROCHLORIDE 0.2 MG/1
0.1 TABLET ORAL 2 TIMES DAILY
Qty: 60 TABLET | Refills: 6 | Status: SHIPPED | OUTPATIENT
Start: 2020-07-01 | End: 2020-08-18 | Stop reason: SDUPTHER

## 2020-07-01 NOTE — TELEPHONE ENCOUNTER
Patient called with concerns about BP. She states that her BP runs between 150's-200's SBP. She has tried many different medications for hypertension, and most of them causes retinal issues.     06/29 (AM): 211/94  06/30 (PM): 164/68  07/01 (AM): 189/76      Please advise.

## 2020-07-01 NOTE — TELEPHONE ENCOUNTER
We can have her increase the clonidine to 0.2 mg twice daily.  Does she have an ophthalmologist that she sees?.  Taking Plaquenil can cause visual changes and retinal problems and if she has not seen an eye doctor we can make a referral for that as well.

## 2020-07-01 NOTE — TELEPHONE ENCOUNTER
Called patient to let them know recommendations per NSK (see NSK note).    Patient agrees to plan and verbalized understanding.

## 2020-07-14 NOTE — PROGRESS NOTES
[facility] at [facility]  Follow Up Visit  Kelsey Avendano  1940    VISIT DATE:  07/15/20    PCP:   Kathleen Quiroga, DORIS  [address]   [place] [id number]          CC:  Essential hypertension      Problem List:  1. TIA; on aspirin (not daily); right sided visual deficit:  a. MRI, 02/28/2018: Age-appropriate diffuse generalized cerebral atrophy with gliosis in the white matter and ill defined oat infarct in the cerebellum on the left with no acute areas of ischemia  b. MRA, 02/28/2018: Neck, slightly tortuous vessels consistent with atherosclerotic changes and no focal areas of stenosis. Antegrade flow noted bilaterally.  c. Echocardiogram, 02/28/2018: EF 60-65%, grade 2 diastolic dysfunction with mildly dilated right atrial cavity is moderate pulmonary hypertension.  d. Holter 3/2018: Normal sinus rhythm/sinus bradycardia at 58 bpm.  e. JEANETTE, 11/06/2018: EF 60%. Moderate AI, aortic valve leaflets appear rheumatic. Mild-to-moderate MR/TR. Most likely source of patient's TIA is aortic atheroma.  2. Aortic insufficiency:  a. JEANETTE, 11/06/2018: EF 60%. Moderate AI, aortic valve leaflets appear rheumatic. Mild-to-moderate MR/TR.   b. Echocardiogram, 09/17/2019: EF 60%. Mild-to-mod AI. Trace-to-mild MR. Mild TR.  3. Chronic diastolic heart failure  a. [facility] in 2008: Reportedly normal, data insufficient  4. Hypertension.  5. Hyperlipidemia.  6. Hx CVA.  7. GERD.  8. Lupus.  9. Surgeries:  a. Partial replacement bilateral knees    History of Present Illness:  Kelsey Avendano  Is a 80 y.o. female with pertinent cardiac history detailed above.  She has side effects and intolerances with multiple medications.  Since I last saw her she was put on clonidine 0.2 mg twice daily and her Coreg needed to be decreased to 12.5mg   Her EKG last visit shows signs of LVH and nonspecific ST-T wave changes which have been present chronically.  Has been having some visual disturbances and saw  "opthalmology at [facility], reported no abnormal findings .  She had visual symptoms when she had a TIA in 2018, etiology is thought to be related to aortic atheroma.  She is compliant with her aspirin.  She is complaining of some side effects that she thinks are related to medication including cough, constipation, and she attributes the visual changes to blood pressure medications.  She is on Plaquenil for history of lupus but with recent ophthalmology eval this did not seem to be playing a part in her visual changes      Patient Active Problem List    Diagnosis Date Noted   • Moderate aortic insufficiency 09/25/2019   • Mild mitral regurgitation 08/20/2019   • Non-rheumatic tricuspid valve insufficiency 08/20/2019   • Acute on chronic diastolic CHF (congestive heart failure) (CMS/[facility]) 08/20/2019   • Palpitations 03/22/2018   • TIA (transient ischemic attack) 02/28/2018   • Depression 01/26/2018   • Gastroesophageal reflux disease 01/26/2018   • Cystocele with rectocele 01/26/2018   • Annual GYN exam w/o problem 04/24/2017     Note Last Updated: 1/26/2018     SCREENING TESTS    Year 2012 2013 2014 2015 2016 2017 2018 2019 2020 2021 2022 2023 2024 2025 2026 2027 2028 2029 2030 2031   Age      77                 PAP                       HPV high risk                       BILL [Birads]     2 [1] 4 [1]                 FAHAD score                       Colonoscopy                       DEXA [T-score]  Frax [hip/any]                       Lipids  [LDL / HDL / TG]                       Vitamin D                       Ovarian Screen                         Enter the month test was performed.  If month not known, enter \"X'  · Black numbers = normal results  · Red numbers = abnormal results  · Black X = patient reported normal  · Red X - patient reported abnormal      Referred by:    Profession:    Other info:           • Essential hypertension 03/05/2009   • Systemic lupus erythematosus (CMS/[facility]) 08/28/2008   • " "Osteoporosis 07/17/2007       Allergies   Allergen Reactions   • Hydralazine Hcl Other (See Comments)     Chest pains, headache, diarrhea   • Hytrin [Terazosin] Other (See Comments)     Visual issues- \"bright spots in eyes\" 6/2020   • Maxzide [Hydrochlorothiazide W-Triamterene] Other (See Comments)     Kidney issues 06/30/20     • Nifedipine Er Other (See Comments)     flushing       Social History     Socioeconomic History   • Marital status:      Spouse name: Not on file   • Number of children: Not on file   • Years of education: Not on file   • Highest education level: Not on file   Tobacco Use   • Smoking status: Never Smoker   • Smokeless tobacco: Never Used   Substance and Sexual Activity   • Alcohol use: No   • Drug use: No   • Sexual activity: Defer       Family History   Problem Relation Age of Onset   • Arthritis Mother    • Heart attack Mother    • Diabetes Father    • Heart attack Father    • Heart failure Sister    • Heart disease Brother    • Heart failure Brother    • Heart failure Sister    • Breast cancer Neg Hx    • Ovarian cancer Neg Hx        Current Medications:    Current Outpatient Medications:   •  acetaminophen (TYLENOL) 650 MG 8 hr tablet, Take 650 mg by mouth As Needed., Disp: , Rfl:   •  aspirin 81 MG EC tablet, Take 81 mg by mouth Daily., Disp: , Rfl:   •  baclofen (LIORESAL) 10 MG tablet, Take 10 mg by mouth As Needed., Disp: , Rfl: 0  •  carvedilol (COREG) 12.5 MG tablet, Take 1 tablet by mouth 2 (Two) Times a Day., Disp: 60 tablet, Rfl: 5  •  cetirizine (zyrTEC) 10 MG tablet, Take 10 mg by mouth Daily., Disp: , Rfl:   •  cloNIDine (CATAPRES) 0.2 MG tablet, Take 0.5 tablets by mouth 2 (Two) Times a Day. DOSE ADJUSTMENT, Disp: 60 tablet, Rfl: 6  •  escitalopram (LEXAPRO) 20 MG tablet, Take 20 mg by mouth Daily., Disp: , Rfl:   •  furosemide (LASIX) 20 MG tablet, Take 10 mg by mouth As Needed., Disp: , Rfl:   •  hydroxychloroquine (PLAQUENIL) 200 MG tablet, Take 200 mg by mouth " "Daily., Disp: , Rfl:   •  pantoprazole (PROTONIX) 40 MG EC tablet, Take 40 mg by mouth 2 (two) times a day., Disp: , Rfl:   •  rosuvastatin (CRESTOR) 20 MG tablet, Take 1 tablet by mouth Daily., Disp: 30 tablet, Rfl: 11  •  traMADol (ULTRAM) 50 MG tablet, Take 50 mg by mouth As Needed., Disp: , Rfl:   •  Azilsartan Medoxomil 40 MG tablet, Take 40 mg by mouth Daily., Disp: 30 tablet, Rfl: 6     Review of Systems   Constitution: Positive for malaise/fatigue.   HENT:        Visual changes   Cardiovascular: Negative for chest pain, dyspnea on exertion and irregular heartbeat.   All other systems reviewed and are negative.      Vitals:    07/15/20 1333   BP: 158/68   BP Location: Right arm   Patient Position: Sitting   Pulse: 61   Temp: 98 °F (36.7 °C)   SpO2: 98%   Weight: 62.1 kg (137 lb)   Height: 166.4 cm (65.5\")       Physical Exam   Constitutional: She is oriented to person, place, and time. She appears well-developed and well-nourished.   Eyes: EOM are normal.   Neck: Neck supple.   No bruits   Cardiovascular: Normal rate, regular rhythm, normal heart sounds and intact distal pulses.   Pulmonary/Chest: Effort normal and breath sounds normal.   Neurological: She is alert and oriented to person, place, and time.   Skin: Skin is warm and dry.       Diagnostic Data:  Procedures  No results found for: CHLPL, TRIG, HDL, LDLDIRECT  Lab Results   Component Value Date    GLUCOSE 85 09/25/2019    BUN 31 (H) 09/25/2019    CREATININE 1.00 09/25/2019     09/25/2019    K 4.8 09/25/2019     09/25/2019    CO2 30.0 (H) 09/25/2019     Lab Results   Component Value Date    HGBA1C 5.60 02/28/2018     Lab Results   Component Value Date    WBC 4.76 02/11/2019    HGB 12.1 02/11/2019    HCT 37.5 02/11/2019     02/11/2019       Assessment:  No diagnosis found.    Plan:    1. HTN-difficult to control, multiple drug intolerances  -Continue Coreg 12.5 mg twice daily (complained of lightheadedness on higher doses),   -Could " not tolerate hydralazine due to side effects  -Could not tolerate nifedipine due to side effects  -off maxzide due to fluctuating kidney function  -prn Lasix per nephrology associates  -reports renal duplex at [facility], unremarkable per patient  -terazosin caused her to see spots.  -Clonidine 0.2 mg twice daily  -substitiute edarbi 40mg for lisinopril 40mg due to cough side effects     2.  Hx TIA  -[place] to be due to aortic atheroma at that time, JEANETTE November 2018  -ASA, statin, patient states cholesterol 172 HDL 48, ,    -Carotid duplex May 2019 no hemodynamically significant stenosis bilateral  -symptoms were transient visual disturbance  -History of myalgias on 80 mg of atorvastatin  -Tolerating Crestor 10 mg daily, increase to 20 mg daily for better control     3.  Aortic insufficiency   -Echo September 2019 EF 60%, mild to moderate aortic regurgitation  -Continue to monitor    4.  Visual disturbances  -Has followed up with ophthalmology without any abnormalities reported per patient  -Continue to adjust blood pressure medications  -Further try to reduce stroke risk with treating stroke more aggressively as above    Follow-up 2 months    Angelo Louie MD

## 2020-07-15 ENCOUNTER — OFFICE VISIT (OUTPATIENT)
Dept: CARDIOLOGY | Facility: CLINIC | Age: 80
End: 2020-07-15

## 2020-07-15 VITALS
HEART RATE: 61 BPM | TEMPERATURE: 98 F | BODY MASS INDEX: 22.02 KG/M2 | SYSTOLIC BLOOD PRESSURE: 158 MMHG | DIASTOLIC BLOOD PRESSURE: 68 MMHG | WEIGHT: 137 LBS | OXYGEN SATURATION: 98 % | HEIGHT: 66 IN

## 2020-07-15 DIAGNOSIS — I10 ESSENTIAL HYPERTENSION: Primary | ICD-10-CM

## 2020-07-15 PROCEDURE — 99213 OFFICE O/P EST LOW 20 MIN: CPT | Performed by: INTERNAL MEDICINE

## 2020-07-15 RX ORDER — ROSUVASTATIN CALCIUM 20 MG/1
20 TABLET, COATED ORAL DAILY
Qty: 30 TABLET | Refills: 11 | Status: ON HOLD | OUTPATIENT
Start: 2020-07-15 | End: 2022-05-09 | Stop reason: DRUGHIGH

## 2020-07-24 ENCOUNTER — TELEPHONE (OUTPATIENT)
Dept: CARDIOLOGY | Facility: CLINIC | Age: 80
End: 2020-07-24

## 2020-07-24 NOTE — TELEPHONE ENCOUNTER
Patient called with concerns about BP. She states that her BP is still in the 180's - 200's SBP especially in the mornings.    During the afternoon, she states her BP is around 150's-170's SBP.    Verified patient medications in Epic.    Please advise.

## 2020-08-17 ENCOUNTER — TELEPHONE (OUTPATIENT)
Dept: CARDIOLOGY | Facility: CLINIC | Age: 80
End: 2020-08-17

## 2020-08-17 NOTE — TELEPHONE ENCOUNTER
"Patient called with continuing concerns about BP. She states that her BP is still remaining elevated 170's and up SBP, especially in the evenings. She states that the carvedilol she is taking causes her to see \"dark honeycomb like spots\" in her right eye.     She would like to be seen by NSK to discuss options to control BP.     Scheduled patient for 08/18/2020 @ 1:15pm    "

## 2020-08-17 NOTE — PROGRESS NOTES
[facility] at [facility]  Follow Up Visit  Kelsey Avendano  1940    VISIT DATE:  08/18/20    PCP:   Kathleen Quiroga, DORIS  [address]   [place] [id number]          CC:  Essential hypertension      Problem List:  1. TIA; on aspirin (not daily); right sided visual deficit:  a. MRI, 02/28/2018: Age-appropriate diffuse generalized cerebral atrophy with gliosis in the white matter and ill defined oat infarct in the cerebellum on the left with no acute areas of ischemia  b. MRA, 02/28/2018: Neck, slightly tortuous vessels consistent with atherosclerotic changes and no focal areas of stenosis. Antegrade flow noted bilaterally.  c. Echocardiogram, 02/28/2018: EF 60-65%, grade 2 diastolic dysfunction with mildly dilated right atrial cavity is moderate pulmonary hypertension.  d. Holter 3/2018: Normal sinus rhythm/sinus bradycardia at 58 bpm.  e. JEANETTE, 11/06/2018: EF 60%. Moderate AI, aortic valve leaflets appear rheumatic. Mild-to-moderate MR/TR. Most likely source of patient's TIA is aortic atheroma.  2. Aortic insufficiency:  a. JEANETTE, 11/06/2018: EF 60%. Moderate AI, aortic valve leaflets appear rheumatic. Mild-to-moderate MR/TR.   b. Echocardiogram, 09/17/2019: EF 60%. Mild-to-mod AI. Trace-to-mild MR. Mild TR.  3. Chronic diastolic heart failure  a. [facility] in 2008: Reportedly normal, data insufficient  4. Hypertension.  5. Hyperlipidemia.  6. Hx CVA.  7. GERD.  8. Lupus.  9. Surgeries:  a. Partial replacement bilateral knees       History of Present Illness:  Kelsey Avendano  Is a 80 y.o. female with pertinent cardiac history detailed above.  Following up for difficult to control hypertension.  She has side effects and intolerances to multiple medications feels Coreg is causing visual side effects.  At last appointment was complaining of cough so lisinopril was changed to Edarbi 40 mg and uptitrated 80 mg.  Patient states that she takes the Edarbi, clonidine, and the carvedilol  "together she will feel very drained and need to lay down.  She checks her blood pressures during these times it is within normal limits not excessively low, normally systolics 120s.  Today she states she took only carvedilol and her blood pressure is 160/60.  She is cut this carvedilol dose down to 6.25 because of the possible right-sided visual disturbances.  She does have a history of a TIA which was primarily manifested with visual disturbances.  Had a recent ophthalmologic exam without abnormalities found.  Does take Plaquenil for her SLE.  Carotid duplex 2019 without stenosis.  Denies chest pain or other complaints      Patient Active Problem List    Diagnosis Date Noted   • Moderate aortic insufficiency 09/25/2019   • Mild mitral regurgitation 08/20/2019   • Non-rheumatic tricuspid valve insufficiency 08/20/2019   • Acute on chronic diastolic CHF (congestive heart failure) (CMS/[facility]) 08/20/2019   • Palpitations 03/22/2018   • TIA (transient ischemic attack) 02/28/2018   • Depression 01/26/2018   • Gastroesophageal reflux disease 01/26/2018   • Cystocele with rectocele 01/26/2018   • Annual GYN exam w/o problem 04/24/2017     Note Last Updated: 1/26/2018     SCREENING TESTS    Year 2012 2013 2014 2015 2016 2017 2018 2019 2020 2021 2022 2023 2024 2025 2026 2027 2028 2029 2030 2031   Age      77                 PAP                       HPV high risk                       BILL [Birads]     2 [1] 4 [1]                 FAHAD score                       Colonoscopy                       DEXA [T-score]  Frax [hip/any]                       Lipids  [LDL / HDL / TG]                       Vitamin D                       Ovarian Screen                         Enter the month test was performed.  If month not known, enter \"X'  · Black numbers = normal results  · Red numbers = abnormal results  · Black X = patient reported normal  · Red X - patient reported abnormal      Referred by:    Profession:    Other info:           • " "Essential hypertension 03/05/2009   • Systemic lupus erythematosus (CMS/HCC) 08/28/2008   • Osteoporosis 07/17/2007       Allergies   Allergen Reactions   • Hydralazine Hcl Other (See Comments)     Chest pains, headache, diarrhea   • Hytrin [Terazosin] Other (See Comments)     Visual issues- \"bright spots in eyes\" 6/2020   • Maxzide [Hydrochlorothiazide W-Triamterene] Other (See Comments)     Kidney issues 06/30/20     • Nifedipine Er Other (See Comments)     flushing       Social History     Socioeconomic History   • Marital status:      Spouse name: Not on file   • Number of children: Not on file   • Years of education: Not on file   • Highest education level: Not on file   Tobacco Use   • Smoking status: Never Smoker   • Smokeless tobacco: Never Used   Substance and Sexual Activity   • Alcohol use: No   • Drug use: No   • Sexual activity: Defer       Family History   Problem Relation Age of Onset   • Arthritis Mother    • Heart attack Mother    • Diabetes Father    • Heart attack Father    • Heart failure Sister    • Heart disease Brother    • Heart failure Brother    • Heart failure Sister    • Breast cancer Neg Hx    • Ovarian cancer Neg Hx        Current Medications:    Current Outpatient Medications:   •  acetaminophen (TYLENOL) 650 MG 8 hr tablet, Take 650 mg by mouth As Needed., Disp: , Rfl:   •  aspirin 81 MG EC tablet, Take 81 mg by mouth Daily., Disp: , Rfl:   •  azilsartan medoxomil (EDARBI) 80 MG tablet tablet, Take 1 tablet by mouth Daily. DOSE ADJUSTMENT, Disp: 30 tablet, Rfl: 3  •  baclofen (LIORESAL) 10 MG tablet, Take 10 mg by mouth As Needed., Disp: , Rfl: 0  •  carvedilol (COREG) 12.5 MG tablet, Take 1 tablet by mouth 2 (Two) Times a Day. (Patient taking differently: Take 6.25 mg by mouth 2 (Two) Times a Day.), Disp: 60 tablet, Rfl: 5  •  cetirizine (zyrTEC) 10 MG tablet, Take 10 mg by mouth Daily., Disp: , Rfl:   •  cloNIDine (CATAPRES) 0.2 MG tablet, Take 0.5 tablets by mouth 2 (Two) " "Times a Day. DOSE ADJUSTMENT, Disp: 60 tablet, Rfl: 6  •  escitalopram (LEXAPRO) 20 MG tablet, Take 20 mg by mouth Daily., Disp: , Rfl:   •  furosemide (LASIX) 20 MG tablet, Take 10 mg by mouth As Needed., Disp: , Rfl:   •  hydroxychloroquine (PLAQUENIL) 200 MG tablet, Take 200 mg by mouth Daily., Disp: , Rfl:   •  pantoprazole (PROTONIX) 40 MG EC tablet, Take 40 mg by mouth 2 (two) times a day., Disp: , Rfl:   •  rosuvastatin (CRESTOR) 20 MG tablet, Take 1 tablet by mouth Daily., Disp: 30 tablet, Rfl: 11  •  traMADol (ULTRAM) 50 MG tablet, Take 50 mg by mouth As Needed., Disp: , Rfl:      Review of Systems   Constitution: Positive for malaise/fatigue.   Eyes: Positive for blurred vision.        On the right   Cardiovascular: Negative for chest pain, dyspnea on exertion, irregular heartbeat and leg swelling.   All other systems reviewed and are negative.      Vitals:    08/18/20 1300   BP: 164/60   BP Location: Right arm   Patient Position: Sitting   Cuff Size: Adult   Pulse: 55   Temp: 98 °F (36.7 °C)   SpO2: 99%   Weight: 62.1 kg (137 lb)   Height: 166.4 cm (65.5\")       Physical Exam   Constitutional: She is oriented to person, place, and time. She appears well-developed and well-nourished.   Eyes: EOM are normal.   Neck: Neck supple.   No bruits auscultated   Cardiovascular: Normal rate, regular rhythm and normal heart sounds.   Pulmonary/Chest: Effort normal and breath sounds normal.   Abdominal: Soft.   Neurological: She is alert and oriented to person, place, and time.   Psychiatric: She has a normal mood and affect.       Diagnostic Data:  Procedures  No results found for: CHLPL, TRIG, HDL, LDLDIRECT  Lab Results   Component Value Date    GLUCOSE 85 09/25/2019    BUN 31 (H) 09/25/2019    CREATININE 1.00 09/25/2019     09/25/2019    K 4.8 09/25/2019     09/25/2019    CO2 30.0 (H) 09/25/2019     Lab Results   Component Value Date    HGBA1C 5.60 02/28/2018     Lab Results   Component Value Date    " WBC 4.76 02/11/2019    HGB 12.1 02/11/2019    HCT 37.5 02/11/2019     02/11/2019       Assessment:   Diagnosis Plan   1. TIA (transient ischemic attack)  CT Angiogram Neck       Plan:    1. HTN-difficult to control, multiple drug intolerances  -holding beta blocker (? Side effects of visual disturbances, however visual disturbances have been a long-term complaint of her)  -Could not tolerate hydralazine due to side effects  -Could not tolerate nifedipine due to side effects  -off maxzide due to fluctuating kidney function  -prn Lasix per [facility]  -[facility] patient  -terazosin caused her to see spots.  -Clonidine 0.2 mg twice daily (increased today)  -On Edarbi 80 mg     2.  Hx TIA  -Raymond to be due to aortic atheroma at that time, JEANETTE November 2018  -ASA, statin, patient states cholesterol 172 HDL 48, ,    -Carotid duplex May 2019 no hemodynamically significant stenosis bilateral  -symptoms were transient visual disturbance, again complaining of right-sided visual complaints  -Tolerating Crestor 20 mg daily  -Carotid duplex last year without stenosis, will check a CTA of the neck in [place]     3.  Aortic insufficiency   -Echo September 2019 EF 60%, mild to moderate aortic regurgitation  -Continue to monitor     4.  Visual disturbances  -Has followed up with ophthalmology without any abnormalities reported per patient  -Continue to adjust blood pressure medications for optimal control  -Rechecking CT head and neck  -?  Still curious whether Plaquenil could be playing a part in her complaints       Follow-up with a tele-visit in 6 weeks      Angelo Louie MD

## 2020-08-18 ENCOUNTER — OFFICE VISIT (OUTPATIENT)
Dept: CARDIOLOGY | Facility: CLINIC | Age: 80
End: 2020-08-18

## 2020-08-18 VITALS
HEIGHT: 66 IN | BODY MASS INDEX: 22.02 KG/M2 | HEART RATE: 55 BPM | OXYGEN SATURATION: 99 % | DIASTOLIC BLOOD PRESSURE: 60 MMHG | WEIGHT: 137 LBS | TEMPERATURE: 98 F | SYSTOLIC BLOOD PRESSURE: 164 MMHG

## 2020-08-18 DIAGNOSIS — G45.9 TIA (TRANSIENT ISCHEMIC ATTACK): Primary | ICD-10-CM

## 2020-08-18 PROCEDURE — 99213 OFFICE O/P EST LOW 20 MIN: CPT | Performed by: INTERNAL MEDICINE

## 2020-08-18 RX ORDER — CLONIDINE HYDROCHLORIDE 0.2 MG/1
0.2 TABLET ORAL 2 TIMES DAILY
Qty: 60 TABLET | Refills: 6 | Status: ON HOLD | OUTPATIENT
Start: 2020-08-18 | End: 2022-05-09

## 2020-08-31 ENCOUNTER — HOSPITAL ENCOUNTER (OUTPATIENT)
Dept: CT IMAGING | Facility: HOSPITAL | Age: 80
Discharge: HOME | End: 2020-08-31
Admitting: INTERNAL MEDICINE

## 2020-08-31 DIAGNOSIS — G45.9 TIA (TRANSIENT ISCHEMIC ATTACK): ICD-10-CM

## 2020-08-31 LAB — CREAT BLDA-MCNC: 1.1 MG/DL (ref 0.6–1.3)

## 2020-08-31 PROCEDURE — 70498 CT ANGIOGRAPHY NECK: CPT | Performed by: RADIOLOGY

## 2020-08-31 PROCEDURE — 82565 ASSAY OF CREATININE: CPT

## 2020-08-31 PROCEDURE — 0 IOVERSOL 68 % SOLUTION: Performed by: INTERNAL MEDICINE

## 2020-08-31 PROCEDURE — 70498 CT ANGIOGRAPHY NECK: CPT

## 2020-08-31 RX ADMIN — IOVERSOL 60 ML: 678 INJECTION INTRA-ARTERIAL; INTRAVENOUS at 10:52

## 2020-09-04 ENCOUNTER — TELEPHONE (OUTPATIENT)
Dept: CARDIOLOGY | Facility: CLINIC | Age: 80
End: 2020-09-04

## 2020-09-04 NOTE — TELEPHONE ENCOUNTER
Called patient to let them know results and recommendations per NSK (see NSK note).    Left voicemail

## 2020-09-04 NOTE — TELEPHONE ENCOUNTER
----- Message from Angelo Louie MD sent at 8/31/2020 11:33 AM EDT -----  Can we let this patient know that the angiogram of her neck showed no carotid artery plaque or blockages.  Overall good news

## 2020-09-16 ENCOUNTER — TELEPHONE (OUTPATIENT)
Dept: CARDIOLOGY | Facility: CLINIC | Age: 80
End: 2020-09-16

## 2020-09-16 RX ORDER — SPIRONOLACTONE 25 MG/1
25 TABLET ORAL DAILY
Qty: 30 TABLET | Refills: 11 | Status: SHIPPED | OUTPATIENT
Start: 2020-09-16 | End: 2021-10-06

## 2020-09-16 NOTE — TELEPHONE ENCOUNTER
Patient called with complaints about blood pressure. She states that despite the previous medications changes her blood pressure is remaining in the 170's-180's SBP.     Please advise

## 2021-01-27 ENCOUNTER — IMMUNIZATION (OUTPATIENT)
Dept: VACCINE CLINIC | Facility: HOSPITAL | Age: 81
End: 2021-01-27

## 2021-01-27 PROCEDURE — 0001A: CPT | Performed by: FAMILY MEDICINE

## 2021-01-27 PROCEDURE — 91300 HC SARSCOV02 VAC 30MCG/0.3ML IM: CPT | Performed by: FAMILY MEDICINE

## 2021-02-17 ENCOUNTER — IMMUNIZATION (OUTPATIENT)
Dept: VACCINE CLINIC | Facility: HOSPITAL | Age: 81
End: 2021-02-17

## 2021-02-17 PROCEDURE — 91300 HC SARSCOV02 VAC 30MCG/0.3ML IM: CPT | Performed by: INTERNAL MEDICINE

## 2021-02-17 PROCEDURE — 0002A: CPT | Performed by: INTERNAL MEDICINE

## 2021-03-31 ENCOUNTER — HOSPITAL ENCOUNTER (OUTPATIENT)
Dept: MAMMOGRAPHY | Facility: HOSPITAL | Age: 81
Discharge: HOME | End: 2021-03-31
Admitting: INTERNAL MEDICINE

## 2021-03-31 DIAGNOSIS — Z12.31 VISIT FOR SCREENING MAMMOGRAM: ICD-10-CM

## 2021-03-31 PROCEDURE — 77067 SCR MAMMO BI INCL CAD: CPT | Performed by: RADIOLOGY

## 2021-03-31 PROCEDURE — 77067 SCR MAMMO BI INCL CAD: CPT

## 2021-03-31 PROCEDURE — 77063 BREAST TOMOSYNTHESIS BI: CPT | Performed by: RADIOLOGY

## 2021-03-31 PROCEDURE — 77063 BREAST TOMOSYNTHESIS BI: CPT

## 2021-05-10 RX ORDER — CARVEDILOL 6.25 MG/1
6.25 TABLET ORAL 2 TIMES DAILY WITH MEALS
Qty: 60 TABLET | Refills: 3 | Status: ON HOLD | OUTPATIENT
Start: 2021-05-10 | End: 2022-05-09 | Stop reason: DRUGHIGH

## 2021-05-10 NOTE — TELEPHONE ENCOUNTER
Medication Refill Request    Medication: carvedilol (COREG) 6.25 mg BID   Pertinent Labs:  Lab Results   Component Value Date    GLUCOSE 85 09/25/2019    BUN 31 (H) 09/25/2019    CREATININE 1.10 08/31/2020    EGFRIFNONA 54 (L) 09/25/2019    BCR 31.0 (H) 09/25/2019    K 4.8 09/25/2019    CO2 30.0 (H) 09/25/2019    CALCIUM 9.7 09/25/2019    ALBUMIN 4.00 02/11/2019    ALKPHOS 78 02/11/2019    AST 34 (H) 02/11/2019    ALT 25 02/11/2019      No results found for: CHOL, CHLPL, TRIG, HDL, LDL, LDLDIRECT  Lab Results   Component Value Date    HGBA1C 5.60 02/28/2018     Lab Results   Component Value Date    WBC 4.76 02/11/2019    HGB 12.1 02/11/2019    HCT 37.5 02/11/2019    MCV 92.6 02/11/2019     02/11/2019     No results found for: TSH

## 2021-05-27 ENCOUNTER — TRANSCRIBE ORDERS (OUTPATIENT)
Dept: ADMINISTRATIVE | Facility: HOSPITAL | Age: 81
End: 2021-05-27

## 2021-05-27 DIAGNOSIS — I35.1 NONRHEUMATIC AORTIC VALVE INSUFFICIENCY: Primary | ICD-10-CM

## 2021-06-09 ENCOUNTER — HOSPITAL ENCOUNTER (OUTPATIENT)
Dept: CARDIOLOGY | Facility: HOSPITAL | Age: 81
Discharge: HOME | End: 2021-06-09
Admitting: INTERNAL MEDICINE

## 2021-06-09 DIAGNOSIS — I35.1 NONRHEUMATIC AORTIC VALVE INSUFFICIENCY: ICD-10-CM

## 2021-06-09 LAB
BH CV ECHO MEAS - % IVS THICK: 17.6 %
BH CV ECHO MEAS - % LVPW THICK: 30.1 %
BH CV ECHO MEAS - ACS: 2.1 CM
BH CV ECHO MEAS - AI DEC SLOPE: 266 CM/SEC^2
BH CV ECHO MEAS - AI MAX PG: 65.9 MMHG
BH CV ECHO MEAS - AI MAX VEL: 406 CM/SEC
BH CV ECHO MEAS - AI P1/2T: 447 MSEC
BH CV ECHO MEAS - AO MAX PG: 9.1 MMHG
BH CV ECHO MEAS - AO MEAN PG: 4 MMHG
BH CV ECHO MEAS - AO ROOT AREA (BSA CORRECTED): 1.8
BH CV ECHO MEAS - AO ROOT AREA: 6.8 CM^2
BH CV ECHO MEAS - AO ROOT DIAM: 3 CM
BH CV ECHO MEAS - AO V2 MAX: 151 CM/SEC
BH CV ECHO MEAS - AO V2 MEAN: 96.3 CM/SEC
BH CV ECHO MEAS - AO V2 VTI: 31.8 CM
BH CV ECHO MEAS - BSA(HAYCOCK): 1.7 M^2
BH CV ECHO MEAS - BSA: 1.7 M^2
BH CV ECHO MEAS - BZI_BMI: 22.8 KILOGRAMS/M^2
BH CV ECHO MEAS - BZI_METRIC_HEIGHT: 165.1 CM
BH CV ECHO MEAS - BZI_METRIC_WEIGHT: 62.1 KG
BH CV ECHO MEAS - EDV(CUBED): 91.7 ML
BH CV ECHO MEAS - EDV(MOD-SP4): 47 ML
BH CV ECHO MEAS - EDV(TEICH): 92.9 ML
BH CV ECHO MEAS - EF(CUBED): 71.2 %
BH CV ECHO MEAS - EF(MOD-SP4): 58.9 %
BH CV ECHO MEAS - EF(TEICH): 62.9 %
BH CV ECHO MEAS - ESV(CUBED): 26.5 ML
BH CV ECHO MEAS - ESV(MOD-SP4): 19.3 ML
BH CV ECHO MEAS - ESV(TEICH): 34.4 ML
BH CV ECHO MEAS - FS: 33.9 %
BH CV ECHO MEAS - IVS/LVPW: 1
BH CV ECHO MEAS - IVSD: 1.1 CM
BH CV ECHO MEAS - IVSS: 1.3 CM
BH CV ECHO MEAS - LA DIMENSION: 3.9 CM
BH CV ECHO MEAS - LA/AO: 1.3
BH CV ECHO MEAS - LV DIASTOLIC VOL/BSA (35-75): 27.9 ML/M^2
BH CV ECHO MEAS - LV MASS(C)D: 165.7 GRAMS
BH CV ECHO MEAS - LV MASS(C)DI: 98.4 GRAMS/M^2
BH CV ECHO MEAS - LV MASS(C)S: 123.9 GRAMS
BH CV ECHO MEAS - LV MASS(C)SI: 73.6 GRAMS/M^2
BH CV ECHO MEAS - LV SYSTOLIC VOL/BSA (12-30): 11.5 ML/M^2
BH CV ECHO MEAS - LVIDD: 4.5 CM
BH CV ECHO MEAS - LVIDS: 3 CM
BH CV ECHO MEAS - LVLD AP4: 6.6 CM
BH CV ECHO MEAS - LVLS AP4: 5.4 CM
BH CV ECHO MEAS - LVOT AREA (M): 2.8 CM^2
BH CV ECHO MEAS - LVOT AREA: 2.8 CM^2
BH CV ECHO MEAS - LVOT DIAM: 1.9 CM
BH CV ECHO MEAS - LVPWD: 1 CM
BH CV ECHO MEAS - LVPWS: 1.3 CM
BH CV ECHO MEAS - MV A MAX VEL: 62.4 CM/SEC
BH CV ECHO MEAS - MV E MAX VEL: 96.1 CM/SEC
BH CV ECHO MEAS - MV E/A: 1.5
BH CV ECHO MEAS - PA ACC TIME: 0.14 SEC
BH CV ECHO MEAS - PA PR(ACCEL): 14.2 MMHG
BH CV ECHO MEAS - RAP SYSTOLE: 10 MMHG
BH CV ECHO MEAS - RVSP: 45 MMHG
BH CV ECHO MEAS - SI(AO): 129 ML/M^2
BH CV ECHO MEAS - SI(CUBED): 38.8 ML/M^2
BH CV ECHO MEAS - SI(MOD-SP4): 16.4 ML/M^2
BH CV ECHO MEAS - SI(TEICH): 34.7 ML/M^2
BH CV ECHO MEAS - SV(AO): 217.4 ML
BH CV ECHO MEAS - SV(CUBED): 65.3 ML
BH CV ECHO MEAS - SV(MOD-SP4): 27.7 ML
BH CV ECHO MEAS - SV(TEICH): 58.5 ML
BH CV ECHO MEAS - TR MAX VEL: 284 CM/SEC
MAXIMAL PREDICTED HEART RATE: 140 BPM
STRESS TARGET HR: 119 BPM

## 2021-06-09 PROCEDURE — 93306 TTE W/DOPPLER COMPLETE: CPT

## 2021-06-09 PROCEDURE — 93306 TTE W/DOPPLER COMPLETE: CPT | Performed by: INTERNAL MEDICINE

## 2021-10-06 RX ORDER — SPIRONOLACTONE 25 MG/1
TABLET ORAL
Qty: 30 TABLET | Refills: 11 | Status: ON HOLD | OUTPATIENT
Start: 2021-10-06 | End: 2022-05-09 | Stop reason: DRUGHIGH

## 2021-11-22 ENCOUNTER — HOSPITAL ENCOUNTER (OUTPATIENT)
Dept: BONE DENSITY | Facility: HOSPITAL | Age: 81
Discharge: HOME | End: 2021-11-22
Admitting: INTERNAL MEDICINE

## 2021-11-22 DIAGNOSIS — Z78.0 ASYMPTOMATIC MENOPAUSAL STATE: ICD-10-CM

## 2021-11-22 PROCEDURE — 77080 DXA BONE DENSITY AXIAL: CPT

## 2021-11-22 PROCEDURE — 77080 DXA BONE DENSITY AXIAL: CPT | Performed by: RADIOLOGY

## 2021-12-22 ENCOUNTER — TRANSCRIBE ORDERS (OUTPATIENT)
Dept: ADMINISTRATIVE | Facility: HOSPITAL | Age: 81
End: 2021-12-22

## 2021-12-22 DIAGNOSIS — Z11.52 ENCOUNTER FOR SCREENING FOR COVID-19: Primary | ICD-10-CM

## 2021-12-22 PROBLEM — U07.1 COVID-19: Status: ACTIVE | Noted: 2021-12-22

## 2021-12-22 RX ORDER — METHYLPREDNISOLONE SODIUM SUCCINATE 125 MG/2ML
125 INJECTION, POWDER, LYOPHILIZED, FOR SOLUTION INTRAMUSCULAR; INTRAVENOUS AS NEEDED
Status: CANCELLED | OUTPATIENT
Start: 2021-12-23

## 2021-12-22 RX ORDER — DIPHENHYDRAMINE HCL 50 MG
50 CAPSULE ORAL ONCE AS NEEDED
Status: CANCELLED | OUTPATIENT
Start: 2021-12-23

## 2021-12-22 RX ORDER — EPINEPHRINE 1 MG/ML
0.3 INJECTION, SOLUTION INTRAMUSCULAR; SUBCUTANEOUS AS NEEDED
Status: CANCELLED | OUTPATIENT
Start: 2021-12-23

## 2021-12-22 RX ORDER — DIPHENHYDRAMINE HYDROCHLORIDE 50 MG/ML
50 INJECTION INTRAMUSCULAR; INTRAVENOUS ONCE AS NEEDED
Status: CANCELLED | OUTPATIENT
Start: 2021-12-23

## 2021-12-23 ENCOUNTER — HOSPITAL ENCOUNTER (OUTPATIENT)
Dept: INFUSION THERAPY | Facility: HOSPITAL | Age: 81
Discharge: HOME | End: 2021-12-23
Admitting: NURSE PRACTITIONER

## 2021-12-23 VITALS
HEART RATE: 70 BPM | DIASTOLIC BLOOD PRESSURE: 63 MMHG | RESPIRATION RATE: 18 BRPM | TEMPERATURE: 98.8 F | OXYGEN SATURATION: 96 % | SYSTOLIC BLOOD PRESSURE: 148 MMHG

## 2021-12-23 DIAGNOSIS — U07.1 COVID-19: Primary | ICD-10-CM

## 2021-12-23 PROCEDURE — 25010000002 INJECTION, BAMLANIVIMAB AND ETESEVIMAB, 2100 MG: Performed by: NURSE PRACTITIONER

## 2021-12-23 PROCEDURE — M0245 HC IV INFUSION, BAMLANIVIMAB AND ETESEVIMAB, 2100 MG: HCPCS | Performed by: NURSE PRACTITIONER

## 2021-12-23 RX ORDER — EPINEPHRINE 1 MG/ML
0.3 INJECTION, SOLUTION INTRAMUSCULAR; SUBCUTANEOUS AS NEEDED
Status: DISCONTINUED | OUTPATIENT
Start: 2021-12-23 | End: 2021-12-25 | Stop reason: HOSPADM

## 2021-12-23 RX ORDER — METHYLPREDNISOLONE SODIUM SUCCINATE 125 MG/2ML
125 INJECTION, POWDER, LYOPHILIZED, FOR SOLUTION INTRAMUSCULAR; INTRAVENOUS AS NEEDED
OUTPATIENT
Start: 2021-12-23

## 2021-12-23 RX ORDER — DIPHENHYDRAMINE HYDROCHLORIDE 50 MG/ML
50 INJECTION INTRAMUSCULAR; INTRAVENOUS ONCE AS NEEDED
Status: DISCONTINUED | OUTPATIENT
Start: 2021-12-23 | End: 2021-12-25 | Stop reason: HOSPADM

## 2021-12-23 RX ORDER — EPINEPHRINE 1 MG/ML
0.3 INJECTION, SOLUTION INTRAMUSCULAR; SUBCUTANEOUS AS NEEDED
OUTPATIENT
Start: 2021-12-23

## 2021-12-23 RX ORDER — DIPHENHYDRAMINE HCL 50 MG
50 CAPSULE ORAL ONCE AS NEEDED
Status: DISCONTINUED | OUTPATIENT
Start: 2021-12-23 | End: 2021-12-25 | Stop reason: HOSPADM

## 2021-12-23 RX ORDER — DIPHENHYDRAMINE HCL 50 MG
50 CAPSULE ORAL ONCE AS NEEDED
OUTPATIENT
Start: 2021-12-23

## 2021-12-23 RX ORDER — DIPHENHYDRAMINE HYDROCHLORIDE 50 MG/ML
50 INJECTION INTRAMUSCULAR; INTRAVENOUS ONCE AS NEEDED
OUTPATIENT
Start: 2021-12-23

## 2021-12-23 RX ORDER — METHYLPREDNISOLONE SODIUM SUCCINATE 125 MG/2ML
125 INJECTION, POWDER, LYOPHILIZED, FOR SOLUTION INTRAMUSCULAR; INTRAVENOUS AS NEEDED
Status: DISCONTINUED | OUTPATIENT
Start: 2021-12-23 | End: 2021-12-25 | Stop reason: HOSPADM

## 2021-12-23 RX ADMIN — SODIUM CHLORIDE: 9 INJECTION, SOLUTION INTRAVENOUS at 09:08

## 2022-01-04 ENCOUNTER — TRANSCRIBE ORDERS (OUTPATIENT)
Dept: LAB | Facility: HOSPITAL | Age: 82
End: 2022-01-04

## 2022-01-04 DIAGNOSIS — Z01.818 OTHER SPECIFIED PRE-OPERATIVE EXAMINATION: Primary | ICD-10-CM

## 2022-02-21 ENCOUNTER — LAB (OUTPATIENT)
Dept: LAB | Facility: HOSPITAL | Age: 82
End: 2022-02-21

## 2022-02-21 DIAGNOSIS — Z01.818 OTHER SPECIFIED PRE-OPERATIVE EXAMINATION: ICD-10-CM

## 2022-05-03 ENCOUNTER — TRANSCRIBE ORDERS (OUTPATIENT)
Dept: ADMINISTRATIVE | Facility: HOSPITAL | Age: 82
End: 2022-05-03

## 2022-05-03 DIAGNOSIS — J84.9 INTERSTITIAL LUNG DISEASE: Primary | ICD-10-CM

## 2022-05-09 ENCOUNTER — HOSPITAL ENCOUNTER (INPATIENT)
Facility: HOSPITAL | Age: 82
LOS: 2 days | Discharge: HOME | End: 2022-05-11
Attending: STUDENT IN AN ORGANIZED HEALTH CARE EDUCATION/TRAINING PROGRAM | Admitting: HOSPITALIST

## 2022-05-09 ENCOUNTER — APPOINTMENT (OUTPATIENT)
Dept: GENERAL RADIOLOGY | Facility: HOSPITAL | Age: 82
End: 2022-05-09

## 2022-05-09 DIAGNOSIS — I48.92 ATRIAL FLUTTER, UNSPECIFIED TYPE: Primary | ICD-10-CM

## 2022-05-09 DIAGNOSIS — I48.92 ATRIAL FLUTTER WITH RAPID VENTRICULAR RESPONSE: ICD-10-CM

## 2022-05-09 LAB
ALBUMIN SERPL-MCNC: 4.32 G/DL (ref 3.5–5.2)
ALBUMIN/GLOB SERPL: 1.3 G/DL
ALP SERPL-CCNC: 75 U/L (ref 39–117)
ALT SERPL W P-5'-P-CCNC: 22 U/L (ref 1–33)
ANION GAP SERPL CALCULATED.3IONS-SCNC: 12.1 MMOL/L (ref 5–15)
APTT PPP: 36.3 SECONDS (ref 26.5–34.5)
AST SERPL-CCNC: 43 U/L (ref 1–32)
BASOPHILS # BLD AUTO: 0.05 10*3/MM3 (ref 0–0.2)
BASOPHILS NFR BLD AUTO: 0.8 % (ref 0–1.5)
BILIRUB SERPL-MCNC: 0.5 MG/DL (ref 0–1.2)
BILIRUB UR QL STRIP: NEGATIVE
BUN SERPL-MCNC: 26 MG/DL (ref 8–23)
BUN/CREAT SERPL: 18.6 (ref 7–25)
CALCIUM SPEC-SCNC: 9.5 MG/DL (ref 8.6–10.5)
CHLORIDE SERPL-SCNC: 99 MMOL/L (ref 98–107)
CLARITY UR: CLEAR
CO2 SERPL-SCNC: 25.9 MMOL/L (ref 22–29)
COLOR UR: YELLOW
CREAT SERPL-MCNC: 1.4 MG/DL (ref 0.57–1)
DEPRECATED RDW RBC AUTO: 43.5 FL (ref 37–54)
EGFRCR SERPLBLD CKD-EPI 2021: 37.9 ML/MIN/1.73
EOSINOPHIL # BLD AUTO: 0.26 10*3/MM3 (ref 0–0.4)
EOSINOPHIL NFR BLD AUTO: 3.9 % (ref 0.3–6.2)
ERYTHROCYTE [DISTWIDTH] IN BLOOD BY AUTOMATED COUNT: 13.2 % (ref 12.3–15.4)
FLUAV RNA RESP QL NAA+PROBE: NOT DETECTED
FLUBV RNA RESP QL NAA+PROBE: NOT DETECTED
GLOBULIN UR ELPH-MCNC: 3.3 GM/DL
GLUCOSE SERPL-MCNC: 107 MG/DL (ref 65–99)
GLUCOSE UR STRIP-MCNC: NEGATIVE MG/DL
HBA1C MFR BLD: 5.9 % (ref 4.8–5.6)
HCT VFR BLD AUTO: 38.2 % (ref 34–46.6)
HGB BLD-MCNC: 12.4 G/DL (ref 12–15.9)
HGB UR QL STRIP.AUTO: NEGATIVE
HOLD SPECIMEN: NORMAL
HOLD SPECIMEN: NORMAL
IMM GRANULOCYTES # BLD AUTO: 0.02 10*3/MM3 (ref 0–0.05)
IMM GRANULOCYTES NFR BLD AUTO: 0.3 % (ref 0–0.5)
INR PPP: 0.96 (ref 0.9–1.1)
KETONES UR QL STRIP: NEGATIVE
LEUKOCYTE ESTERASE UR QL STRIP.AUTO: NEGATIVE
LYMPHOCYTES # BLD AUTO: 1.15 10*3/MM3 (ref 0.7–3.1)
LYMPHOCYTES NFR BLD AUTO: 17.4 % (ref 19.6–45.3)
MAGNESIUM SERPL-MCNC: 1.6 MG/DL (ref 1.6–2.4)
MCH RBC QN AUTO: 29.3 PG (ref 26.6–33)
MCHC RBC AUTO-ENTMCNC: 32.5 G/DL (ref 31.5–35.7)
MCV RBC AUTO: 90.3 FL (ref 79–97)
MONOCYTES # BLD AUTO: 0.75 10*3/MM3 (ref 0.1–0.9)
MONOCYTES NFR BLD AUTO: 11.3 % (ref 5–12)
NEUTROPHILS NFR BLD AUTO: 4.39 10*3/MM3 (ref 1.7–7)
NEUTROPHILS NFR BLD AUTO: 66.3 % (ref 42.7–76)
NITRITE UR QL STRIP: NEGATIVE
NRBC BLD AUTO-RTO: 0 /100 WBC (ref 0–0.2)
NT-PROBNP SERPL-MCNC: 3075 PG/ML (ref 0–1800)
PH UR STRIP.AUTO: 6.5 [PH] (ref 5–8)
PLATELET # BLD AUTO: 184 10*3/MM3 (ref 140–450)
PMV BLD AUTO: 10 FL (ref 6–12)
POTASSIUM SERPL-SCNC: 4.4 MMOL/L (ref 3.5–5.2)
PROT SERPL-MCNC: 7.6 G/DL (ref 6–8.5)
PROT UR QL STRIP: NEGATIVE
PROTHROMBIN TIME: 13 SECONDS (ref 12.1–14.7)
QT INTERVAL: 292 MS
QTC INTERVAL: 432 MS
RBC # BLD AUTO: 4.23 10*6/MM3 (ref 3.77–5.28)
SARS-COV-2 RNA RESP QL NAA+PROBE: NOT DETECTED
SODIUM SERPL-SCNC: 137 MMOL/L (ref 136–145)
SP GR UR STRIP: 1.01 (ref 1–1.03)
TROPONIN T SERPL-MCNC: <0.01 NG/ML (ref 0–0.03)
TROPONIN T SERPL-MCNC: <0.01 NG/ML (ref 0–0.03)
TSH SERPL DL<=0.05 MIU/L-ACNC: 2 UIU/ML (ref 0.27–4.2)
UROBILINOGEN UR QL STRIP: NORMAL
WBC NRBC COR # BLD: 6.62 10*3/MM3 (ref 3.4–10.8)
WHOLE BLOOD HOLD SPECIMEN: NORMAL

## 2022-05-09 PROCEDURE — 84484 ASSAY OF TROPONIN QUANT: CPT | Performed by: PHYSICIAN ASSISTANT

## 2022-05-09 PROCEDURE — 85610 PROTHROMBIN TIME: CPT | Performed by: PHYSICIAN ASSISTANT

## 2022-05-09 PROCEDURE — 25010000002 HEPARIN (PORCINE) PER 1000 UNITS: Performed by: PHYSICIAN ASSISTANT

## 2022-05-09 PROCEDURE — 84443 ASSAY THYROID STIM HORMONE: CPT | Performed by: HOSPITALIST

## 2022-05-09 PROCEDURE — 81003 URINALYSIS AUTO W/O SCOPE: CPT | Performed by: PHYSICIAN ASSISTANT

## 2022-05-09 PROCEDURE — 83880 ASSAY OF NATRIURETIC PEPTIDE: CPT | Performed by: HOSPITALIST

## 2022-05-09 PROCEDURE — 25010000002 MAGNESIUM SULFATE 2 GM/50ML SOLUTION: Performed by: HOSPITALIST

## 2022-05-09 PROCEDURE — 71045 X-RAY EXAM CHEST 1 VIEW: CPT

## 2022-05-09 PROCEDURE — 99285 EMERGENCY DEPT VISIT HI MDM: CPT

## 2022-05-09 PROCEDURE — 99223 1ST HOSP IP/OBS HIGH 75: CPT | Performed by: HOSPITALIST

## 2022-05-09 PROCEDURE — 87636 SARSCOV2 & INF A&B AMP PRB: CPT | Performed by: PHYSICIAN ASSISTANT

## 2022-05-09 PROCEDURE — 83036 HEMOGLOBIN GLYCOSYLATED A1C: CPT | Performed by: HOSPITALIST

## 2022-05-09 PROCEDURE — 25010000002 DIPHENHYDRAMINE PER 50 MG: Performed by: PHYSICIAN ASSISTANT

## 2022-05-09 PROCEDURE — 85025 COMPLETE CBC W/AUTO DIFF WBC: CPT | Performed by: PHYSICIAN ASSISTANT

## 2022-05-09 PROCEDURE — 83735 ASSAY OF MAGNESIUM: CPT | Performed by: PHYSICIAN ASSISTANT

## 2022-05-09 PROCEDURE — 71045 X-RAY EXAM CHEST 1 VIEW: CPT | Performed by: RADIOLOGY

## 2022-05-09 PROCEDURE — 93005 ELECTROCARDIOGRAM TRACING: CPT | Performed by: PHYSICIAN ASSISTANT

## 2022-05-09 PROCEDURE — 85730 THROMBOPLASTIN TIME PARTIAL: CPT | Performed by: PHYSICIAN ASSISTANT

## 2022-05-09 PROCEDURE — 80053 COMPREHEN METABOLIC PANEL: CPT | Performed by: PHYSICIAN ASSISTANT

## 2022-05-09 RX ORDER — MAGNESIUM SULFATE 1 G/100ML
1 INJECTION INTRAVENOUS AS NEEDED
Status: DISCONTINUED | OUTPATIENT
Start: 2022-05-09 | End: 2022-05-11 | Stop reason: HOSPADM

## 2022-05-09 RX ORDER — HYDROXYCHLOROQUINE SULFATE 200 MG/1
200 TABLET, FILM COATED ORAL 2 TIMES DAILY
Status: DISCONTINUED | OUTPATIENT
Start: 2022-05-09 | End: 2022-05-09

## 2022-05-09 RX ORDER — HEPARIN SODIUM 5000 [USP'U]/ML
60 INJECTION, SOLUTION INTRAVENOUS; SUBCUTANEOUS ONCE
Status: COMPLETED | OUTPATIENT
Start: 2022-05-09 | End: 2022-05-09

## 2022-05-09 RX ORDER — CETIRIZINE HYDROCHLORIDE 10 MG/1
10 TABLET ORAL DAILY
Status: CANCELLED | OUTPATIENT
Start: 2022-05-10

## 2022-05-09 RX ORDER — CARVEDILOL 6.25 MG/1
12.5 TABLET ORAL 2 TIMES DAILY WITH MEALS
Status: DISCONTINUED | OUTPATIENT
Start: 2022-05-09 | End: 2022-05-10

## 2022-05-09 RX ORDER — ASPIRIN 81 MG/1
324 TABLET, CHEWABLE ORAL ONCE
Status: COMPLETED | OUTPATIENT
Start: 2022-05-09 | End: 2022-05-09

## 2022-05-09 RX ORDER — CETIRIZINE HYDROCHLORIDE 10 MG/1
5 TABLET ORAL DAILY
Status: DISCONTINUED | OUTPATIENT
Start: 2022-05-10 | End: 2022-05-11 | Stop reason: HOSPADM

## 2022-05-09 RX ORDER — NITROGLYCERIN 0.4 MG/1
0.4 TABLET SUBLINGUAL
Status: DISCONTINUED | OUTPATIENT
Start: 2022-05-09 | End: 2022-05-11 | Stop reason: HOSPADM

## 2022-05-09 RX ORDER — CARVEDILOL 25 MG/1
25 TABLET ORAL 2 TIMES DAILY WITH MEALS
Status: CANCELLED | OUTPATIENT
Start: 2022-05-09

## 2022-05-09 RX ORDER — TRAMADOL HYDROCHLORIDE 50 MG/1
25 TABLET ORAL EVERY 6 HOURS PRN
Status: DISCONTINUED | OUTPATIENT
Start: 2022-05-09 | End: 2022-05-11 | Stop reason: HOSPADM

## 2022-05-09 RX ORDER — SPIRONOLACTONE 25 MG/1
25 TABLET ORAL DAILY
Status: CANCELLED | OUTPATIENT
Start: 2022-05-10

## 2022-05-09 RX ORDER — MAGNESIUM SULFATE HEPTAHYDRATE 40 MG/ML
2 INJECTION, SOLUTION INTRAVENOUS ONCE
Status: COMPLETED | OUTPATIENT
Start: 2022-05-09 | End: 2022-05-10

## 2022-05-09 RX ORDER — PANTOPRAZOLE SODIUM 40 MG/1
40 TABLET, DELAYED RELEASE ORAL
Status: CANCELLED | OUTPATIENT
Start: 2022-05-10

## 2022-05-09 RX ORDER — HYDROXYCHLOROQUINE SULFATE 200 MG/1
200 TABLET, FILM COATED ORAL 2 TIMES DAILY
Status: CANCELLED | OUTPATIENT
Start: 2022-05-09

## 2022-05-09 RX ORDER — SPIRONOLACTONE 25 MG/1
25 TABLET ORAL DAILY
COMMUNITY

## 2022-05-09 RX ORDER — SODIUM CHLORIDE 9 MG/ML
75 INJECTION, SOLUTION INTRAVENOUS CONTINUOUS
Status: DISCONTINUED | OUTPATIENT
Start: 2022-05-09 | End: 2022-05-09

## 2022-05-09 RX ORDER — HEPARIN SOD,PORCINE/0.9 % NACL 25000/250
12 INTRAVENOUS SOLUTION INTRAVENOUS
Status: DISCONTINUED | OUTPATIENT
Start: 2022-05-09 | End: 2022-05-11

## 2022-05-09 RX ORDER — ESCITALOPRAM OXALATE 10 MG/1
10 TABLET ORAL NIGHTLY
Status: DISCONTINUED | OUTPATIENT
Start: 2022-05-09 | End: 2022-05-11 | Stop reason: HOSPADM

## 2022-05-09 RX ORDER — SODIUM CHLORIDE 0.9 % (FLUSH) 0.9 %
10 SYRINGE (ML) INJECTION EVERY 12 HOURS SCHEDULED
Status: DISCONTINUED | OUTPATIENT
Start: 2022-05-09 | End: 2022-05-11 | Stop reason: HOSPADM

## 2022-05-09 RX ORDER — HYDROXYCHLOROQUINE SULFATE 200 MG/1
100 TABLET, FILM COATED ORAL 2 TIMES DAILY
Status: DISCONTINUED | OUTPATIENT
Start: 2022-05-09 | End: 2022-05-11 | Stop reason: HOSPADM

## 2022-05-09 RX ORDER — MAGNESIUM SULFATE HEPTAHYDRATE 40 MG/ML
4 INJECTION, SOLUTION INTRAVENOUS AS NEEDED
Status: DISCONTINUED | OUTPATIENT
Start: 2022-05-09 | End: 2022-05-11 | Stop reason: HOSPADM

## 2022-05-09 RX ORDER — TRAMADOL HYDROCHLORIDE 50 MG/1
50 TABLET ORAL EVERY 6 HOURS PRN
Status: CANCELLED | OUTPATIENT
Start: 2022-05-09

## 2022-05-09 RX ORDER — ESCITALOPRAM OXALATE 10 MG/1
20 TABLET ORAL NIGHTLY
Status: CANCELLED | OUTPATIENT
Start: 2022-05-09

## 2022-05-09 RX ORDER — DILTIAZEM HCL IN NACL,ISO-OSM 125 MG/125
5 PLASTIC BAG, INJECTION (ML) INTRAVENOUS CONTINUOUS
Status: DISCONTINUED | OUTPATIENT
Start: 2022-05-09 | End: 2022-05-11

## 2022-05-09 RX ORDER — MAGNESIUM SULFATE HEPTAHYDRATE 40 MG/ML
2 INJECTION, SOLUTION INTRAVENOUS AS NEEDED
Status: DISCONTINUED | OUTPATIENT
Start: 2022-05-09 | End: 2022-05-11 | Stop reason: HOSPADM

## 2022-05-09 RX ORDER — ROSUVASTATIN CALCIUM 20 MG/1
20 TABLET, COATED ORAL NIGHTLY
COMMUNITY

## 2022-05-09 RX ORDER — SODIUM CHLORIDE 0.9 % (FLUSH) 0.9 %
10 SYRINGE (ML) INJECTION AS NEEDED
Status: DISCONTINUED | OUTPATIENT
Start: 2022-05-09 | End: 2022-05-11 | Stop reason: HOSPADM

## 2022-05-09 RX ORDER — FUROSEMIDE 20 MG/1
10 TABLET ORAL NIGHTLY PRN
Status: CANCELLED | OUTPATIENT
Start: 2022-05-09

## 2022-05-09 RX ORDER — ROSUVASTATIN CALCIUM 20 MG/1
20 TABLET, COATED ORAL NIGHTLY
Status: CANCELLED | OUTPATIENT
Start: 2022-05-09

## 2022-05-09 RX ORDER — ACETAMINOPHEN 500 MG
1000 TABLET ORAL ONCE
Status: COMPLETED | OUTPATIENT
Start: 2022-05-09 | End: 2022-05-09

## 2022-05-09 RX ORDER — PANTOPRAZOLE SODIUM 40 MG/1
40 TABLET, DELAYED RELEASE ORAL
Status: DISCONTINUED | OUTPATIENT
Start: 2022-05-10 | End: 2022-05-11 | Stop reason: HOSPADM

## 2022-05-09 RX ORDER — CARVEDILOL 25 MG/1
25 TABLET ORAL 2 TIMES DAILY WITH MEALS
COMMUNITY

## 2022-05-09 RX ORDER — ROSUVASTATIN CALCIUM 20 MG/1
20 TABLET, COATED ORAL NIGHTLY
Status: DISCONTINUED | OUTPATIENT
Start: 2022-05-09 | End: 2022-05-11 | Stop reason: HOSPADM

## 2022-05-09 RX ORDER — DIPHENHYDRAMINE HYDROCHLORIDE 50 MG/ML
25 INJECTION INTRAMUSCULAR; INTRAVENOUS ONCE
Status: COMPLETED | OUTPATIENT
Start: 2022-05-09 | End: 2022-05-09

## 2022-05-09 RX ORDER — MAGNESIUM SULFATE HEPTAHYDRATE 40 MG/ML
2 INJECTION, SOLUTION INTRAVENOUS ONCE
Status: DISCONTINUED | OUTPATIENT
Start: 2022-05-09 | End: 2022-05-09 | Stop reason: SDUPTHER

## 2022-05-09 RX ADMIN — Medication 10 ML: at 22:03

## 2022-05-09 RX ADMIN — Medication 5 MG/HR: at 17:48

## 2022-05-09 RX ADMIN — HEPARIN SODIUM 3900 UNITS: 5000 INJECTION INTRAVENOUS; SUBCUTANEOUS at 17:45

## 2022-05-09 RX ADMIN — MAGNESIUM SULFATE HEPTAHYDRATE 2 G: 40 INJECTION, SOLUTION INTRAVENOUS at 22:03

## 2022-05-09 RX ADMIN — ACETAMINOPHEN 1000 MG: 500 TABLET ORAL at 19:14

## 2022-05-09 RX ADMIN — ASPIRIN 324 MG: 81 TABLET, CHEWABLE ORAL at 16:02

## 2022-05-09 RX ADMIN — SODIUM CHLORIDE 500 ML: 9 INJECTION, SOLUTION INTRAVENOUS at 17:05

## 2022-05-09 RX ADMIN — DIPHENHYDRAMINE HYDROCHLORIDE 25 MG: 50 INJECTION INTRAMUSCULAR; INTRAVENOUS at 17:43

## 2022-05-09 RX ADMIN — HEPARIN SODIUM 12 UNITS/KG/HR: 5000 INJECTION INTRAVENOUS; SUBCUTANEOUS at 17:47

## 2022-05-09 NOTE — ED NOTES
MEDICAL SCREENING:    Reason for Visit: Chest pain    Patient initially seen in triage.  The patient was advised further evaluation and diagnostic testing will be needed, some of the treatment and testing will be initiated in the lobby in order to begin the process.  The patient will be returned to the waiting area for the time being and possibly be re-assessed by a subsequent ED provider.  The patient will be brought back to the treatment area in as timely manner as possible.         Veronique Mayen PA-C  05/09/22 144

## 2022-05-10 ENCOUNTER — APPOINTMENT (OUTPATIENT)
Dept: CARDIOLOGY | Facility: HOSPITAL | Age: 82
End: 2022-05-10

## 2022-05-10 LAB
ALBUMIN SERPL-MCNC: 3.73 G/DL (ref 3.5–5.2)
ALBUMIN/GLOB SERPL: 1.3 G/DL
ALP SERPL-CCNC: 72 U/L (ref 39–117)
ALT SERPL W P-5'-P-CCNC: 20 U/L (ref 1–33)
ANION GAP SERPL CALCULATED.3IONS-SCNC: 12.3 MMOL/L (ref 5–15)
APTT PPP: 55.9 SECONDS (ref 26.5–34.5)
APTT PPP: 61.8 SECONDS (ref 26.5–34.5)
APTT PPP: 68.1 SECONDS (ref 26.5–34.5)
APTT PPP: >100 SECONDS (ref 26.5–34.5)
AST SERPL-CCNC: 39 U/L (ref 1–32)
BASOPHILS # BLD AUTO: 0.03 10*3/MM3 (ref 0–0.2)
BASOPHILS NFR BLD AUTO: 0.5 % (ref 0–1.5)
BH CV ECHO MEAS - ACS: 1.7 CM
BH CV ECHO MEAS - AI P1/2T: 623.3 MSEC
BH CV ECHO MEAS - AO MAX PG: 7.7 MMHG
BH CV ECHO MEAS - AO MEAN PG: 4 MMHG
BH CV ECHO MEAS - AO ROOT DIAM: 3.3 CM
BH CV ECHO MEAS - AO V2 MAX: 139 CM/SEC
BH CV ECHO MEAS - AO V2 VTI: 26.9 CM
BH CV ECHO MEAS - EDV(CUBED): 95.4 ML
BH CV ECHO MEAS - EDV(MOD-SP4): 67.1 ML
BH CV ECHO MEAS - EF(MOD-SP4): 62.4 %
BH CV ECHO MEAS - ESV(CUBED): 26.2 ML
BH CV ECHO MEAS - ESV(MOD-SP4): 25.2 ML
BH CV ECHO MEAS - FS: 35 %
BH CV ECHO MEAS - IVS/LVPW: 1.12 CM
BH CV ECHO MEAS - IVSD: 1.41 CM
BH CV ECHO MEAS - LA DIMENSION: 3.6 CM
BH CV ECHO MEAS - LAT PEAK E' VEL: 10 CM/SEC
BH CV ECHO MEAS - LV DIASTOLIC VOL/BSA (35-75): 38.2 CM2
BH CV ECHO MEAS - LV MASS(C)D: 236.9 GRAMS
BH CV ECHO MEAS - LV SYSTOLIC VOL/BSA (12-30): 14.4 CM2
BH CV ECHO MEAS - LVIDD: 4.6 CM
BH CV ECHO MEAS - LVIDS: 3 CM
BH CV ECHO MEAS - LVOT AREA: 2.8 CM2
BH CV ECHO MEAS - LVOT DIAM: 1.9 CM
BH CV ECHO MEAS - LVPWD: 1.26 CM
BH CV ECHO MEAS - MED PEAK E' VEL: 7.7 CM/SEC
BH CV ECHO MEAS - MV A MAX VEL: 39.8 CM/SEC
BH CV ECHO MEAS - MV E MAX VEL: 92.1 CM/SEC
BH CV ECHO MEAS - MV E/A: 2.31
BH CV ECHO MEAS - PA ACC TIME: 0.11 SEC
BH CV ECHO MEAS - PA PR(ACCEL): 31.3 MMHG
BH CV ECHO MEAS - RAP SYSTOLE: 10 MMHG
BH CV ECHO MEAS - RVSP: 38.5 MMHG
BH CV ECHO MEAS - SI(MOD-SP4): 23.9 ML/M2
BH CV ECHO MEAS - SV(MOD-SP4): 41.9 ML
BH CV ECHO MEAS - TAPSE (>1.6): 2.13 CM
BH CV ECHO MEAS - TR MAX PG: 28.5 MMHG
BH CV ECHO MEAS - TR MAX VEL: 267 CM/SEC
BH CV ECHO MEASUREMENTS AVERAGE E/E' RATIO: 10.41
BILIRUB SERPL-MCNC: 0.5 MG/DL (ref 0–1.2)
BUN SERPL-MCNC: 25 MG/DL (ref 8–23)
BUN/CREAT SERPL: 18.4 (ref 7–25)
CALCIUM SPEC-SCNC: 8.9 MG/DL (ref 8.6–10.5)
CHLORIDE SERPL-SCNC: 101 MMOL/L (ref 98–107)
CHOLEST SERPL-MCNC: 106 MG/DL (ref 0–200)
CO2 SERPL-SCNC: 23.7 MMOL/L (ref 22–29)
CREAT SERPL-MCNC: 1.36 MG/DL (ref 0.57–1)
DEPRECATED RDW RBC AUTO: 43.3 FL (ref 37–54)
EGFRCR SERPLBLD CKD-EPI 2021: 39.2 ML/MIN/1.73
EOSINOPHIL # BLD AUTO: 0.31 10*3/MM3 (ref 0–0.4)
EOSINOPHIL NFR BLD AUTO: 5.2 % (ref 0.3–6.2)
ERYTHROCYTE [DISTWIDTH] IN BLOOD BY AUTOMATED COUNT: 13.1 % (ref 12.3–15.4)
GLOBULIN UR ELPH-MCNC: 3 GM/DL
GLUCOSE SERPL-MCNC: 124 MG/DL (ref 65–99)
HCT VFR BLD AUTO: 35.8 % (ref 34–46.6)
HDLC SERPL-MCNC: 48 MG/DL (ref 40–60)
HGB BLD-MCNC: 11.4 G/DL (ref 12–15.9)
IMM GRANULOCYTES # BLD AUTO: 0.01 10*3/MM3 (ref 0–0.05)
IMM GRANULOCYTES NFR BLD AUTO: 0.2 % (ref 0–0.5)
LDLC SERPL CALC-MCNC: 39 MG/DL (ref 0–100)
LDLC/HDLC SERPL: 0.78 {RATIO}
LEFT ATRIUM VOLUME INDEX: 33.5 ML/M2
LYMPHOCYTES # BLD AUTO: 1.96 10*3/MM3 (ref 0.7–3.1)
LYMPHOCYTES NFR BLD AUTO: 32.6 % (ref 19.6–45.3)
MAXIMAL PREDICTED HEART RATE: 139 BPM
MCH RBC QN AUTO: 28.9 PG (ref 26.6–33)
MCHC RBC AUTO-ENTMCNC: 31.8 G/DL (ref 31.5–35.7)
MCV RBC AUTO: 90.9 FL (ref 79–97)
MONOCYTES # BLD AUTO: 0.68 10*3/MM3 (ref 0.1–0.9)
MONOCYTES NFR BLD AUTO: 11.3 % (ref 5–12)
NEUTROPHILS NFR BLD AUTO: 3.02 10*3/MM3 (ref 1.7–7)
NEUTROPHILS NFR BLD AUTO: 50.2 % (ref 42.7–76)
NRBC BLD AUTO-RTO: 0 /100 WBC (ref 0–0.2)
PLATELET # BLD AUTO: 143 10*3/MM3 (ref 140–450)
PMV BLD AUTO: 10.4 FL (ref 6–12)
POTASSIUM SERPL-SCNC: 3.8 MMOL/L (ref 3.5–5.2)
PROT SERPL-MCNC: 6.7 G/DL (ref 6–8.5)
RBC # BLD AUTO: 3.94 10*6/MM3 (ref 3.77–5.28)
SODIUM SERPL-SCNC: 137 MMOL/L (ref 136–145)
STRESS TARGET HR: 118 BPM
TRIGL SERPL-MCNC: 104 MG/DL (ref 0–150)
TROPONIN T SERPL-MCNC: <0.01 NG/ML (ref 0–0.03)
TROPONIN T SERPL-MCNC: <0.01 NG/ML (ref 0–0.03)
VLDLC SERPL-MCNC: 19 MG/DL (ref 5–40)
WBC NRBC COR # BLD: 6.01 10*3/MM3 (ref 3.4–10.8)

## 2022-05-10 PROCEDURE — 85730 THROMBOPLASTIN TIME PARTIAL: CPT | Performed by: HOSPITALIST

## 2022-05-10 PROCEDURE — 99232 SBSQ HOSP IP/OBS MODERATE 35: CPT | Performed by: INTERNAL MEDICINE

## 2022-05-10 PROCEDURE — 80061 LIPID PANEL: CPT | Performed by: HOSPITALIST

## 2022-05-10 PROCEDURE — 80053 COMPREHEN METABOLIC PANEL: CPT | Performed by: HOSPITALIST

## 2022-05-10 PROCEDURE — 94761 N-INVAS EAR/PLS OXIMETRY MLT: CPT

## 2022-05-10 PROCEDURE — 85025 COMPLETE CBC W/AUTO DIFF WBC: CPT | Performed by: HOSPITALIST

## 2022-05-10 PROCEDURE — 85730 THROMBOPLASTIN TIME PARTIAL: CPT | Performed by: INTERNAL MEDICINE

## 2022-05-10 PROCEDURE — 93306 TTE W/DOPPLER COMPLETE: CPT

## 2022-05-10 PROCEDURE — 99222 1ST HOSP IP/OBS MODERATE 55: CPT | Performed by: SPECIALIST

## 2022-05-10 PROCEDURE — 94799 UNLISTED PULMONARY SVC/PX: CPT

## 2022-05-10 PROCEDURE — 93306 TTE W/DOPPLER COMPLETE: CPT | Performed by: SPECIALIST

## 2022-05-10 PROCEDURE — 84484 ASSAY OF TROPONIN QUANT: CPT | Performed by: HOSPITALIST

## 2022-05-10 RX ORDER — CARVEDILOL 25 MG/1
25 TABLET ORAL 2 TIMES DAILY WITH MEALS
Status: DISCONTINUED | OUTPATIENT
Start: 2022-05-10 | End: 2022-05-11 | Stop reason: HOSPADM

## 2022-05-10 RX ADMIN — ROSUVASTATIN CALCIUM 20 MG: 20 TABLET, FILM COATED ORAL at 00:52

## 2022-05-10 RX ADMIN — CARVEDILOL 25 MG: 25 TABLET, FILM COATED ORAL at 17:43

## 2022-05-10 RX ADMIN — CARVEDILOL 12.5 MG: 6.25 TABLET, FILM COATED ORAL at 08:36

## 2022-05-10 RX ADMIN — ESCITALOPRAM 10 MG: 10 TABLET, FILM COATED ORAL at 00:53

## 2022-05-10 RX ADMIN — Medication 10 ML: at 08:36

## 2022-05-10 RX ADMIN — TRAMADOL HYDROCHLORIDE 25 MG: 50 TABLET, COATED ORAL at 17:48

## 2022-05-10 RX ADMIN — HYDROXYCHLOROQUINE SULFATE 100 MG: 200 TABLET ORAL at 22:03

## 2022-05-10 RX ADMIN — HYDROXYCHLOROQUINE SULFATE 100 MG: 200 TABLET ORAL at 00:53

## 2022-05-10 RX ADMIN — HYDROXYCHLOROQUINE SULFATE 100 MG: 200 TABLET ORAL at 08:36

## 2022-05-10 RX ADMIN — Medication 10 ML: at 22:03

## 2022-05-10 RX ADMIN — PANTOPRAZOLE SODIUM 40 MG: 40 TABLET, DELAYED RELEASE ORAL at 05:43

## 2022-05-10 RX ADMIN — CARVEDILOL 12.5 MG: 6.25 TABLET, FILM COATED ORAL at 00:52

## 2022-05-10 RX ADMIN — ESCITALOPRAM 10 MG: 10 TABLET, FILM COATED ORAL at 22:03

## 2022-05-10 RX ADMIN — ROSUVASTATIN CALCIUM 20 MG: 20 TABLET, FILM COATED ORAL at 22:03

## 2022-05-10 RX ADMIN — CETIRIZINE HYDROCHLORIDE 5 MG: 10 TABLET, FILM COATED ORAL at 08:36

## 2022-05-10 NOTE — CONSULTS
Date of Admit: 5/9/2022  Date of Consult: 05/10/22  No ref. provider found        Atrial flutter with rapid ventricular response (HCC)      Assessment      1. New onset atrial flutter with RVR and variable block  2. Moderate mitral and tricuspid valve reinitiation  3. Mild aortic regurgitation  4. Moderate biatrial enlargement  5. History of TIA/stroke  6. Chronic kidney disease stage III  7. Essential hypertension      Recommendations     1. Will consult pharmacy for NOAC price  2. Discussed with patient JEANETTE/cardioversion but she she is willing to wait for 3 to 4 weeks of anticoagulation subsequently plan for direct current cardioversion  3. Continue with carvedilol for rate control we will increase the dose of carvedilol to 25 mg twice daily and try to wean off the Cardizem drip  4. Blood pressures controlled continue current management  5. Regarding valvular heart disease currently she is not in CHF we will monitor closely        Reason for consultation: New onset atrial flutter    Subjective     Subjective     History of Present Illness     Kelsey Avendano is a 82-year-old female with a past medical history significant for lupus, TIA/stroke, history of diastolic heart failure, chronic kidney disease stage III, hypertension and bradycardia.  Patient presents to the ER with complaints of palpitations. She states that she started experiencing palpitations yesterday morning.  She does have associated shortness of breath.  Denies any chest pain or lower extremity edema.  In the ED she is found to be in atrial flutter with RVR and was started on IV heparin and IV Cardizem.  Denies any history of coronary artery disease or atrial fibrillation/flutter in the past.  Echocardiogram showed EF of 61 to 65% with moderate mitral valve regurgitation, moderate tricuspid valve regurgitation with noted right atrial cavity moderately dilated.  Troponin negative.  Chest x-ray shows no acute findings.    Cardiac risk  factors:diabetes mellitus, hypercholesterolemia, hypertension and Sedentary life style    Last Echo: 5/10/2022  · Left ventricular wall thickness is consistent with mild concentric hypertrophy.  · Left ventricular ejection fraction appears to be 61 - 65%. Left ventricular systolic function is normal.  · Left ventricular diastolic function is consistent with (grade Ia w/high LAP) impaired relaxation.  · Left atrial volume is moderately increased.  · The right atrial cavity is moderately dilated.  · Moderate mitral valve regurgitation is present.  · Moderate tricuspid valve regurgitation is present.  · Estimated right ventricular systolic pressure from tricuspid regurgitation is mildly elevated (35-45 mmHg).     Past Medical History:   Diagnosis Date   • Anemia    • Anxiety    • Aortic insufficiency    • Arthritis    • B12 deficiency    • Bradycardia    • Bronchitis    • CHF (congestive heart failure) ([facility])    • Depression 2006   • GERD (gastroesophageal reflux disease)    • Hypertension    • Insomnia    • Lupus ([facility])    • MRSA (methicillin resistant staph aureus) culture positive    • Osteoporosis    • Renal failure    • Stroke ([facility])     TIA   • TIA (transient ischemic attack)      Past Surgical History:   Procedure Laterality Date   • ANTERIOR AND POSTERIOR VAGINAL REPAIR  04/2008    Along with vaginal vault suspension and PULLP   • BILATERAL SALPINGO OOPHORECTOMY Bilateral 1984   • BREAST BIOPSY Right 1989    benign   • CARDIAC CATHETERIZATION  2008   • DEEP NECK LYMPH NODE BIOPSY / EXCISION  2002   • KNEE ARTHROPLASTY, PARTIAL REPLACEMENT Bilateral    • TOTAL ABDOMINAL HYSTERECTOMY FORDE PROCEDURE  1979     Family History   Problem Relation Age of Onset   • Arthritis Mother    • Heart attack Mother    • Diabetes Father    • Heart attack Father    • Heart failure Sister    • Heart disease Brother    • Heart failure Brother    • Heart failure Sister    • Breast cancer Neg Hx    • Ovarian cancer Neg Hx      Social  History     Tobacco Use   • Smoking status: Never Smoker   • Smokeless tobacco: Never Used   Vaping Use   • Vaping Use: Never used   Substance Use Topics   • Alcohol use: No   • Drug use: No     Medications Prior to Admission   Medication Sig Dispense Refill Last Dose   • carvedilol (COREG) 25 MG tablet Take 25 mg by mouth 2 (Two) Times a Day With Meals.   5/9/2022 at am   • cetirizine (zyrTEC) 10 MG tablet Take 10 mg by mouth Daily.   5/9/2022 at am   • escitalopram (LEXAPRO) 20 MG tablet Take 20 mg by mouth Every Night.   5/8/2022 at pm   • hydroxychloroquine (PLAQUENIL) 200 MG tablet Take 200 mg by mouth 2 (Two) Times a Day.   5/9/2022 at am   • pantoprazole (PROTONIX) 40 MG EC tablet Take 40 mg by mouth 2 (two) times a day.   5/9/2022 at am   • rosuvastatin (CRESTOR) 20 MG tablet Take 20 mg by mouth Every Night.   5/8/2022 at pm   • spironolactone (ALDACTONE) 25 MG tablet Take 25 mg by mouth Daily.   5/9/2022 at am   • furosemide (LASIX) 20 MG tablet Take 10 mg by mouth At Night As Needed.   Unknown at Unknown time   • traMADol (ULTRAM) 50 MG tablet Take 50 mg by mouth Every 6 (Six) Hours As Needed for Moderate Pain .   Unknown at Unknown time     Allergies:  Maxzide [hydrochlorothiazide w-triamterene], Hydralazine hcl, Hytrin [terazosin], and Nifedipine er    Review of Systems   Constitutional: Negative for chills, diaphoresis, fatigue and fever.   HENT: Negative for congestion and trouble swallowing.    Eyes: Negative for photophobia and visual disturbance.   Respiratory: Positive for shortness of breath. Negative for chest tightness and wheezing.    Cardiovascular: Positive for palpitations. Negative for chest pain.   Gastrointestinal: Negative for abdominal pain, nausea and vomiting.   Endocrine: Negative for polyphagia and polyuria.   Genitourinary: Negative for dysuria and hematuria.   Musculoskeletal: Negative for neck stiffness.   Skin: Negative for rash and wound.   Allergic/Immunologic: Negative for  food allergies and immunocompromised state.   Neurological: Negative for dizziness and weakness.   Hematological: Negative for adenopathy. Does not bruise/bleed easily.   Psychiatric/Behavioral: Negative for confusion and suicidal ideas.       Objective       Objective      Vital Signs  Temp:  [97.6 °F (36.4 °C)-98.4 °F (36.9 °C)] 98.4 °F (36.9 °C)  Heart Rate:  [] 80  Resp:  [13-25] 16  BP: (108-164)/() 130/59  Vital Signs (last 72 hrs)       05/07 0700  05/08 0659 05/08 0700  05/09 0659 05/09 0700  05/10 0659 05/10 0700  05/10 1349   Most Recent      Temp (°F)     97.6 -  98    98 -  98.4     98.4 (36.9) 05/10 1303    Heart Rate     60 -  132    69 -  80     80 05/10 1303    Resp     13 -  25    14 -  16     16 05/10 1303    BP     108/65 -  164/76    117/62 -  135/69     130/59 05/10 1303    SpO2 (%)     86 -  100    94 -  98     97 05/10 1303        Body mass index is 23.87 kg/m².  Documented weights    05/09/22 1443 05/09/22 2044 05/10/22 0500   Weight: 65.8 kg (145 lb) 67.1 kg (147 lb 14.4 oz) 67.1 kg (147 lb 14.4 oz)            Intake/Output Summary (Last 24 hours) at 5/10/2022 1349  Last data filed at 5/10/2022 1300  Gross per 24 hour   Intake 1100 ml   Output --   Net 1100 ml     Physical Exam  Constitutional:       General: She is not in acute distress.     Appearance: Normal appearance. She is well-developed and normal weight.   HENT:      Head: Normocephalic and atraumatic.   Eyes:      General: Lids are normal.      Conjunctiva/sclera: Conjunctivae normal.      Pupils: Pupils are equal, round, and reactive to light.   Neck:      Vascular: No carotid bruit or JVD.   Cardiovascular:      Rate and Rhythm: Tachycardia present. Rhythm irregular.      Pulses: Normal pulses.      Heart sounds: Normal heart sounds, S1 normal and S2 normal. No murmur heard.  Pulmonary:      Effort: Pulmonary effort is normal. No respiratory distress.      Breath sounds: Normal breath sounds. No wheezing.   Abdominal:       General: Bowel sounds are normal. There is no distension.      Palpations: Abdomen is soft. There is no hepatomegaly or splenomegaly.      Tenderness: There is no abdominal tenderness.   Musculoskeletal:         General: No swelling. Normal range of motion.      Cervical back: Normal range of motion and neck supple.      Right lower leg: No edema.      Left lower leg: No edema.   Skin:     General: Skin is warm and dry.      Coloration: Skin is not jaundiced.      Findings: No rash.   Neurological:      General: No focal deficit present.      Mental Status: She is alert and oriented to person, place, and time. Mental status is at baseline.   Psychiatric:         Mood and Affect: Mood normal.         Speech: Speech normal.         Behavior: Behavior normal.         Thought Content: Thought content normal.         Judgment: Judgment normal.       Results review     Results Review:    I reviewed the patient's new clinical results.  Results from last 7 days   Lab Units 05/10/22  0601 05/10/22  0012 05/09/22  1750 05/09/22  1553   TROPONIN T ng/mL <0.010 <0.010 <0.010 <0.010     Results from last 7 days   Lab Units 05/10/22  0012 05/09/22  1553   WBC 10*3/mm3 6.01 6.62   HEMOGLOBIN g/dL 11.4* 12.4   PLATELETS 10*3/mm3 143 184     Results from last 7 days   Lab Units 05/10/22  0012 05/09/22  1553   SODIUM mmol/L 137 137   POTASSIUM mmol/L 3.8 4.4   CHLORIDE mmol/L 101 99   CO2 mmol/L 23.7 25.9   BUN mg/dL 25* 26*   CREATININE mg/dL 1.36* 1.40*   CALCIUM mg/dL 8.9 9.5   GLUCOSE mg/dL 124* 107*   ALT (SGPT) U/L 20 22   AST (SGOT) U/L 39* 43*     Lab Results   Component Value Date    INR 0.96 05/09/2022     Lab Results   Component Value Date    MG 1.6 05/09/2022    MG 1.8 09/25/2019    MG 1.8 09/25/2019     Lab Results   Component Value Date    TSH 2.000 05/09/2022    TRIG 104 05/10/2022    HDL 48 05/10/2022    LDL 39 05/10/2022      Lab Results   Component Value Date    PROBNP 3,075.0 (H) 05/09/2022       ECG            ECG/EMG Results (last 24 hours)     Procedure Component Value Units Date/Time    ECG 12 Lead [[id number]] Collected: 05/09/22 1441     Updated: 05/09/22 2313     QT Interval 292 ms      QTC Interval 432 ms     Narrative:      Test Reason : Chest Pain  Blood Pressure :   */*   mmHG  Vent. Rate : 132 BPM     Atrial Rate : 264 BPM     P-R Int :   * ms          QRS Dur :  92 ms      QT Int : 292 ms       P-R-T Axes :   *  30 212 degrees     QTc Int : 432 ms    Atrial flutter with 2:1 AV conduction  Cannot rule out Anterior infarct , age undetermined  Marked ST abnormality, possible inferior subendocardial injury  Abnormal ECG  Confirmed by Chip Gallagher ([id number]) on 5/9/2022 11:13:33 PM    Referred By: CONNIE           Confirmed By: Chip Gallagher     Imaging Results (Last 72 Hours)     Procedure Component Value Units Date/Time    XR Chest 1 View [[id number]] Collected: 05/09/22 1532     Updated: 05/09/22 1534    Narrative:      EXAM:    XR Chest, 1 View     EXAM DATE:    5/9/2022 2:55 PM     CLINICAL HISTORY:    Chest pain protocol     TECHNIQUE:    Frontal view of the chest.     COMPARISON:    02/11/2019     FINDINGS:    LUNGS:  Coarsened interstitial markings noted throughout the lungs.    PLEURAL SPACE:  Unremarkable.  No pneumothorax.    HEART: Heart size is stable.    MEDIASTINUM:  Unremarkable.    BONES/JOINTS:  Unremarkable.       Impression:        No acute cardiopulmonary findings identified.     This report was finalized on 5/9/2022 3:32 PM by Dr. Hugh Lorenzo MD.             I have discussed my impression and recommendations with the patient and family.    Thank you very much for asking us to be involved in this patient's care.  We will follow along with you.    Electronically signed by DORIS Bonilla, 05/10/22, 1:49 PM EDT.  Electronically signed by Randi Umana MD, 05/10/22, 3:55 PM EDT.    Please note that portions of this note were completed with a voice recognition program.

## 2022-05-10 NOTE — CASE MANAGEMENT/SOCIAL WORK
Discharge Planning Assessment  [facility]     Patient Name: Kelsey Avendano  MRN: [id number]  Today's Date: 5/10/2022    Admit Date: 5/9/2022     Discharge Needs Assessment     Row Name 05/10/22 1027       Living Environment    People in Home alone    Current Living Arrangements home    Primary Care Provided by self    Provides Primary Care For no one    Family Caregiver if Needed child(lisandro), adult    Quality of Family Relationships helpful;involved;supportive    Able to Return to Prior Arrangements yes       Resource/Environmental Concerns    Resource/Environmental Concerns none    Transportation Concerns none       Transition Planning    Patient/Family Anticipates Transition to home    Patient/Family Anticipated Services at Transition none    Transportation Anticipated family or friend will provide       Discharge Needs Assessment    Equipment Currently Used at Home none    Concerns to be Addressed no discharge needs identified    Anticipated Changes Related to Illness none    Equipment Needed After Discharge none               Discharge Plan     Row Name 05/10/22 1027       Plan    Plan SS spoke with pt at bedside on this date. Pt lives alone at [address] [place] [address] in [place] and does not utilize home health services or DME at this time. PCP is Sudhir Arita. Pt does not have a POA or living will on file. Pt's family to provide transportation home at discharge. SS to follow and assist.    Patient/Family in Agreement with Plan yes               Demographic Summary     Row Name 05/10/22 1018       General Information    Referral Source nursing    Reason for Consult --  advanced age. assist with safe dc plan              YAKOV Farrar

## 2022-05-10 NOTE — PLAN OF CARE
Goal Outcome Evaluation:     Problem: Adult Inpatient Plan of Care  Goal: Plan of Care Review  5/10/2022 1326 by Danielle Ochoa RN  Outcome: Ongoing, Progressing  Flowsheets  Taken 5/10/2022 1326 by Danielle Ochoa RN  Progress: improving  Taken 5/10/2022 0246 by Renetta Contreras RN  Plan of Care Reviewed With: patient     Problem: Adult Inpatient Plan of Care  Goal: Patient-Specific Goal (Individualized)  5/10/2022 1326 by Danielle Ochoa RN  Outcome: Ongoing, Progressing  5/10/2022 0747 by Danielle Ochoa RN  Outcome: Ongoing, Progressing     Problem: Dysrhythmia  Goal: Normalized Cardiac Rhythm  5/10/2022 1326 by Danielle Ochoa RN  Outcome: Ongoing, Progressing  5/10/2022 0747 by Danielle Ochoa RN  Outcome: Ongoing, Progressing  Intervention: Monitor and Manage Cardiac Rhythm Effect  Flowsheets (Taken 5/10/2022 0839)  VTE Prevention/Management: bleeding risk factor(s) identified

## 2022-05-10 NOTE — H&P
"Hospitalist History and Physical        Patient Identification  Name: Kelsey Avendano  Age/Sex: 82 y.o. female  :  1940        MRN: [id number]  Visit Number: [id number]  Admit Date: 2022   PCP: Sudhir Arita MD          Chief complaint heart racing    History of Present Illness:  Patient is a 82 y.o. female with history of aortic insufficiency, arthritis, lupus, TIA/stroke, diastolic CHF on lasix PRN, stage III CKD, HTN, anxiety, depression, and bradycardia, who presents with complaints of waking up this morning with her heart racing. She denies any significant chest pain, only palpitations. She reports that she had an \"exhausting weekend\" with many family in town for Mother's Day which she felt wore her down. She also reports that last night she was feeling a little congested in her chest and was coughing more, so took a lasix pill for this. She denies any lower extremity edema, however. Upon further questioning, she takes magnesium replacement but has been out of it for the last month.     Review of Systems  Review of Systems   Constitutional: Positive for activity change and fatigue. Negative for appetite change, chills, diaphoresis, fever and unexpected weight change.   HENT: Negative for congestion, postnasal drip, rhinorrhea, sinus pressure, sinus pain and sore throat.    Eyes: Negative for photophobia, pain, discharge, redness, itching and visual disturbance.   Respiratory: Positive for cough. Negative for shortness of breath and wheezing.    Cardiovascular: Positive for palpitations. Negative for chest pain and leg swelling.   Gastrointestinal: Negative for abdominal distention, abdominal pain, constipation, diarrhea, nausea and vomiting.   Endocrine: Negative for cold intolerance, heat intolerance, polydipsia, polyphagia and polyuria.   Genitourinary: Negative for difficulty urinating, dysuria, flank pain, frequency and hematuria.   Musculoskeletal: Positive for arthralgias (chronic). " Negative for back pain, joint swelling, myalgias, neck pain and neck stiffness.   Skin: Negative for color change, pallor, rash and wound.   Neurological: Positive for weakness (generalized). Negative for dizziness, tremors, seizures, syncope, light-headedness, numbness and headaches.   Hematological: Negative for adenopathy. Does not bruise/bleed easily.   Psychiatric/Behavioral: Negative for agitation, behavioral problems and confusion.       History  Past Medical History:   Diagnosis Date   • Anemia    • Anxiety    • Aortic insufficiency    • Arthritis    • B12 deficiency    • Bradycardia    • Bronchitis    • CHF (congestive heart failure) (CMS/[facility])    • Depression 2006   • GERD (gastroesophageal reflux disease)    • Hypertension    • Insomnia    • Lupus (CMS/[facility])    • MRSA (methicillin resistant staph aureus) culture positive    • Osteoporosis    • Renal failure    • Stroke (CMS/[facility])     TIA   • TIA (transient ischemic attack)      Past Surgical History:   Procedure Laterality Date   • ANTERIOR AND POSTERIOR VAGINAL REPAIR  04/2008    Along with vaginal vault suspension and PULLP   • BILATERAL SALPINGO OOPHORECTOMY Bilateral 1984   • BREAST BIOPSY Right 1989    benign   • CARDIAC CATHETERIZATION  2008   • DEEP NECK LYMPH NODE BIOPSY / EXCISION  2002   • KNEE ARTHROPLASTY, PARTIAL REPLACEMENT Bilateral    • TOTAL ABDOMINAL HYSTERECTOMY FORDE PROCEDURE  1979     Family History   Problem Relation Age of Onset   • Arthritis Mother    • Heart attack Mother    • Diabetes Father    • Heart attack Father    • Heart failure Sister    • Heart disease Brother    • Heart failure Brother    • Heart failure Sister    • Breast cancer Neg Hx    • Ovarian cancer Neg Hx      Social History     Tobacco Use   • Smoking status: Never Smoker   • Smokeless tobacco: Never Used   Substance Use Topics   • Alcohol use: No   • Drug use: No     (Not in a hospital admission)    Allergies:  Hydralazine hcl, Hytrin [terazosin], Maxzide  [hydrochlorothiazide w-triamterene], and Nifedipine er    Objective     Vital Signs  Temp:  [98 °F (36.7 °C)] 98 °F (36.7 °C)  Heart Rate:  [] 66  Resp:  [16-18] 16  BP: (130-164)/() 164/76  Body mass index is 23.4 kg/m².    Physical Exam:  Physical Exam  Constitutional:       General: She is not in acute distress.     Appearance: Normal appearance. She is not ill-appearing.   HENT:      Head: Normocephalic and atraumatic.      Right Ear: External ear normal.      Left Ear: External ear normal.      Nose: Nose normal.      Mouth/Throat:      Mouth: Mucous membranes are moist.      Pharynx: Oropharynx is clear.   Eyes:      Extraocular Movements: Extraocular movements intact.      Conjunctiva/sclera: Conjunctivae normal.      Pupils: Pupils are equal, round, and reactive to light.   Cardiovascular:      Rate and Rhythm: Normal rate and regular rhythm.      Pulses: Normal pulses.      Heart sounds: Normal heart sounds. No murmur heard.  Pulmonary:      Effort: Pulmonary effort is normal. No respiratory distress.      Breath sounds: Normal breath sounds. No wheezing or rales.   Abdominal:      General: Abdomen is flat. Bowel sounds are normal. There is no distension.      Palpations: Abdomen is soft.      Tenderness: There is no abdominal tenderness.   Musculoskeletal:         General: Normal range of motion.      Cervical back: Normal range of motion and neck supple. No tenderness.      Right lower leg: No edema.      Left lower leg: No edema.   Lymphadenopathy:      Cervical: No cervical adenopathy.   Skin:     General: Skin is warm and dry.      Capillary Refill: Capillary refill takes less than 2 seconds.      Coloration: Skin is not jaundiced.      Findings: No bruising or lesion.   Neurological:      General: No focal deficit present.      Mental Status: She is alert and oriented to person, place, and time.   Psychiatric:         Mood and Affect: Mood normal.         Behavior: Behavior normal.          Thought Content: Thought content normal.         Judgment: Judgment normal.           Results Review:       Lab Results:  Results from last 7 days   Lab Units 05/09/22  1553   WBC 10*3/mm3 6.62   HEMOGLOBIN g/dL 12.4   PLATELETS 10*3/mm3 184         Results from last 7 days   Lab Units 05/09/22  1553   SODIUM mmol/L 137   POTASSIUM mmol/L 4.4   CHLORIDE mmol/L 99   CO2 mmol/L 25.9   BUN mg/dL 26*   CREATININE mg/dL 1.40*   CALCIUM mg/dL 9.5   GLUCOSE mg/dL 107*     Results from last 7 days   Lab Units 05/09/22  1750   MAGNESIUM mg/dL 1.6     Hemoglobin A1C   Date Value Ref Range Status   05/09/2022 5.90 (H) 4.80 - 5.60 % Final     Results from last 7 days   Lab Units 05/09/22  1553   BILIRUBIN mg/dL 0.5   ALK PHOS U/L 75   AST (SGOT) U/L 43*   ALT (SGPT) U/L 22     Results from last 7 days   Lab Units 05/09/22  1750 05/09/22  1553   TROPONIN T ng/mL <0.010 <0.010         Results from last 7 days   Lab Units 05/09/22  1553   INR  0.96           I have reviewed the patient's laboratory results.    Imaging:  Imaging Results (Last 72 Hours)     Procedure Component Value Units Date/Time    XR Chest 1 View [[id number]] Collected: 05/09/22 1532     Updated: 05/09/22 1534    Narrative:      EXAM:    XR Chest, 1 View     EXAM DATE:    5/9/2022 2:55 PM     CLINICAL HISTORY:    Chest pain protocol     TECHNIQUE:    Frontal view of the chest.     COMPARISON:    02/11/2019     FINDINGS:    LUNGS:  Coarsened interstitial markings noted throughout the lungs.    PLEURAL SPACE:  Unremarkable.  No pneumothorax.    HEART: Heart size is stable.    MEDIASTINUM:  Unremarkable.    BONES/JOINTS:  Unremarkable.       Impression:        No acute cardiopulmonary findings identified.     This report was finalized on 5/9/2022 3:32 PM by Dr. Hugh Lorenzo MD.             I have personally reviewed the patient's radiologic imaging.        EKG:   Atrial flutter with 2:1 AV conduction, , QTc 432  Cannot rule out Anterior infarct , age  undetermined  Marked ST abnormality, possible inferior subendocardial injury  Abnormal ECG  When compared with ECG of 11-FEB-2019 23:14,  Atrial flutter has replaced Sinus rhythm  Vent. rate has increased BY 67 BPM  Inverted T waves have replaced nonspecific T wave abnormality in Lateral leads    I have personally reviewed the patient's EKG.        Assessment/Plan     - Atrial flutter with rapid ventricular response (HCC), new onset: admitted to PCU. Currently on cardizem drip at 5mg/hr, with HR now down to the 60s and rhythm appearing more regular. Asked RN to call RT for STAT EKG to confirm rhythm and to hold cardizem drip if HR<70. [id number] score is at least 6 for HTN, hx TIA, age >57 (2 points) and female sex alone. Therefore continue with IV heparin drip. In terms of etiology of atrial flutter, TSH normal, K+ normal, but mag low-normal and patient admits to running out of replacement about a month ago while still taking lasix PRN so this could have contributing some to her new onset arrhythmia. Replace mag per protocol. No obvious infectious source at this time. Trend troponin to rule out cardiac ischemia as EKG did show ST depression in lateral leads though difficult to know if this is reflecting true atherosclerotic ischemia or demand ischemia from uncontrolled atrial flutter. Evaluate further with ECHO in the morning. Consult cardiology for further assistance in management as well.  - Stage IIIb CKD: creatinine 1.40; patient reports this is what her creatinine has been on recent labs in the last year or so (or last creatinine was 1.10 from 8/202). Received IV fluid bolus in the ED. Monitor renal function off additional IV fluids for now. Avoid nephrotoxic agents. Repeat labs in the morning.  - Chronic diastolic CHF: appears compensated at this time. Elevated BNP likely secondary to uncontrolled atrial flutter and stage IIIb CKD rather than true volume overload. Last ECHO in our system showed normal EF  with no diastolic dysfunction, but ECHO prior to that showed grade II diastolic CHF. Repeat ECHO pending in the morning as noted above.   - Hypertension: -160s here thus far. Review home meds once reconciled by pharmacy.  - Pre-diabetes: based on A1c 5.9. Add consistent carb restrictions to diet.   - History TIA/Stroke: supportive care at this time. Review home meds once reconciled by pharmacy.  - History lupus: appears compensated at this time. Continue plaquenil in outpatient setting.     DVT Prophylaxis: anticoagulated on IV heparin drip as noted above    Estimated Length of Stay >2 midnights    I discussed the patient's findings, assessment and plan with the patient and her son at bedside, along with nursing staff in the PCU.    * patient is high risk due to new onset atrial flutter with RVR    Lui Abdul MD  05/09/22  20:30 EDT

## 2022-05-10 NOTE — PROGRESS NOTES
"    [facility] HOSPITALIST PROGRESS NOTE    S: Patient seen and examined.  States she feels well.  No new complaints    O: /70 (BP Location: Right arm, Patient Position: Lying)   Pulse 72   Temp 97.6 °F (36.4 °C) (Axillary)   Resp 13   Ht 167.6 cm (66\")   Wt 67.1 kg (147 lb 14.4 oz)   SpO2 97%   BMI 23.87 kg/m²     GENERAL:  age appropriate, NAD  EARS,NOSE, MOUTH, THROAT:  MMM, hearing intact  EYES: PERRL, EOMI  CV:  nl s1/s2  RESPIRATORY:  CTAB no w/c/r  GI:  S/NT/ND +BS  SKIN: No rash or lesions. No nodules.  INT: No edema. No joint deformity.  PSYCH:  Normal affect, A+O x 3  NEURO: Alert and oriented, grossly non focal.      Scheduled Meds:carvedilol, 12.5 mg, Oral, BID With Meals  cetirizine, 5 mg, Oral, Daily  escitalopram, 10 mg, Oral, Nightly  hydroxychloroquine, 100 mg, Oral, BID  pantoprazole, 40 mg, Oral, Q AM  rosuvastatin, 20 mg, Oral, Nightly  sodium chloride, 10 mL, Intravenous, Q12H      Continuous Infusions:dilTIAZem, 5 mg/hr, Last Rate: Stopped (05/09/22 2106)  heparin, 12 Units/kg/hr, Last Rate: 11 Units/kg/hr (05/10/22 0707)      PRN Meds:.magnesium sulfate **OR** magnesium sulfate in D5W 1g/100mL (PREMIX) **OR** magnesium sulfate  •  nitroglycerin  •  sodium chloride  •  traMADol    Labs: Laboratory information reviewed and reconciled.  Results from last 7 days   Lab Units 05/10/22  0012   WBC 10*3/mm3 6.01       Results from last 7 days   Lab Units 05/10/22  0012   CO2 mmol/L 23.7   BUN mg/dL 25*   CREATININE mg/dL 1.36*   GLUCOSE mg/dL 124*       Results from last 7 days   Lab Units 05/09/22  1553   INR  0.96           Imaging (last 24 hours):  [unfilled]    Assessment/Plan:  # New onset afib w/ RVR  - cardizem weaned off this AM, remains rate controlled  - cardio to evaluate  - TTE  - monitor on tele    # CKD stage III  - serial labs  - renally dose meds    # Chronic diastolic HF  - monitor volume status  - continue home regimen    # Prediabetes  - outpatient follow " up      DVT Prophylaxis:heparin  Disposition: Continue to monitor  Discharge disposition: Likely home when ok with cars  Prognosis: fair    Vargas Almaguer Jr, MD  [facility] Hospitalist  05/10/22  08:01 EDT

## 2022-05-10 NOTE — PLAN OF CARE
Goal Outcome Evaluation:  Plan of Care Reviewed With: patient           Outcome Evaluation: Pt welcomed and greeted by PCU staff upon arrival to floor. VSS at this time on room air. Cardizem gtts off at this time per order paramters. Heparin titrated per PTT, currently infusing at 9u/kg/hr. Bed in low position with alarm activated.

## 2022-05-10 NOTE — ED PROVIDER NOTES
Subjective   Pt states she woke up this am and had some tachycardia. Denies any hx of Mi, Atrial fibrillation.   Pt has hx of Lupus, HTN, GERD, Aortic Insufficiency       History provided by:  Patient   used: No    Chest Pain  Pain location:  L chest  Pain quality: aching    Pain radiates to:  Does not radiate  Pain severity:  Mild  Onset quality:  Sudden  Duration:  1 day  Timing:  Constant  Progression:  Worsening  Chronicity:  New  Context: breathing    Relieved by:  Nothing  Worsened by:  Nothing  Ineffective treatments:  None tried  Associated symptoms: palpitations    Associated symptoms: no cough, no fever, no headache, no nausea, no shortness of breath and no vomiting        Review of Systems   Constitutional: Positive for activity change. Negative for chills and fever.   HENT: Negative for congestion, ear pain and sore throat.    Respiratory: Negative for cough, shortness of breath and wheezing.    Cardiovascular: Positive for chest pain and palpitations.   Gastrointestinal: Negative for diarrhea, nausea and vomiting.   Genitourinary: Negative for dysuria and flank pain.   Skin: Negative for rash.   Neurological: Negative for headaches.   Psychiatric/Behavioral: The patient is not nervous/anxious.    All other systems reviewed and are negative.      Past Medical History:   Diagnosis Date   • Anemia    • Anxiety    • Aortic insufficiency    • Arthritis    • B12 deficiency    • Bradycardia    • Bronchitis    • CHF (congestive heart failure) ([facility])    • Depression 2006   • GERD (gastroesophageal reflux disease)    • Hypertension    • Insomnia    • Lupus (HCC)    • MRSA (methicillin resistant staph aureus) culture positive    • Osteoporosis    • Renal failure    • Stroke ([facility])     TIA   • TIA (transient ischemic attack)        Allergies   Allergen Reactions   • Maxzide [Hydrochlorothiazide W-Triamterene] Other (See Comments)     Kidney issues 06/30/20     • Hydralazine Hcl Photosensitivity,  "Palpitations, GI Intolerance and Headache     Chest pains, headache, diarrhea   • Hytrin [Terazosin] Palpitations     Visual issues- \"bright spots in eyes\" 6/2020   • Nifedipine Er Rash     flushing       Past Surgical History:   Procedure Laterality Date   • ANTERIOR AND POSTERIOR VAGINAL REPAIR  04/2008    Along with vaginal vault suspension and PULLP   • BILATERAL SALPINGO OOPHORECTOMY Bilateral 1984   • BREAST BIOPSY Right 1989    benign   • CARDIAC CATHETERIZATION  2008   • DEEP NECK LYMPH NODE BIOPSY / EXCISION  2002   • KNEE ARTHROPLASTY, PARTIAL REPLACEMENT Bilateral    • TOTAL ABDOMINAL HYSTERECTOMY FORDE PROCEDURE  1979       Family History   Problem Relation Age of Onset   • Arthritis Mother    • Heart attack Mother    • Diabetes Father    • Heart attack Father    • Heart failure Sister    • Heart disease Brother    • Heart failure Brother    • Heart failure Sister    • Breast cancer Neg Hx    • Ovarian cancer Neg Hx        Social History     Socioeconomic History   • Marital status:    Tobacco Use   • Smoking status: Never Smoker   • Smokeless tobacco: Never Used   Vaping Use   • Vaping Use: Never used   Substance and Sexual Activity   • Alcohol use: No   • Drug use: No   • Sexual activity: Defer           Objective   Physical Exam  Vitals and nursing note reviewed.   Constitutional:       Appearance: She is well-developed.   HENT:      Head: Normocephalic.   Cardiovascular:      Rate and Rhythm: Normal rate and regular rhythm.   Pulmonary:      Effort: Pulmonary effort is normal.      Breath sounds: Normal breath sounds.   Abdominal:      General: Bowel sounds are normal.      Palpations: Abdomen is soft.      Tenderness: There is no abdominal tenderness.   Musculoskeletal:         General: Normal range of motion.      Cervical back: Neck supple.   Skin:     General: Skin is warm and dry.   Neurological:      Mental Status: She is alert and oriented to person, place, and time.   Psychiatric:    "      Behavior: Behavior normal.         Thought Content: Thought content normal.         Judgment: Judgment normal.         Procedures           ED Course  ED Course as of 05/10/22 1804   Mon May 09, 2022   1450 eCG 14;41 Atrial flutter with 2:1 AV block, rate 132. Marked ST abnormality consistent with subendocardial injury. QT/QTc 292/432 [EMMANUEL]   1613 Discussed with Dr Ochoa, will give pt Cardizem and heparin per protocol  [LC]   1921 Discussed with Dr Montes will admit  [LC]      ED Course User Index  [EMMANUEL] Thomas German MD  [LC] Loraine German PA                                                 [facility]    Final diagnoses:   Atrial flutter, unspecified type (HCC)       ED Disposition  ED Disposition     ED Disposition   Decision to Admit    Condition   --    Comment   Level of Care: Telemetry [5]   Diagnosis: Atrial flutter with rapid ventricular response (HCC) [467793]   Admitting Physician: MACY MONTES [[id number]]   Attending Physician: MACY MONTES [[id number]]   Certification: I Certify That Inpatient Hospital Services Are Medically Necessary For Greater Than 2 Midnights               No follow-up provider specified.       Medication List      ASK your doctor about these medications    carvedilol 25 MG tablet  Commonly known as: COREG  Ask about: Which instructions should I use?     rosuvastatin 20 MG tablet  Commonly known as: CRESTOR  Ask about: Which instructions should I use?     spironolactone 25 MG tablet  Commonly known as: ALDACTONE  Ask about: Which instructions should I use?             Loraine German PA  05/10/22 1804

## 2022-05-10 NOTE — PAYOR COMM NOTE
"[facility]  AILEEN YUSUF  PHONE [phone]  FAX  [phone]  NPI:  [id number]    PENDING REF#[id number]    Kelsey Mccoy (82 y.o. Female)             Date of Birth   1940    Social Security Number       Address   [address] [facility] [address]    Home Phone   [phone]    MRN   [id number]       Religious   Moravian    Marital Status                               Admission Date   5/9/22    Admission Type   Emergency    Admitting Provider   Lui Abdul MD    Attending Provider   Vargas Almaguer Jr., MD    Department, Room/Bed   [facility] PROGRESS CARE, P216/S2       Discharge Date       Discharge Disposition       Discharge Destination                               Attending Provider: Vargas Almaguer Jr., MD    Allergies: Maxzide [Hydrochlorothiazide W-triamterene], Hydralazine Hcl, Hytrin [Terazosin], Nifedipine Er    Isolation: None   Infection: COVID Screen (preop/placement) (12/22/21)   Code Status: CPR   Advance Care Planning Activity    Ht: 167.6 cm (66\")   Wt: 67.1 kg (147 lb 14.4 oz)    Admission Cmt: None   Principal Problem: None                Active Insurance as of 5/9/2022     Primary Coverage     Payor Plan Insurance Group Employer/Plan Group    HUMANA MEDICARE REPLACEMENT HUMANA MEDICARE REPLACEMENT [id number]     Payor Plan Address Payor Plan Phone Number Payor Plan Fax Number Effective Dates    [address] [address] [phone]  1/1/2019 - None Entered    [facility] [address]       Subscriber Name Subscriber Birth Date Member ID       KELSEY MCCOY 1940 [id number]                 Emergency Contacts      (Rel.) Home Phone Work Phone Mobile Phone    Zaina Stephens (Sister) -- -- [phone]    Nito Mccoy (Son) [phone] -- --    MARCOS MCCOY (Son) [phone] -- --               History & Physical      Lui Abdul MD at 05/09/22 2030          Hospitalist History and Physical        Patient " "Identification  Name: Kelsey Avendano  Age/Sex: 82 y.o. female  :  1940        MRN: [id number]  Visit Number: [id number]  Admit Date: 2022   PCP: Sudhir Arita MD          Chief complaint heart racing    History of Present Illness:  Patient is a 82 y.o. female with history of aortic insufficiency, arthritis, lupus, TIA/stroke, diastolic CHF on lasix PRN, stage III CKD, HTN, anxiety, depression, and bradycardia, who presents with complaints of waking up this morning with her heart racing. She denies any significant chest pain, only palpitations. She reports that she had an \"exhausting weekend\" with many family in town for Mother's Day which she felt wore her down. She also reports that last night she was feeling a little congested in her chest and was coughing more, so took a lasix pill for this. She denies any lower extremity edema, however. Upon further questioning, she takes magnesium replacement but has been out of it for the last month.     Review of Systems  Review of Systems   Constitutional: Positive for activity change and fatigue. Negative for appetite change, chills, diaphoresis, fever and unexpected weight change.   HENT: Negative for congestion, postnasal drip, rhinorrhea, sinus pressure, sinus pain and sore throat.    Eyes: Negative for photophobia, pain, discharge, redness, itching and visual disturbance.   Respiratory: Positive for cough. Negative for shortness of breath and wheezing.    Cardiovascular: Positive for palpitations. Negative for chest pain and leg swelling.   Gastrointestinal: Negative for abdominal distention, abdominal pain, constipation, diarrhea, nausea and vomiting.   Endocrine: Negative for cold intolerance, heat intolerance, polydipsia, polyphagia and polyuria.   Genitourinary: Negative for difficulty urinating, dysuria, flank pain, frequency and hematuria.   Musculoskeletal: Positive for arthralgias (chronic). Negative for back pain, joint swelling, myalgias, " neck pain and neck stiffness.   Skin: Negative for color change, pallor, rash and wound.   Neurological: Positive for weakness (generalized). Negative for dizziness, tremors, seizures, syncope, light-headedness, numbness and headaches.   Hematological: Negative for adenopathy. Does not bruise/bleed easily.   Psychiatric/Behavioral: Negative for agitation, behavioral problems and confusion.       History  Past Medical History:   Diagnosis Date   • Anemia    • Anxiety    • Aortic insufficiency    • Arthritis    • B12 deficiency    • Bradycardia    • Bronchitis    • CHF (congestive heart failure) (CMS/[facility])    • Depression 2006   • GERD (gastroesophageal reflux disease)    • Hypertension    • Insomnia    • Lupus (CMS/[facility])    • MRSA (methicillin resistant staph aureus) culture positive    • Osteoporosis    • Renal failure    • Stroke (CMS/[facility])     TIA   • TIA (transient ischemic attack)      Past Surgical History:   Procedure Laterality Date   • ANTERIOR AND POSTERIOR VAGINAL REPAIR  04/2008    Along with vaginal vault suspension and PULLP   • BILATERAL SALPINGO OOPHORECTOMY Bilateral 1984   • BREAST BIOPSY Right 1989    benign   • CARDIAC CATHETERIZATION  2008   • DEEP NECK LYMPH NODE BIOPSY / EXCISION  2002   • KNEE ARTHROPLASTY, PARTIAL REPLACEMENT Bilateral    • TOTAL ABDOMINAL HYSTERECTOMY FORDE PROCEDURE  1979     Family History   Problem Relation Age of Onset   • Arthritis Mother    • Heart attack Mother    • Diabetes Father    • Heart attack Father    • Heart failure Sister    • Heart disease Brother    • Heart failure Brother    • Heart failure Sister    • Breast cancer Neg Hx    • Ovarian cancer Neg Hx      Social History     Tobacco Use   • Smoking status: Never Smoker   • Smokeless tobacco: Never Used   Substance Use Topics   • Alcohol use: No   • Drug use: No     (Not in a hospital admission)    Allergies:  Hydralazine hcl, Hytrin [terazosin], Maxzide [hydrochlorothiazide w-triamterene], and Nifedipine  er    Objective     Vital Signs  Temp:  [98 °F (36.7 °C)] 98 °F (36.7 °C)  Heart Rate:  [] 66  Resp:  [16-18] 16  BP: (130-164)/() 164/76  Body mass index is 23.4 kg/m².    Physical Exam:  Physical Exam  Constitutional:       General: She is not in acute distress.     Appearance: Normal appearance. She is not ill-appearing.   HENT:      Head: Normocephalic and atraumatic.      Right Ear: External ear normal.      Left Ear: External ear normal.      Nose: Nose normal.      Mouth/Throat:      Mouth: Mucous membranes are moist.      Pharynx: Oropharynx is clear.   Eyes:      Extraocular Movements: Extraocular movements intact.      Conjunctiva/sclera: Conjunctivae normal.      Pupils: Pupils are equal, round, and reactive to light.   Cardiovascular:      Rate and Rhythm: Normal rate and regular rhythm.      Pulses: Normal pulses.      Heart sounds: Normal heart sounds. No murmur heard.  Pulmonary:      Effort: Pulmonary effort is normal. No respiratory distress.      Breath sounds: Normal breath sounds. No wheezing or rales.   Abdominal:      General: Abdomen is flat. Bowel sounds are normal. There is no distension.      Palpations: Abdomen is soft.      Tenderness: There is no abdominal tenderness.   Musculoskeletal:         General: Normal range of motion.      Cervical back: Normal range of motion and neck supple. No tenderness.      Right lower leg: No edema.      Left lower leg: No edema.   Lymphadenopathy:      Cervical: No cervical adenopathy.   Skin:     General: Skin is warm and dry.      Capillary Refill: Capillary refill takes less than 2 seconds.      Coloration: Skin is not jaundiced.      Findings: No bruising or lesion.   Neurological:      General: No focal deficit present.      Mental Status: She is alert and oriented to person, place, and time.   Psychiatric:         Mood and Affect: Mood normal.         Behavior: Behavior normal.         Thought Content: Thought content normal.          Judgment: Judgment normal.           Results Review:       Lab Results:  Results from last 7 days   Lab Units 05/09/22  1553   WBC 10*3/mm3 6.62   HEMOGLOBIN g/dL 12.4   PLATELETS 10*3/mm3 184         Results from last 7 days   Lab Units 05/09/22  1553   SODIUM mmol/L 137   POTASSIUM mmol/L 4.4   CHLORIDE mmol/L 99   CO2 mmol/L 25.9   BUN mg/dL 26*   CREATININE mg/dL 1.40*   CALCIUM mg/dL 9.5   GLUCOSE mg/dL 107*     Results from last 7 days   Lab Units 05/09/22  1750   MAGNESIUM mg/dL 1.6     Hemoglobin A1C   Date Value Ref Range Status   05/09/2022 5.90 (H) 4.80 - 5.60 % Final     Results from last 7 days   Lab Units 05/09/22  1553   BILIRUBIN mg/dL 0.5   ALK PHOS U/L 75   AST (SGOT) U/L 43*   ALT (SGPT) U/L 22     Results from last 7 days   Lab Units 05/09/22  1750 05/09/22  1553   TROPONIN T ng/mL <0.010 <0.010         Results from last 7 days   Lab Units 05/09/22  1553   INR  0.96           I have reviewed the patient's laboratory results.    Imaging:  Imaging Results (Last 72 Hours)     Procedure Component Value Units Date/Time    XR Chest 1 View [[id number]] Collected: 05/09/22 1532     Updated: 05/09/22 1534    Narrative:      EXAM:    XR Chest, 1 View     EXAM DATE:    5/9/2022 2:55 PM     CLINICAL HISTORY:    Chest pain protocol     TECHNIQUE:    Frontal view of the chest.     COMPARISON:    02/11/2019     FINDINGS:    LUNGS:  Coarsened interstitial markings noted throughout the lungs.    PLEURAL SPACE:  Unremarkable.  No pneumothorax.    HEART: Heart size is stable.    MEDIASTINUM:  Unremarkable.    BONES/JOINTS:  Unremarkable.       Impression:        No acute cardiopulmonary findings identified.     This report was finalized on 5/9/2022 3:32 PM by Dr. Hugh Lorenzo MD.             I have personally reviewed the patient's radiologic imaging.        EKG:   Atrial flutter with 2:1 AV conduction, , QTc 432  Cannot rule out Anterior infarct , age undetermined  Marked ST abnormality, possible inferior  subendocardial injury  Abnormal ECG  When compared with ECG of 11-FEB-2019 23:14,  Atrial flutter has replaced Sinus rhythm  Vent. rate has increased BY 67 BPM  Inverted T waves have replaced nonspecific T wave abnormality in Lateral leads    I have personally reviewed the patient's EKG.        Assessment/Plan     - Atrial flutter with rapid ventricular response (HCC), new onset: admitted to PCU. Currently on cardizem drip at 5mg/hr, with HR now down to the 60s and rhythm appearing more regular. Asked RN to call RT for STAT EKG to confirm rhythm and to hold cardizem drip if HR<70. [id number] score is at least 6 for HTN, hx TIA, age >57 (2 points) and female sex alone. Therefore continue with IV heparin drip. In terms of etiology of atrial flutter, TSH normal, K+ normal, but mag low-normal and patient admits to running out of replacement about a month ago while still taking lasix PRN so this could have contributing some to her new onset arrhythmia. Replace mag per protocol. No obvious infectious source at this time. Trend troponin to rule out cardiac ischemia as EKG did show ST depression in lateral leads though difficult to know if this is reflecting true atherosclerotic ischemia or demand ischemia from uncontrolled atrial flutter. Evaluate further with ECHO in the morning. Consult cardiology for further assistance in management as well.  - Stage IIIb CKD: creatinine 1.40; patient reports this is what her creatinine has been on recent labs in the last year or so (or last creatinine was 1.10 from 8/202). Received IV fluid bolus in the ED. Monitor renal function off additional IV fluids for now. Avoid nephrotoxic agents. Repeat labs in the morning.  - Chronic diastolic CHF: appears compensated at this time. Elevated BNP likely secondary to uncontrolled atrial flutter and stage IIIb CKD rather than true volume overload. Last ECHO in our system showed normal EF with no diastolic dysfunction, but ECHO prior to that  showed grade II diastolic CHF. Repeat ECHO pending in the morning as noted above.   - Hypertension: -160s here thus far. Review home meds once reconciled by pharmacy.  - Pre-diabetes: based on A1c 5.9. Add consistent carb restrictions to diet.   - History TIA/Stroke: supportive care at this time. Review home meds once reconciled by pharmacy.  - History lupus: appears compensated at this time. Continue plaquenil in outpatient setting.     DVT Prophylaxis: anticoagulated on IV heparin drip as noted above    Estimated Length of Stay >2 midnights    I discussed the patient's findings, assessment and plan with the patient and her son at bedside, along with nursing staff in the PCU.    * patient is high risk due to new onset atrial flutter with RVR    Lui Abdul MD  05/09/22  20:30 EDT      Electronically signed by Lui Abdul MD at 05/09/22 2048          Emergency Department Notes      Laury Cano at 05/09/22 1443        EKG completed @ 1441. Given to Dr German @ 1443    Electronically signed by Laury Cano at 05/09/22 1444     Veronique Mayen PA-C at 05/09/22 1443                 MEDICAL SCREENING:    Reason for Visit: Chest pain    Patient initially seen in triage.  The patient was advised further evaluation and diagnostic testing will be needed, some of the treatment and testing will be initiated in the lobby in order to begin the process.  The patient will be returned to the waiting area for the time being and possibly be re-assessed by a subsequent ED provider.  The patient will be brought back to the treatment area in as timely manner as possible.         Veronique Mayen PA-C  05/09/22 1445      Electronically signed by Veronique Mayen PA-C at 05/09/22 1445     Jeanie Birmingham at 05/09/22 1900        Called for pt a sprite zero     Electronically signed by Jeanie Birmingham at 05/09/22 1900         Current Facility-Administered Medications    Medication Dose Route Frequency Provider Last Rate Last Admin   • carvedilol (COREG) tablet 12.5 mg  12.5 mg Oral BID With Meals Lui Abdul MD   12.5 mg at 05/10/22 0836   • cetirizine (zyrTEC) tablet 5 mg  5 mg Oral Daily Lui Abdul MD   5 mg at 05/10/22 0836   • dilTIAZem (CARDIZEM) 125 mg in 125 mL 0.7% sodium chloride  infusion  5 mg/hr Intravenous Continuous Lui Abdul MD   Stopped at 05/09/22 2106   • escitalopram (LEXAPRO) tablet 10 mg  10 mg Oral Nightly Lui Abdul MD   10 mg at 05/10/22 0053   • heparin 15873 units/250 mL (100 units/mL) in 0.9% NaCl infusion  12 Units/kg/hr Intravenous Titrated Lui Abdul MD 7.23 mL/hr at 05/10/22 0707 11 Units/kg/hr at 05/10/22 0707   • hydroxychloroquine (PLAQUENIL) tablet 100 mg  100 mg Oral BID Lui Abdul MD   100 mg at 05/10/22 0836   • magnesium sulfate 4 gram infusion - Mg less than or equal to 1mg/dL  4 g Intravenous PRN Lui Abdul MD        Or   • magnesium sulfate 3 gram infusion (1gm x 3) - Mg 1.1 - 1.5 mg/dL  1 g Intravenous PRN Lui Abdul MD        Or   • Magnesium Sulfate 2 gram infusion- Mg 1.6 - 1.9 mg/dL  2 g Intravenous PRN Lui bAdul MD       • nitroglycerin (NITROSTAT) SL tablet 0.4 mg  0.4 mg Sublingual Q5 Min PRN Lui Abdul MD       • pantoprazole (PROTONIX) EC tablet 40 mg  40 mg Oral Q AM Lui Abdul MD   40 mg at 05/10/22 0543   • rosuvastatin (CRESTOR) tablet 20 mg  20 mg Oral Nightly Lui Abdul MD   20 mg at 05/10/22 0052   • sodium chloride 0.9 % flush 10 mL  10 mL Intravenous Q12H Lui Abdul MD   10 mL at 05/10/22 0836   • sodium chloride 0.9 % flush 10 mL  10 mL Intravenous PRN Lui Abdul MD       • traMADol (ULTRAM) tablet 25 mg  25 mg Oral Q6H PRN Lui Abdul MD         Orders (last 24 hrs)      Start     Ordered    05/10/22 1300  aPTT  Timed         05/10/22 0682     05/10/22 0900  cetirizine (zyrTEC) tablet 5 mg  Daily         05/09/22 2259    05/10/22 0704  Case Management  Consult  Once        Provider:  (Not yet assigned)    05/10/22 0704    05/10/22 0700  Adult Transthoracic Echo Complete w/ Color, Spectral and Contrast if necessary per protocol  Once         05/09/22 2027    05/10/22 0630  aPTT  Timed         05/10/22 0514    05/10/22 0600  CBC & Differential  Every Third Day      Comments: Discontinue After Heparin Stopped      05/09/22 1725    05/10/22 0600  CBC Auto Differential  Morning Draw,   Status:  Canceled         05/09/22 2027    05/10/22 0600  Comprehensive Metabolic Panel  Morning Draw         05/09/22 2027    05/10/22 0600  Lipid Panel  Morning Draw         05/09/22 2027    05/10/22 0600  CBC Auto Differential  PROCEDURE ONCE        Comments: Discontinue After Heparin Stopped      05/09/22 2205    05/10/22 0600  pantoprazole (PROTONIX) EC tablet 40 mg  Every Early Morning         05/09/22 2259    05/10/22 0000  Vital Signs  Every 8 Hours        Comments: Per per hospital policy    05/09/22 2027    05/10/22 0000  Strict Intake & Output  Every 6 Hours         05/09/22 2027    05/10/22 0000  aPTT  Once         05/09/22 2107 05/09/22 2359  Troponin  Now Then Every 6 Hours       05/09/22 2027 05/09/22 2345  hydroxychloroquine (PLAQUENIL) tablet 200 mg  2 Times Daily,   Status:  Discontinued         05/09/22 2259 05/09/22 2345  escitalopram (LEXAPRO) tablet 10 mg  Nightly         05/09/22 2259 05/09/22 2345  carvedilol (COREG) tablet 12.5 mg  2 Times Daily With Meals         05/09/22 2259 05/09/22 2345  rosuvastatin (CRESTOR) tablet 20 mg  Nightly         05/09/22 2259 05/09/22 2345  hydroxychloroquine (PLAQUENIL) tablet 100 mg  2 Times Daily         05/09/22 2300    05/09/22 2257  traMADol (ULTRAM) tablet 25 mg  Every 6 Hours PRN         05/09/22 2259 05/09/22 2230  Magnesium Sulfate 2 gram infusion- Mg 1.6 - 1.9 mg/dL  Once,    Status:  Discontinued         05/09/22 2135 05/09/22 2130  Magnesium Sulfate 2 gram infusion- Mg 1.6 - 1.9 mg/dL  Once         05/09/22 2031 05/09/22 2100  sodium chloride 0.9 % flush 10 mL  Every 12 Hours Scheduled         05/09/22 2027 05/09/22 2047  Diet Regular; Cardiac, Renal, Consistent Carbohydrate  Diet Effective Now         05/09/22 2046 05/09/22 2037  ECG 12 Lead  STAT         05/09/22 2036 05/09/22 2029  sodium chloride 0.9 % infusion  Continuous,   Status:  Discontinued         05/09/22 2027 05/09/22 2028  Notify Physician (with Parameters)  Until Discontinued         05/09/22 2027 05/09/22 2028  Oxygen Therapy- Nasal Cannula; Titrate for SPO2: 90% - 95%  Continuous         05/09/22 2027 05/09/22 2028  Insert Peripheral IV  Once         05/09/22 2027 05/09/22 2028  Saline Lock & Maintain IV Access  Continuous,   Status:  Canceled         05/09/22 2027 05/09/22 2028  VTE Prophylaxis Not Indicated: Other: Patient Currently Anticoagulated / Receiving Prophylaxis  Once         05/09/22 2027 05/09/22 2028  Telemetry - Maintain IV Access  Continuous         05/09/22 2027 05/09/22 2028  Continuous Cardiac Monitoring  Continuous,   Status:  Canceled         05/09/22 2027 05/09/22 2028  May Be Off Telemetry for Tests  Continuous         05/09/22 2027 05/09/22 2028  ACLS Protocol For Life Threatening Dysrhythmias (Unless Code Status Indicates Otherwise)  Continuous         05/09/22 2027 05/09/22 2028  Notify Provider if ACLS Protocol Activated  Until Discontinued         05/09/22 2027 05/09/22 2028  Pulse Oximetry, Continuous  Continuous         05/09/22 2027 05/09/22 2028  Cardiac Monitoring  Continuous         05/09/22 2027 05/09/22 2028  Activity - Bed Rest With Exceptions (Specify)  Until Discontinued         05/09/22 2027 05/09/22 2028  Daily Weights  Daily       05/09/22 2027 05/09/22 2028  Fall Precautions  Continuous         05/09/22 2027     "05/09/22 2028  Diet Regular; Cardiac, Renal  Diet Effective Now,   Status:  Canceled         05/09/22 2027 05/09/22 2028  Inpatient Cardiology Consult  Once        Specialty:  Cardiology  Provider:  Randi Umana MD    05/09/22 2027 05/09/22 2027  sodium chloride 0.9 % flush 10 mL  As Needed         05/09/22 2027 05/09/22 2027  Telemetry - Pulse Oximetry  Continuous PRN,   Status:  Canceled      Comments: If Patient Develops Unresponsiveness, Acute Dyspnea, Cyanosis or Suspected Hypoxemia Start Continuous Pulse Ox Monitoring, Apply Oxygen & Notify Provider    05/09/22 2027 05/09/22 2027  nitroglycerin (NITROSTAT) SL tablet 0.4 mg  Every 5 Minutes PRN         05/09/22 2027 05/09/22 2027  Initiate Electrolyte Replacement Protocol  Until Discontinued         05/09/22 2026 05/09/22 2027  Patient Currently On Electrolyte Replacement Protocol - Please Refer to MAR for Protocol Details  Misc Nursing Order (Specify)  Daily      Comments: Patient Currently On Electrolyte Replacement Protocol - Please Refer to MAR for Protocol Details    05/09/22 2026 05/09/22 2026  magnesium sulfate 4 gram infusion - Mg less than or equal to 1mg/dL  As Needed        \"Or\" Linked Group Details    05/09/22 2026 05/09/22 2026  magnesium sulfate 3 gram infusion (1gm x 3) - Mg 1.1 - 1.5 mg/dL  As Needed        \"Or\" Linked Group Details    05/09/22 2026 05/09/22 2026  Magnesium Sulfate 2 gram infusion- Mg 1.6 - 1.9 mg/dL  As Needed        \"Or\" Linked Group Details    05/09/22 2026 05/09/22 1926  TSH  STAT         05/09/22 1925 05/09/22 1926  Hemoglobin A1c  STAT         05/09/22 1925 05/09/22 1924  Code Status and Medical Interventions:  Continuous         05/09/22 1925 05/09/22 1923  Inpatient Admission  Once         05/09/22 1925 05/09/22 1922  Magnesium  STAT         05/09/22 1921 05/09/22 1922  BNP  STAT         05/09/22 1921 05/09/22 1858  acetaminophen (TYLENOL) tablet 1,000 mg  Once         " 05/09/22 1856    05/09/22 1808  COVID-19 and FLU A/B PCR - Swab, Nasopharynx  Once         05/09/22 1807    05/09/22 1726  heparin (porcine) 5000 UNIT/ML injection 3,900 Units  Once         05/09/22 1725    05/09/22 1726  heparin 03803 units/250 mL (100 units/mL) in 0.9% NaCl infusion  Titrated         05/09/22 1725    05/09/22 1725  dilTIAZem (CARDIZEM) 125 mg in 125 mL 0.7% sodium chloride  infusion  Continuous         05/09/22 1723    05/09/22 1725  diphenhydrAMINE (BENADRYL) injection 25 mg  Once         05/09/22 1723    05/09/22 1725  Initiate Heparin Protocol  Until Discontinued         05/09/22 1725    05/09/22 1725  Heparin Nomogram / Protocol Adjustment  Once         05/09/22 1725    05/09/22 1725  Notify Provider Platelet Count Less Than 37338  Until Discontinued         05/09/22 1725    05/09/22 1725  Notify Provider if PTT Not in Therapeutic Range After 24 Hours  Until Discontinued         05/09/22 1725    05/09/22 1725  Stop Infusion & Notify Provider if Bleeding Occurs  Until Discontinued         05/09/22 1725    05/09/22 1723  dilTIAZem (CARDIZEM) bolus from bag 1 mg/mL 10 mg  Once         05/09/22 1723    05/09/22 1706  sodium chloride 0.9 % bolus 500 mL  Once         05/09/22 1704    05/09/22 1615  aPTT  Once         05/09/22 1614    05/09/22 1615  Protime-INR  Once         05/09/22 1614    05/09/22 1555  Cardiac monitoring  Continuous,   Status:  Canceled         05/09/22 1554    05/09/22 1446  aspirin chewable tablet 324 mg  Once         05/09/22 1444    05/09/22 1445  Urinalysis With Microscopic If Indicated (No Culture) - Urine, Clean Catch  Once         05/09/22 1444    05/09/22 1444  ECG 12 Lead  Once         05/09/22 1444    05/09/22 1444  XR Chest 1 View  1 Time Imaging         05/09/22 1444    05/09/22 1444  [place] Draw  Once         05/09/22 1444    05/09/22 1444  CBC & Differential  Once         05/09/22 1444    05/09/22 1444  Comprehensive Metabolic Panel  Once         05/09/22 1444     05/09/22 1444  Troponin  Now Then Every 2 Hours       05/09/22 1444    05/09/22 1444  Green Top (Gel)  PROCEDURE ONCE         05/09/22 1444    05/09/22 1444  Lavender Top  PROCEDURE ONCE         05/09/22 1444    05/09/22 1444  Gold Top - SST  PROCEDURE ONCE         05/09/22 1444    05/09/22 1444  Light Blue Top  PROCEDURE ONCE         05/09/22 1444    05/09/22 1444  CBC Auto Differential  PROCEDURE ONCE         05/09/22 1444    Unscheduled  aPTT  As Needed       05/09/22 1725    Unscheduled  RN To Release aPTT Order 6 Hours After Heparin Bolus & 6 Hours After Any Heparin Rate Change  As Needed       05/09/22 1725    Unscheduled  After 2 Consecutive Therapeutic aPTTs, Obtain aPTT Daily.  If a Rate Adjustment is Necessary, Resume Every 6 Hour aPTT Draws  As Needed       05/09/22 1725    Unscheduled  Oxygen Therapy- Nasal Cannula; Titrate for SPO2: 90% - 95%  Continuous PRN      Comments: If Patient Develops Unresponsiveness, Acute Dyspnea, Cyanosis or Suspected Hypoxemia Start Continuous Pulse Ox Monitoring, Apply Oxygen & Notify Provider    05/09/22 2027    Unscheduled  ECG 12 Lead  As Needed      Comments: Nurse to Release if Patient Expericences Acute Chest Pain or Dysrhythmias    05/09/22 2027    Unscheduled  Potassium  As Needed      Comments: For Ventricular Arrhythmias      05/09/22 2027    Unscheduled  Magnesium  As Needed      Comments: For Ventricular Arrhythmias      05/09/22 2027    Unscheduled  Troponin  As Needed      Comments: For Chest Pain      05/09/22 2027    Unscheduled  Blood Gas, Arterial -With Co-Ox Panel: Yes  As Needed      Comments: Per O2 PolicyNotify Physician      05/09/22 2027    Unscheduled  Magnesium  As Needed       05/09/22 2026    Unscheduled  Potassium  As Needed       05/09/22 2026    --  carvedilol (COREG) 25 MG tablet  2 Times Daily With Meals         05/09/22 2232    --  rosuvastatin (CRESTOR) 20 MG tablet  Nightly         05/09/22 2233    --  spironolactone (ALDACTONE) 25 MG  "tablet  Daily         05/09/22 2236    --  SCANNED - TELEMETRY           05/09/22 0000    --  SCANNED - TELEMETRY           05/09/22 0000    --  SCANNED - TELEMETRY           05/09/22 0000                   Physician Progress Notes (last 24 hours)      Vargas Almaguer Jr., MD at 05/10/22 0801              [facility]    S: Patient seen and examined.  States she feels well.  No new complaints    O: /70 (BP Location: Right arm, Patient Position: Lying)   Pulse 72   Temp 97.6 °F (36.4 °C) (Axillary)   Resp 13   Ht 167.6 cm (66\")   Wt 67.1 kg (147 lb 14.4 oz)   SpO2 97%   BMI 23.87 kg/m²     GENERAL:  age appropriate, NAD  EARS,NOSE, MOUTH, THROAT:  MMM, hearing intact  EYES: PERRL, EOMI  CV:  nl s1/s2  RESPIRATORY:  CTAB no w/c/r  GI:  S/NT/ND +BS  SKIN: No rash or lesions. No nodules.  INT: No edema. No joint deformity.  PSYCH:  Normal affect, A+O x 3  NEURO: Alert and oriented, grossly non focal.      Scheduled Meds:carvedilol, 12.5 mg, Oral, BID With Meals  cetirizine, 5 mg, Oral, Daily  escitalopram, 10 mg, Oral, Nightly  hydroxychloroquine, 100 mg, Oral, BID  pantoprazole, 40 mg, Oral, Q AM  rosuvastatin, 20 mg, Oral, Nightly  sodium chloride, 10 mL, Intravenous, Q12H      Continuous Infusions:dilTIAZem, 5 mg/hr, Last Rate: Stopped (05/09/22 2106)  heparin, 12 Units/kg/hr, Last Rate: 11 Units/kg/hr (05/10/22 0707)      PRN Meds:.magnesium sulfate **OR** magnesium sulfate in D5W 1g/100mL (PREMIX) **OR** magnesium sulfate  •  nitroglycerin  •  sodium chloride  •  traMADol    Labs: Laboratory information reviewed and reconciled.  Results from last 7 days   Lab Units 05/10/22  0012   WBC 10*3/mm3 6.01       Results from last 7 days   Lab Units 05/10/22  0012   CO2 mmol/L 23.7   BUN mg/dL 25*   CREATININE mg/dL 1.36*   GLUCOSE mg/dL 124*       Results from last 7 days   Lab Units 05/09/22  1553   INR  0.96           Imaging (last 24 " hours):  [unfilled]    Assessment/Plan:  # New onset afib w/ RVR  - cardizem weaned off this AM, remains rate controlled  - cardio to evaluate  - TTE  - monitor on tele    # CKD stage III  - serial labs  - renally dose meds    # Chronic diastolic HF  - monitor volume status  - continue home regimen    # Prediabetes  - outpatient follow up      DVT Prophylaxis:heparin  Disposition: Continue to monitor  Discharge disposition: Likely home when ok with cars  Prognosis: fair    Vargas Almaguer Jr, MD  [facility] Hospitalist  05/10/22  08:01 EDT    Electronically signed by Vargas Almaguer Jr., MD at 05/10/22 0803       Consult Notes (last 24 hours)  Notes from 05/09/22 0854 through 05/10/22 0854   No notes of this type exist for this encounter.

## 2022-05-11 ENCOUNTER — READMISSION MANAGEMENT (OUTPATIENT)
Dept: CALL CENTER | Facility: HOSPITAL | Age: 82
End: 2022-05-11

## 2022-05-11 VITALS
WEIGHT: 142.8 LBS | HEIGHT: 66 IN | TEMPERATURE: 97.9 F | HEART RATE: 71 BPM | SYSTOLIC BLOOD PRESSURE: 114 MMHG | OXYGEN SATURATION: 96 % | RESPIRATION RATE: 18 BRPM | BODY MASS INDEX: 22.95 KG/M2 | DIASTOLIC BLOOD PRESSURE: 59 MMHG

## 2022-05-11 PROBLEM — I48.92 ATRIAL FLUTTER WITH RAPID VENTRICULAR RESPONSE (HCC): Status: RESOLVED | Noted: 2022-05-09 | Resolved: 2022-05-11

## 2022-05-11 LAB — APTT PPP: 63.7 SECONDS (ref 26.5–34.5)

## 2022-05-11 PROCEDURE — 99232 SBSQ HOSP IP/OBS MODERATE 35: CPT | Performed by: SPECIALIST

## 2022-05-11 PROCEDURE — 25010000002 DIGOXIN PER 500 MCG: Performed by: NURSE PRACTITIONER

## 2022-05-11 PROCEDURE — 85730 THROMBOPLASTIN TIME PARTIAL: CPT | Performed by: SPECIALIST

## 2022-05-11 PROCEDURE — 99239 HOSP IP/OBS DSCHRG MGMT >30: CPT | Performed by: INTERNAL MEDICINE

## 2022-05-11 RX ORDER — DIGOXIN 0.25 MG/ML
250 INJECTION INTRAMUSCULAR; INTRAVENOUS ONCE
Status: COMPLETED | OUTPATIENT
Start: 2022-05-11 | End: 2022-05-11

## 2022-05-11 RX ORDER — DIGOXIN 125 MCG
125 TABLET ORAL
Status: DISCONTINUED | OUTPATIENT
Start: 2022-05-12 | End: 2022-05-11 | Stop reason: HOSPADM

## 2022-05-11 RX ORDER — DIGOXIN 125 MCG
125 TABLET ORAL
Qty: 30 TABLET | Refills: 0 | Status: SHIPPED | OUTPATIENT
Start: 2022-05-12 | End: 2022-05-25 | Stop reason: ALTCHOICE

## 2022-05-11 RX ADMIN — Medication 10 ML: at 08:47

## 2022-05-11 RX ADMIN — DIGOXIN 250 MCG: 0.25 INJECTION INTRAMUSCULAR; INTRAVENOUS at 10:37

## 2022-05-11 RX ADMIN — CETIRIZINE HYDROCHLORIDE 5 MG: 10 TABLET, FILM COATED ORAL at 08:47

## 2022-05-11 RX ADMIN — CARVEDILOL 25 MG: 25 TABLET, FILM COATED ORAL at 08:46

## 2022-05-11 RX ADMIN — PANTOPRAZOLE SODIUM 40 MG: 40 TABLET, DELAYED RELEASE ORAL at 06:29

## 2022-05-11 RX ADMIN — HYDROXYCHLOROQUINE SULFATE 100 MG: 200 TABLET ORAL at 08:47

## 2022-05-11 RX ADMIN — APIXABAN 5 MG: 5 TABLET, FILM COATED ORAL at 10:38

## 2022-05-11 NOTE — PROGRESS NOTES
LOS: 2 days     Name: Kelsey Avendano  Age/Sex: 82 y.o. female  :  1940        PCP: Sudhir Arita MD  REF: No ref. provider found    Active Problems:    Atrial flutter with rapid ventricular response (HCC)      Reason for follow-up: New onset atrial flutter     Subjective     Subjective     Kelsey Avendano is a 82-year-old female with a past medical history significant for lupus, TIA/stroke, history of diastolic heart failure, chronic kidney disease stage III, hypertension and bradycardia.  Patient presents to the ER with complaints of palpitations.     Interval History: Patient remains in atrial flutter with heart rates fluctuating between  bpm.  Heart rate appears controlled when patient is sitting but does worsen with exertion.  States she is feeling okay today.  Denies any chest pain or palpitations.    Vital Signs  Temp:  [97.8 °F (36.6 °C)-98.4 °F (36.9 °C)] 98.1 °F (36.7 °C)  Heart Rate:  [] 71  Resp:  [16-18] 18  BP: (113-173)/(57-88) 149/60     Vital Signs (last 72 hrs)        0700   0659  0700  05/10 0659 05/10 07 0659  07 0904   Most Recent      Temp (°F)   97.6 -  98    97.8 -  98.4       98.1 (36.7)  0646    Heart Rate   60 -  132    68 -  106       71  0646    Resp   13 -  25    14 -  18       18  0646    BP   108/65 -  164/76    113/57 -  173/88       149/60  0646    SpO2 (%)   86 -  100    94 -  98       98  0646        Documented weights    22 1443 22 2044 05/10/22 0500 05/10/22 1603   Weight: 65.8 kg (145 lb) 67.1 kg (147 lb 14.4 oz) 67.1 kg (147 lb 14.4 oz) 65.3 kg (144 lb)    22 0500   Weight: 64.8 kg (142 lb 12.8 oz)      Body mass index is 23.05 kg/m².    Intake/Output Summary (Last 24 hours) at 2022 0904  Last data filed at 5/10/2022 1300  Gross per 24 hour   Intake 360 ml   Output --   Net 360 ml     Objective    Objective       Physical Exam:     General Appearance:    Alert,  cooperative, in no acute distress   Head:    Normocephalic, without obvious abnormality, atraumatic   Eyes:            Conjunctivae and sclerae normal, no   icterus, no pallor, corneas clear.   Neck:   No adenopathy, supple, trachea midline, no thyromegaly, no   carotid bruit, no JVD   Lungs:     Clear to auscultation,respirations regular, even and                  unlabored    Heart:    irregular rhythm and normal rate, normal S1 and S2, no            murmur, no gallop, no rub, no click   Chest Wall:    No abnormalities observed   Abdomen:     Normal bowel sounds, no masses, no organomegaly, soft        nontender, nondistended, no guarding, no rebound                tenderness   Extremities:   Moves all extremities well, no edema, no cyanosis, no             redness   Pulses:   Pulses palpable and equal bilaterally   Skin:   No bleeding, bruising or rash       Neurologic:   Alert and oriented      Results review       Results Review:   Results from last 7 days   Lab Units 05/10/22  0012 05/09/22  1553   WBC 10*3/mm3 6.01 6.62   HEMOGLOBIN g/dL 11.4* 12.4   PLATELETS 10*3/mm3 143 184     Results from last 7 days   Lab Units 05/10/22  0012 05/09/22  1553   SODIUM mmol/L 137 137   POTASSIUM mmol/L 3.8 4.4   CHLORIDE mmol/L 101 99   CO2 mmol/L 23.7 25.9   BUN mg/dL 25* 26*   CREATININE mg/dL 1.36* 1.40*   CALCIUM mg/dL 8.9 9.5   GLUCOSE mg/dL 124* 107*   ALT (SGPT) U/L 20 22   AST (SGOT) U/L 39* 43*     Results from last 7 days   Lab Units 05/10/22  0601 05/10/22  0012 05/09/22  1750 05/09/22  1553   TROPONIN T ng/mL <0.010 <0.010 <0.010 <0.010     Lab Results   Component Value Date    INR 0.96 05/09/2022     Lab Results   Component Value Date    MG 1.6 05/09/2022    MG 1.8 09/25/2019    MG 1.8 09/25/2019     Lab Results   Component Value Date    TSH 2.000 05/09/2022    TRIG 104 05/10/2022    HDL 48 05/10/2022    LDL 39 05/10/2022      Imaging Results (Last 48 Hours)     Procedure Component Value Units Date/Time    XR  Chest 1 View [[id number]] Collected: 05/09/22 1532     Updated: 05/09/22 1534    Narrative:      EXAM:    XR Chest, 1 View     EXAM DATE:    5/9/2022 2:55 PM     CLINICAL HISTORY:    Chest pain protocol     TECHNIQUE:    Frontal view of the chest.     COMPARISON:    02/11/2019     FINDINGS:    LUNGS:  Coarsened interstitial markings noted throughout the lungs.    PLEURAL SPACE:  Unremarkable.  No pneumothorax.    HEART: Heart size is stable.    MEDIASTINUM:  Unremarkable.    BONES/JOINTS:  Unremarkable.       Impression:        No acute cardiopulmonary findings identified.     This report was finalized on 5/9/2022 3:32 PM by Dr. Hugh Lorenzo MD.           Echo   Results for orders placed during the hospital encounter of 05/09/22    Adult Transthoracic Echo Complete w/ Color, Spectral and Contrast if necessary per protocol    Interpretation Summary  · Left ventricular wall thickness is consistent with mild concentric hypertrophy.  · Left ventricular ejection fraction appears to be 61 - 65%. Left ventricular systolic function is normal.  · Left ventricular diastolic function is consistent with (grade Ia w/high LAP) impaired relaxation.  · Left atrial volume is moderately increased.  · The right atrial cavity is moderately dilated.  · Moderate mitral valve regurgitation is present.  · Moderate tricuspid valve regurgitation is present.  · Estimated right ventricular systolic pressure from tricuspid regurgitation is mildly elevated (35-45 mmHg).     I reviewed the patient's new clinical results.    Telemetry: Atrial flutter  bpm      Medication Review:   carvedilol, 25 mg, Oral, BID With Meals  cetirizine, 5 mg, Oral, Daily  escitalopram, 10 mg, Oral, Nightly  hydroxychloroquine, 100 mg, Oral, BID  pantoprazole, 40 mg, Oral, Q AM  rosuvastatin, 20 mg, Oral, Nightly  sodium chloride, 10 mL, Intravenous, Q12H        dilTIAZem, 5 mg/hr, Last Rate: Stopped (05/09/22 2106)  heparin, 12 Units/kg/hr, Last Rate: 11  Units/kg/hr (05/10/22 1350)        Assessment      Assessment:  1. New onset atrial flutter with RVR and variable block  2. Moderate mitral and tricuspid valve reinitiation  3. Mild aortic regurgitation  4. Moderate biatrial enlargement  5. History of TIA/stroke  6. Chronic kidney disease stage III  7. Essential hypertension          Plan     Recommendations:  1. Patient is better rate controlled but still getting a little bit tachycardic on standing we will add digoxin to try to get better rate control  2.  Mostly digoxin level as an outpatient  3.  Starting Eliquis 5 mg twice daily and plan for outpatient cardioversion in 3 to 4 weeks of anticoagulation  4.  Patient can be discharged later today if her heart rate is controlled      I discussed the patient's findings and my recommendations with patient and family    Electronically signed by DORIS Bonilla, 05/11/22, 9:05 AM EDT.  Electronically signed by Randi Umana MD, 05/11/22, 11:54 AM EDT.    Please note that portions of this note were completed with a voice recognition program.

## 2022-05-11 NOTE — DISCHARGE SUMMARY
"      [facility] HOSPITALIST DISCHARGE SUMMARY    Admit date: 5/9/2022    Discharge date and time: 05/11/22    Admitting Physician: Lui Abdul MD    Discharge Physician: Vargas Almaguer    Admission Diagnoses: Atrial flutter with rapid ventricular response (HCC) [I48.92]    Discharge Diagnoses: Atrial flutter with RVR    Admission Condition: fair    Discharged Condition: fair    Indication for Admission: Cardio eval, rate control    Hospital Course: Patient is a 82 y.o. female with history of aortic insufficiency, arthritis, lupus, TIA/stroke, diastolic CHF on lasix PRN, stage III CKD, HTN, anxiety, depression, and bradycardia, who presents with complaints of waking up this morning with her heart racing. She denies any significant chest pain, only palpitations. She reports that she had an \"exhausting weekend\" with many family in town for Mother's Day which she felt wore her down. She also reports that last night she was feeling a little congested in her chest and was coughing more, so took a lasix pill for this. She denies any lower extremity edema, however. Upon further questioning, she takes magnesium replacement but has been out of it for the last month.      Patient was admitted and started on diltiazem gtt. for rate control.  She was monitored on telemetry.  Her rate was controlled and she was transition back to home beta-blocker and digoxin was added.  Patient has been rate controlled since that time and asymptomatic..  She was evaluated by cardiology who recommended anticoagulation.  Patient was on heparin GTT and will be discharged on Eliquis.  Patient to continue anticoagulation and follow-up with cardiology for possible cardioversion in 4 weeks.  Patient will follow-up with PCP as an outpatient.  She will need a digoxin level in approximately 1 week. She is requesting discharge at this time.  Discussed plan with patient and she is in agreement.    Consults: cardiology    Significant " "Diagnostic Studies: As above    Treatments: As above    Discharge Exam:  /60 (BP Location: Left arm, Patient Position: Lying)   Pulse 71   Temp 98.1 °F (36.7 °C) (Oral)   Resp 18   Ht 167.6 cm (66\")   Wt 64.8 kg (142 lb 12.8 oz)   SpO2 98%   BMI 23.05 kg/m²   General appearance: alert, appears stated age and cooperative  Lungs: clear to auscultation bilaterally  Heart: irregularly irregular rhythm and S1, S2 normal  Abdomen: soft, non-tender; bowel sounds normal; no masses,  no organomegaly  Neurologic: Grossly normal    Disposition: Home or Self Care    Patient Instructions:      Discharge Medications            New Medications      Instructions Start Date   apixaban 5 MG tablet tablet  Commonly known as: ELIQUIS    5 mg, Oral, Every 12 Hours Scheduled        Digoxin 125mcg daily         Continue These Medications      Instructions Start Date   carvedilol 25 MG tablet  Commonly known as: COREG    25 mg, Oral, 2 Times Daily With Meals        cetirizine 10 MG tablet  Commonly known as: zyrTEC    10 mg, Oral, Daily        escitalopram 20 MG tablet  Commonly known as: LEXAPRO    20 mg, Oral, Nightly        furosemide 20 MG tablet  Commonly known as: LASIX    10 mg, Oral, Nightly PRN        hydroxychloroquine 200 MG tablet  Commonly known as: PLAQUENIL    200 mg, Oral, 2 Times Daily        pantoprazole 40 MG EC tablet  Commonly known as: PROTONIX    40 mg, Oral, 2 times daily        rosuvastatin 20 MG tablet  Commonly known as: CRESTOR    20 mg, Oral, Nightly        spironolactone 25 MG tablet  Commonly known as: ALDACTONE    25 mg, Oral, Daily        traMADol 50 MG tablet  Commonly known as: ULTRAM    50 mg, Oral, Every 6 Hours PRN              Activity: activity as tolerated  Diet: cardiac diet  Wound Care: none needed    Follow-up with PCP in 1 week and cardiology in 2 weeks.    Discharge process took 33 minutes.      Signed:  Vargas Almaguer Jr, MD  5/11/2022  14:10 EDT     "

## 2022-05-11 NOTE — DISCHARGE SUMMARY
"      [facility] HOSPITALIST DISCHARGE SUMMARY        Patient ID:  Kelsey Avendano  [id number]  82 y.o.  1940    Admit date: 5/9/2022    Discharge date and time: 05/11/22    Admitting Physician: Lui Abdul MD    Discharge Physician: Vargas Almaguer    Admission Diagnoses: Atrial flutter with rapid ventricular response (HCC) [I48.92]    Discharge Diagnoses: Atrial flutter with RVR    Admission Condition: fair    Discharged Condition: fair    Indication for Admission: Cardio eval, rate control    Hospital Course: Patient is a 82 y.o. female with history of aortic insufficiency, arthritis, lupus, TIA/stroke, diastolic CHF on lasix PRN, stage III CKD, HTN, anxiety, depression, and bradycardia, who presents with complaints of waking up this morning with her heart racing. She denies any significant chest pain, only palpitations. She reports that she had an \"exhausting weekend\" with many family in town for Mother's Day which she felt wore her down. She also reports that last night she was feeling a little congested in her chest and was coughing more, so took a lasix pill for this. She denies any lower extremity edema, however. Upon further questioning, she takes magnesium replacement but has been out of it for the last month.     Patient was admitted and started on diltiazem gtt. for rate control.  She was monitored on telemetry.  Her rate was controlled and she was transition back to home beta-blocker which has kept her rate controlled x24 hours.  She was evaluated by cardiology who recommended anticoagulation.  Patient was on heparin GTT and will be discharged on Eliquis.  Patient to continue anticoagulation and follow-up with cardiology for possible cardioversion in 4 weeks.  Patient will follow-up with PCP as an outpatient.  She is requesting discharge at this time.  Discussed plan with patient and she is in agreement.    Consults: cardiology    Significant Diagnostic Studies: As " "above    Treatments: As above    Discharge Exam:  /60 (BP Location: Left arm, Patient Position: Lying)   Pulse 71   Temp 98.1 °F (36.7 °C) (Oral)   Resp 18   Ht 167.6 cm (66\")   Wt 64.8 kg (142 lb 12.8 oz)   SpO2 98%   BMI 23.05 kg/m²   General appearance: alert, appears stated age and cooperative  Lungs: clear to auscultation bilaterally  Heart: irregularly irregular rhythm and S1, S2 normal  Abdomen: soft, non-tender; bowel sounds normal; no masses,  no organomegaly  Neurologic: Grossly normal    Disposition: Home or Self Care    Patient Instructions:     Discharge Medications      New Medications      Instructions Start Date   apixaban 5 MG tablet tablet  Commonly known as: ELIQUIS   5 mg, Oral, Every 12 Hours Scheduled         Continue These Medications      Instructions Start Date   carvedilol 25 MG tablet  Commonly known as: COREG   25 mg, Oral, 2 Times Daily With Meals      cetirizine 10 MG tablet  Commonly known as: zyrTEC   10 mg, Oral, Daily      escitalopram 20 MG tablet  Commonly known as: LEXAPRO   20 mg, Oral, Nightly      furosemide 20 MG tablet  Commonly known as: LASIX   10 mg, Oral, Nightly PRN      hydroxychloroquine 200 MG tablet  Commonly known as: PLAQUENIL   200 mg, Oral, 2 Times Daily      pantoprazole 40 MG EC tablet  Commonly known as: PROTONIX   40 mg, Oral, 2 times daily      rosuvastatin 20 MG tablet  Commonly known as: CRESTOR   20 mg, Oral, Nightly      spironolactone 25 MG tablet  Commonly known as: ALDACTONE   25 mg, Oral, Daily      traMADol 50 MG tablet  Commonly known as: ULTRAM   50 mg, Oral, Every 6 Hours PRN           Activity: activity as tolerated  Diet: cardiac diet  Wound Care: none needed    Follow-up with PCP in 1 week and cardiology in 2 weeks.    Discharge process took 33 minutes.    Signed:  Vargas Almaguer Jr, MD  5/11/2022  09:28 EDT     "

## 2022-05-11 NOTE — PLAN OF CARE
Goal Outcome Evaluation:   Patient was resting in bed. He HR did get up to the 140's briefly. Patient was asymptomatic. She had just received her Coreg.

## 2022-05-11 NOTE — PROGRESS NOTES
"    [facility] HOSPITALIST PROGRESS NOTE    S: Patient seen and examined.  States she feels well.  Heart rate up to 120s with exertion.  Otherwise no new complaints.  No chest pain or shortness of breath.    O: /60 (BP Location: Left arm, Patient Position: Lying)   Pulse 71   Temp 98.1 °F (36.7 °C) (Oral)   Resp 18   Ht 167.6 cm (66\")   Wt 64.8 kg (142 lb 12.8 oz)   SpO2 98%   BMI 23.05 kg/m²     GENERAL:  age appropriate, NAD  EARS,NOSE, MOUTH, THROAT:  MMM, hearing intact  EYES: PERRL, EOMI  CV:  nl s1/s2  RESPIRATORY:  CTAB no w/c/r  GI:  S/NT/ND +BS  SKIN: No rash or lesions. No nodules.  INT: No edema. No joint deformity.  PSYCH:  Normal affect, A+O x 3  NEURO: Alert and oriented, grossly non focal.      Scheduled Meds:apixaban, 5 mg, Oral, Q12H  carvedilol, 25 mg, Oral, BID With Meals  cetirizine, 5 mg, Oral, Daily  digoxin, 250 mcg, Intravenous, Once  escitalopram, 10 mg, Oral, Nightly  hydroxychloroquine, 100 mg, Oral, BID  pantoprazole, 40 mg, Oral, Q AM  rosuvastatin, 20 mg, Oral, Nightly  sodium chloride, 10 mL, Intravenous, Q12H      Continuous Infusions:   PRN Meds:.magnesium sulfate **OR** magnesium sulfate in D5W 1g/100mL (PREMIX) **OR** magnesium sulfate  •  nitroglycerin  •  sodium chloride  •  traMADol    Labs: Laboratory information reviewed and reconciled.    Results from last 7 days   Lab Units 05/10/22  0012   WBC 10*3/mm3 6.01         Results from last 7 days   Lab Units 05/10/22  0012   CO2 mmol/L 23.7   BUN mg/dL 25*   CREATININE mg/dL 1.36*   GLUCOSE mg/dL 124*         Results from last 7 days   Lab Units 05/09/22  1553   INR  0.96           Imaging (last 24 hours):  [unfilled]    Assessment/Plan:  # New onset afib w/ RVR  - cardizem weaned off this AM, remains rate controlled at rest with elevated heart rate on exertion.  Currently on carvedilol.  -Discussed with cardiology will try dig this morning and if patient's heart rate stays controlled at the day can " discharge later this afternoon.  We will order a dig level at 1 week after discharge.  - cardio to evaluate  - TTE: EF 60 to 65% with normal systolic function  - monitor on tele  -transition to Eliquis    # CKD stage III  - serial labs  - renally dose meds    # Chronic diastolic HF  - monitor volume status  - continue home regimen    # Prediabetes  - outpatient follow up      DVT Prophylaxis: Eliquis  Disposition: Continue to monitor  Discharge disposition: Likely home when ok with cars  Prognosis: fair    Vargas Almaguer Jr, MD  [facility]ist  05/11/22  09:43 EDT

## 2022-05-11 NOTE — PLAN OF CARE
Goal Outcome Evaluation:   Patient resting well during shift. She has no complaints or concerns at this time. No distress noted. Plan of care reviewed with the patient she is cooperative.

## 2022-05-11 NOTE — DISCHARGE INSTR - APPOINTMENTS
Follow up appt. with Dr. Umana is May 18th  @ 1:20pm.    Follow up appt. May 17th  @ 10:15am with Robert Angeles.

## 2022-05-12 ENCOUNTER — READMISSION MANAGEMENT (OUTPATIENT)
Dept: CALL CENTER | Facility: HOSPITAL | Age: 82
End: 2022-05-12

## 2022-05-12 NOTE — OUTREACH NOTE
Prep Survey    Flowsheet Row Responses   [facility] facility patient discharged from? [facility]   Is LACE score < 7 ? No   Emergency Room discharge w/ pulse ox? No   Eligibility Readm Mgmt   Discharge diagnosis Atrial flutter with rapid ventricular response    Does the patient have one of the following disease processes/diagnoses(primary or secondary)? Other   Does the patient have Home health ordered? No   Is there a DME ordered? No   Medication alerts for this patient Eliquis, Digoxin    Prep survey completed? Yes          HILDA ESPOSITO - Registered Nurse

## 2022-05-12 NOTE — OUTREACH NOTE
Medical Week 1 Survey    Flowsheet Row Responses   [facility] patient discharged from? [facility]   Does the patient have one of the following disease processes/diagnoses(primary or secondary)? Other   Week 1 attempt successful? Yes   Call start time 0940   Call end time 0943   Discharge diagnosis Atrial flutter with rapid ventricular response    Is patient permission given to speak with other caregiver? Yes   List who call center can speak with Reinier Ratliff reviewed with patient/caregiver? Yes   Is the patient having any side effects they believe may be caused by any medication additions or changes? No   Does the patient have all medications ordered at discharge? Yes   Is the patient taking all medications as directed (includes completed medication regime)? Yes   Does the patient have a primary care provider?  Yes   Does the patient have an appointment with their PCP within 7 days of discharge? Yes   Comments regarding PCP Dr. Arita will see APRN 05/17/2022   Has home health visited the patient within 72 hours of discharge? N/A   Psychosocial issues? No   Did the patient receive a copy of their discharge instructions? Yes   Nursing interventions Reviewed instructions with patient   What is the patient's perception of their health status since discharge? Improving   Is the patient/caregiver able to teach back signs and symptoms related to disease process for when to call PCP? Yes   Is the patient/caregiver able to teach back signs and symptoms related to disease process for when to call 911? Yes   Is the patient/caregiver able to teach back the hierarchy of who to call/visit for symptoms/problems? PCP, Specialist, Home health nurse, Urgent Care, ED, 911 Yes   If the patient is a current smoker, are they able to teach back resources for cessation? Not a smoker   Week 1 call completed? Yes   Wrap up additional comments Pt states she is doing well, still tired and is resting in bed right now. No needs identified.            LANA REARDON - Registered Nurse

## 2022-05-18 ENCOUNTER — OFFICE VISIT (OUTPATIENT)
Dept: CARDIOLOGY | Facility: CLINIC | Age: 82
End: 2022-05-18

## 2022-05-18 VITALS
WEIGHT: 146 LBS | SYSTOLIC BLOOD PRESSURE: 124 MMHG | TEMPERATURE: 97.3 F | BODY MASS INDEX: 23.46 KG/M2 | HEART RATE: 60 BPM | HEIGHT: 66 IN | OXYGEN SATURATION: 97 % | DIASTOLIC BLOOD PRESSURE: 59 MMHG

## 2022-05-18 DIAGNOSIS — I10 ESSENTIAL HYPERTENSION: ICD-10-CM

## 2022-05-18 DIAGNOSIS — G47.30 SLEEP APNEA, UNSPECIFIED TYPE: ICD-10-CM

## 2022-05-18 DIAGNOSIS — I48.92 PAROXYSMAL ATRIAL FLUTTER: Primary | ICD-10-CM

## 2022-05-18 PROCEDURE — 99214 OFFICE O/P EST MOD 30 MIN: CPT | Performed by: NURSE PRACTITIONER

## 2022-05-18 PROCEDURE — 93000 ELECTROCARDIOGRAM COMPLETE: CPT | Performed by: NURSE PRACTITIONER

## 2022-05-18 NOTE — PROGRESS NOTES
Sudhir Arita MD  Kelsey Avendano  1940 05/18/2022    Patient Active Problem List   Diagnosis   • Annual GYN exam w/o problem   • Essential hypertension   • Depression   • Systemic lupus erythematosus (HCC)   • Osteoporosis   • Gastroesophageal reflux disease   • Cystocele with rectocele   • TIA (transient ischemic attack)   • Palpitations   • Mild mitral regurgitation   • Non-rheumatic tricuspid valve insufficiency   • Acute on chronic diastolic CHF (congestive heart failure) ([facility])   • Moderate aortic insufficiency   • COVID-19   • Paroxysmal atrial flutter (HCC)       Dear Sudhir Arita MD:    Subjective     Chief Complaint   Patient presents with   • Med Management     List     Problem List:  1. 1. TIA; on aspirin (not daily); right sided visual deficit:  a. MRI, 02/28/2018: Age-appropriate diffuse generalized cerebral atrophy with gliosis in the white matter and ill defined oat infarct in the cerebellum on the left with no acute areas of ischemia  b. MRA, 02/28/2018: Neck, slightly tortuous vessels consistent with atherosclerotic changes and no focal areas of stenosis. Antegrade flow noted bilaterally.  c. Echocardiogram, 02/28/2018: EF 60-65%, grade 2 diastolic dysfunction with mildly dilated right atrial cavity is moderate pulmonary hypertension.  d. Holter 3/2018: Normal sinus rhythm/sinus bradycardia at 58 bpm.  e. JEANETTE, 11/06/2018: EF 60%. Moderate AI, aortic valve leaflets appear rheumatic. Mild-to-moderate MR/TR. Most likely source of patient's TIA is aortic atheroma.  2. Aortic insufficiency:  a. JEANETTE, 11/06/2018: EF 60%. Moderate AI, aortic valve leaflets appear rheumatic. Mild-to-moderate MR/TR.   b. Echocardiogram, 09/17/2019: EF 60%. Mild-to-mod AI. Trace-to-mild MR. Mild TR.  3. Chronic diastolic heart failure  a. [facility] in 2008: Reportedly normal, data insufficient  4. Hypertension.-Difficult to control with multiple drug intolerances  5. Hyperlipidemia.  6. Hx  "CVA.  7. GERD.  8. Lupus.  9. Surgeries:  a. Partial replacement bilateral knees  10.  New onset atrial flutter diagnosed in May 20, 2022, on Eliquis    History of Present Illness:    Kelsey Avendano is a 82 y.o. female who presents today for hospital follow-up.  Patient was admitted to [facility] for palpitations and was found to be onset of atrial flutter with RVR. During that admission she was placed on Cardizem for rate controlled atrial fibrillation kidney elevated blood pressure and digoxin was added.  She was placed on Eliquis for stroke prevention.  It was recommended patient undergo JEANETTE guided cardioversion however patient opted to continue with anticoagulation for 4 weeks and come back as an outpatient for cardioversion.  Echocardiogram in the admission showed EF of 61 to 65% with atrial cavity moderately dilated, moderate mitral valve regurgitation and moderate tricuspid valve regurgitation.  Patient states since her discharge in the hospital she has been doing much better.  Denies any palpitations, chest pain, shortness of breath or syncope.  Has been tolerating Eliquis well with no bleeding issues.  She has been tolerating digoxin and Coreg at current dosing.  Patient had recent lab work at PCP office which showed a digoxin level of 1.8.  Platelets were also noted to be low.      Allergies   Allergen Reactions   • Maxzide [Hydrochlorothiazide W-Triamterene] Other (See Comments)     Kidney issues 06/30/20     • Hydralazine Hcl Photosensitivity, Palpitations, GI Intolerance and Headache     Chest pains, headache, diarrhea   • Hytrin [Terazosin] Palpitations     Visual issues- \"bright spots in eyes\" 6/2020   • Nifedipine Er Rash     flushing   :      Current Outpatient Medications:   •  apixaban (ELIQUIS) 5 MG tablet tablet, Take 1 tablet by mouth Every 12 (Twelve) Hours for 30 days. Indications: Atrial Fibrillation, Disp: 60 tablet, Rfl: 1  •  carvedilol (COREG) 25 MG tablet, Take 25 mg by " "mouth 2 (Two) Times a Day With Meals., Disp: , Rfl:   •  cetirizine (zyrTEC) 10 MG tablet, Take 10 mg by mouth Daily., Disp: , Rfl:   •  digoxin (LANOXIN) 125 MCG tablet, Take 1 tablet by mouth Daily for 30 days., Disp: 30 tablet, Rfl: 0  •  escitalopram (LEXAPRO) 20 MG tablet, Take 20 mg by mouth Every Night., Disp: , Rfl:   •  furosemide (LASIX) 20 MG tablet, Take 10 mg by mouth At Night As Needed., Disp: , Rfl:   •  hydroxychloroquine (PLAQUENIL) 200 MG tablet, Take 200 mg by mouth 2 (Two) Times a Day., Disp: , Rfl:   •  pantoprazole (PROTONIX) 40 MG EC tablet, Take 40 mg by mouth 2 (two) times a day., Disp: , Rfl:   •  rosuvastatin (CRESTOR) 20 MG tablet, Take 20 mg by mouth Every Night., Disp: , Rfl:   •  spironolactone (ALDACTONE) 25 MG tablet, Take 25 mg by mouth Daily., Disp: , Rfl:   •  traMADol (ULTRAM) 50 MG tablet, Take 50 mg by mouth Every 6 (Six) Hours As Needed for Moderate Pain ., Disp: , Rfl:       The following portions of the patient's history were reviewed today and updated today as appropriate: allergies, current medications, past family history, past medical history, past social history, past surgical history and problem list.    Social History     Tobacco Use   • Smoking status: Never Smoker   • Smokeless tobacco: Never Used   Vaping Use   • Vaping Use: Never used   Substance Use Topics   • Alcohol use: No   • Drug use: No       Objective   Vitals:    05/18/22 1336   BP: 124/59   BP Location: Right arm   Patient Position: Sitting   Cuff Size: Adult   Pulse: 60   Temp: 97.3 °F (36.3 °C)   SpO2: 97%   Weight: 66.2 kg (146 lb)   Height: 167.6 cm (66\")     Body mass index is 23.57 kg/m².    Constitutional:       Appearance: Healthy appearance. Well-developed.   Eyes:      Pupils: Pupils are equal, round, and reactive to light.   HENT:      Head: Normocephalic.   Neck:      Lymphadenopathy: No cervical adenopathy.   Pulmonary:      Effort: Pulmonary effort is normal. No respiratory distress.      " Breath sounds: Normal breath sounds. No wheezing.   Cardiovascular:      Normal rate. Regular rhythm. Normal S1. Normal S2.      . No S3 and S4 gallop.   Pulses:     Intact distal pulses.   Abdominal:      General: Bowel sounds are normal. There is no distension.      Palpations: Abdomen is soft.      Tenderness: There is no abdominal tenderness.   Musculoskeletal:      Cervical back: Normal range of motion. Skin:     General: Skin is warm and dry.      Findings: No erythema.   Neurological:      Mental Status: Alert, oriented to person, place, and time and oriented to person, place and time.   Psychiatric:         Attention and Perception: Attention normal.         Mood and Affect: Mood normal.         Behavior: Behavior normal.         Thought Content: Thought content normal.       Lab Results   Component Value Date     05/10/2022    K 3.8 05/10/2022     05/10/2022    CO2 23.7 05/10/2022    BUN 25 (H) 05/10/2022    CREATININE 1.36 (H) 05/10/2022    GLUCOSE 124 (H) 05/10/2022    CALCIUM 8.9 05/10/2022    AST 39 (H) 05/10/2022    ALT 20 05/10/2022    ALKPHOS 72 05/10/2022     Lab Results   Component Value Date    CKTOTAL 261 (H) 02/28/2018     Lab Results   Component Value Date    WBC 6.01 05/10/2022    HGB 11.4 (L) 05/10/2022    HCT 35.8 05/10/2022     05/10/2022     Lab Results   Component Value Date    INR 0.96 05/09/2022     Lab Results   Component Value Date    MG 1.6 05/09/2022     Lab Results   Component Value Date    TSH 2.000 05/09/2022    TRIG 104 05/10/2022    HDL 48 05/10/2022    LDL 39 05/10/2022       ECG 12 Lead    Date/Time: 5/18/2022 2:44 PM  Performed by: Rajani Salazar APRN  Authorized by: Rajani Salazar APRN   Comparison: compared with previous ECG   Rhythm: sinus rhythm and sinus bradycardia  Rate: normal  Conduction: conduction normal  Other findings: non-specific ST-T wave changes          Assessment & Plan    Diagnosis Plan   1. Paroxysmal atrial flutter  ([facility])  ECG 12 Lead    Home Sleep Study    ECG 12 Lead   2. Sleep apnea, unspecified type   Home Sleep Study   3. Essential hypertension         Recommendations:  1. Atrial flutter  · Patient currently in normal sinus rhythm today in the office.  Noted to be slightly bradycardic at 56 bpm.  Patient states that her family would like for her to follow-up with her cardiologist in [facility] given that her children live there.  Discussed with patient that given she is in normal sinus rhythm she will not need cardioversion at this time however she is welcome to follow-up with her primary cardiologist to discuss further management if needed in the future.  She is scheduled to see Dr. Martinez in a couple of days.  Continue with Eliquis for stroke prevention.  · Digoxin level obtained by her PCP shows elevated level at 1.8 therefore have asked patient to discontinue digoxin and continue with Coreg at current dosing.  Check follow-up digoxin level in a couple of days.  · Will obtain home sleep study evaluate for underlying sleep apnea.    2.  Essential hypertension  · Blood pressure controlled.  Continue with spironolactone and Coreg at current dosing.      BMI is within normal parameters. No other follow-up for BMI required.       No follow-ups on file.    As always, I appreciate very much the opportunity to participate in the cardiovascular care of your patients.      With Best Regards,          DORIS Bonilla

## 2022-05-25 ENCOUNTER — APPOINTMENT (OUTPATIENT)
Dept: RESPIRATORY THERAPY | Facility: HOSPITAL | Age: 82
End: 2022-05-25

## 2022-05-25 ENCOUNTER — APPOINTMENT (OUTPATIENT)
Dept: CT IMAGING | Facility: HOSPITAL | Age: 82
End: 2022-05-25

## 2022-05-25 ENCOUNTER — OFFICE VISIT (OUTPATIENT)
Dept: CARDIOLOGY | Facility: CLINIC | Age: 82
End: 2022-05-25

## 2022-05-25 VITALS
HEART RATE: 56 BPM | HEIGHT: 66 IN | SYSTOLIC BLOOD PRESSURE: 150 MMHG | WEIGHT: 147 LBS | DIASTOLIC BLOOD PRESSURE: 60 MMHG | OXYGEN SATURATION: 98 % | BODY MASS INDEX: 23.63 KG/M2

## 2022-05-25 DIAGNOSIS — I35.1 NONRHEUMATIC AORTIC VALVE INSUFFICIENCY: ICD-10-CM

## 2022-05-25 DIAGNOSIS — I48.92 PAROXYSMAL ATRIAL FLUTTER: Primary | ICD-10-CM

## 2022-05-25 DIAGNOSIS — I10 ESSENTIAL HYPERTENSION: ICD-10-CM

## 2022-05-25 PROCEDURE — 99214 OFFICE O/P EST MOD 30 MIN: CPT | Performed by: INTERNAL MEDICINE

## 2022-05-25 PROCEDURE — 93000 ELECTROCARDIOGRAM COMPLETE: CPT | Performed by: INTERNAL MEDICINE

## 2022-05-25 NOTE — PROGRESS NOTES
Subjective:     Encounter Date:05/25/2022    Primary Care Physician: Sudhir Arita MD      Patient ID: Kelsey Avendano is a 82 y.o. female.    Chief Complaint:Follow-up      Problem List:  1. TIA; on aspirin (not daily); right sided visual deficit:  a. MRI, 02/28/2018: Age-appropriate diffuse generalized cerebral atrophy with gliosis in the white matter and ill defined oat infarct in the cerebellum on the left with no acute areas of ischemia  b. MRA, 02/28/2018: Neck, slightly tortuous vessels consistent with atherosclerotic changes and no focal areas of stenosis. Antegrade flow noted bilaterally.  c. Echocardiogram, 02/28/2018: EF 60-65%, grade 2 diastolic dysfunction with mildly dilated right atrial cavity is moderate pulmonary hypertension.  d. Holter 3/2018: Normal sinus rhythm/sinus bradycardia at 58 bpm.  e. JEANETTE, 11/06/2018: EF 60%. Moderate AI, aortic valve leaflets appear rheumatic. Mild-to-moderate MR/TR. Most likely source of patient's TIA is aortic atheroma.  2. Paroxysmal atrial flutter  1. 5/10/2022 echo EF 61 to 65%.  Right atrium moderately dilated, left atrium moderately volume increased.  Moderate MR, moderate TR.  RVSP 35 to 45 mmHg.  Mild LVH.  2. Treated at [facility] with IV diltiazem, eventually discharged on NOAC with plan for outpatient external cardioversion.  3. 5/18/2022 return for revisit in sinus bradycardia.  4. Digoxin discontinued secondary to CKD and elevated serum level.  3. Aortic insufficiency:  a. JEANETTE, 11/06/2018: EF 60%. Moderate AI, aortic valve leaflets appear rheumatic. Mild-to-moderate MR/TR.   b. Echocardiogram, 09/17/2019: EF 60%. Mild-to-mod AI. Trace-to-mild MR. Mild TR.  4. Chronic diastolic heart failure  a. [facility] in 2008: Reportedly normal, data insufficient  5. Hypertension.  6. Hyperlipidemia.  7. CKD  8. Hx CVA.  9. GERD.  10. Lupus.  11. Anxiety/depression  12. Surgeries:  a. Partial replacement bilateral knees  b. Vaginal surgery  c. Breast  "biopsy  d. Total hysterectomy  e. Knee replacement        Allergies   Allergen Reactions   • Maxzide [Hydrochlorothiazide W-Triamterene] Other (See Comments)     Kidney issues 06/30/20     • Hydralazine Hcl Photosensitivity, Palpitations, GI Intolerance and Headache     Chest pains, headache, diarrhea   • Hytrin [Terazosin] Palpitations     Visual issues- \"bright spots in eyes\" 6/2020   • Nifedipine Er Rash     flushing         Current Outpatient Medications:   •  apixaban (ELIQUIS) 5 MG tablet tablet, Take 1 tablet by mouth Every 12 (Twelve) Hours for 30 days. Indications: Atrial Fibrillation, Disp: 60 tablet, Rfl: 1  •  carvedilol (COREG) 25 MG tablet, Take 25 mg by mouth 2 (Two) Times a Day With Meals., Disp: , Rfl:   •  cetirizine (zyrTEC) 10 MG tablet, Take 10 mg by mouth Daily., Disp: , Rfl:   •  escitalopram (LEXAPRO) 20 MG tablet, Take 20 mg by mouth Every Night., Disp: , Rfl:   •  furosemide (LASIX) 20 MG tablet, Take 10 mg by mouth At Night As Needed., Disp: , Rfl:   •  hydroxychloroquine (PLAQUENIL) 200 MG tablet, Take 200 mg by mouth 2 (Two) Times a Day., Disp: , Rfl:   •  pantoprazole (PROTONIX) 40 MG EC tablet, Take 40 mg by mouth 2 (two) times a day., Disp: , Rfl:   •  rosuvastatin (CRESTOR) 20 MG tablet, Take 20 mg by mouth Every Night., Disp: , Rfl:   •  spironolactone (ALDACTONE) 25 MG tablet, Take 25 mg by mouth Daily., Disp: , Rfl:   •  traMADol (ULTRAM) 50 MG tablet, Take 50 mg by mouth Every 6 (Six) Hours As Needed for Moderate Pain ., Disp: , Rfl:         History of Present Illness    Patient is an 82-year-old  female who is being seen today for further evaluation of atrial flutter.  This was diagnosed earlier this month around Mother's Day.  She presented initially to her primary care physician secondary to complaints of palpitations.  No associated chest pain or shortness of breath.  She was then referred to local hospital for further evaluation.  There she was noted to be in atrial " flutter.  She was admitted and placed on IV Cardizem and heparin.  She eventually became rate controlled and was subsequently discharged with plans for outpatient external cardioversion.  She represented last week for follow-up and at that time was noted to be in sinus bradycardia.  She had a digoxin level drawn which showed elevated levels and this was subsequently discontinued.  She has not before this or since admission had recurrent palpitations.  Denies any syncope, presyncope, or worsened edema.  Overall feels that she is at her usual functional capacity.    The following portions of the patient's history were reviewed and updated as appropriate: allergies, current medications, past family history, past medical history, past social history, past surgical history and problem list.      Social History     Tobacco Use   • Smoking status: Never Smoker   • Smokeless tobacco: Never Used   Vaping Use   • Vaping Use: Never used   Substance Use Topics   • Alcohol use: No   • Drug use: No         Review of Systems   Constitutional: Positive for diaphoresis and malaise/fatigue.   HENT: Positive for tinnitus.    Cardiovascular: Positive for dyspnea on exertion, irregular heartbeat and palpitations. Negative for chest pain, leg swelling and syncope.   Respiratory: Positive for shortness of breath.    Hematologic/Lymphatic: Negative for bleeding problem. Does not bruise/bleed easily.   Skin: Positive for dry skin and itching. Negative for rash.   Musculoskeletal: Positive for arthritis, back pain, muscle cramps and neck pain. Negative for muscle weakness and myalgias.   Gastrointestinal: Positive for bloating and constipation. Negative for heartburn, nausea and vomiting.   Genitourinary: Positive for bladder incontinence and frequency.   Neurological: Positive for headaches. Negative for dizziness, light-headedness, loss of balance and numbness.   Allergic/Immunologic: Positive for environmental allergies.         "  Objective:   /60   Pulse 56   Ht 167.6 cm (66\")   Wt 66.7 kg (147 lb)   SpO2 98%   BMI 23.73 kg/m²         Vitals reviewed.   Constitutional:       Appearance: Healthy appearance. Well-developed and not in distress.   Neck:      Vascular: No JVD.      Trachea: No tracheal deviation.   Pulmonary:      Effort: Pulmonary effort is normal.      Breath sounds: Normal breath sounds.   Cardiovascular:      Normal rate. Regular rhythm.   Pulses:     Intact distal pulses.   Edema:     Peripheral edema absent.   Abdominal:      General: Bowel sounds are normal.      Palpations: Abdomen is soft.      Tenderness: There is no abdominal tenderness.   Musculoskeletal:         General: No deformity. Skin:     General: Skin is warm and dry.   Neurological:      Mental Status: Alert and oriented to person, place, and time.           ECG 12 Lead    Date/Time: 5/25/2022 4:27 PM  Performed by: Dg Martinez MD  Authorized by: Dg Martinez MD   Comparison: compared with previous ECG from 5/18/2022  Similar to previous ECG  Rhythm: sinus bradycardia  Other findings: non-specific ST-T wave changes    Clinical impression: abnormal EKG                  Assessment:   Assessment & Plan      Diagnoses and all orders for this visit:    1. Paroxysmal atrial flutter (HCC) (Primary)    2. Essential hypertension    3. Nonrheumatic aortic valve insufficiency    Other orders  -     ECG 12 Lead      Assessment:   1. Paroxysmal atrial flutter, now in sinus bradycardia on beta-blocker.  Bradycardia is overall asymptomatic.  On anticoagulation with Eliquis.  Records from recent hospitalization reviewed.  On Coreg.  2. Hypertension, mildly elevated today.  Per patient controlled at home.  On Coreg.  3. Aortic valve insufficiency/valvular heart disease with mitral regurgitation as well.  Overall stable.  No evidence of heart failure.  Echocardiogram from hospitalization reviewed.  On Aldactone.    Plan:  1. Long discussion with patient " and son today regarding her diagnosis of atrial flutter as well as treatment options.  At this time given this is her only episode ever previously documented we will simply continue current medical therapy including anticoagulation.  2. Discussed with patient and family today should she have recurrence we could always consider possible ablation therapies versus medical therapy.  3. Agree with discontinuation of digoxin.  Discussed with patient to not take this medication anymore.  4. Follow-up in 6 months time or sooner if needed.       Kathryn GEORGE scribed this dictation for Dr. Dg Martinez.     I have seen and examined the patient, I have reviewed the note, discussed the case with the advance practice clinician, made necessary changes and I agree with the final note.    Dg Martinez MD  05/25/22  16:45 EDT        Dictated utilizing Dragon dictation

## 2022-06-02 ENCOUNTER — TRANSCRIBE ORDERS (OUTPATIENT)
Dept: OTHER | Facility: OTHER | Age: 82
End: 2022-06-02

## 2022-06-02 ENCOUNTER — LAB (OUTPATIENT)
Dept: LAB | Facility: HOSPITAL | Age: 82
End: 2022-06-02

## 2022-06-02 DIAGNOSIS — E78.2 MIXED HYPERLIPIDEMIA: ICD-10-CM

## 2022-06-02 DIAGNOSIS — E53.9 VITAMIN B DEFICIENCY, UNSPECIFIED: ICD-10-CM

## 2022-06-02 DIAGNOSIS — B96.89 OTHER SPECIFIED BACTERIAL AGENTS AS THE CAUSE OF DISEASES CLASSIFIED ELSEWHERE: Primary | ICD-10-CM

## 2022-06-02 DIAGNOSIS — E55.9 VITAMIN D DEFICIENCY, UNSPECIFIED: ICD-10-CM

## 2022-06-02 DIAGNOSIS — E53.8 DEFICIENCY OF OTHER SPECIFIED B GROUP VITAMINS: ICD-10-CM

## 2022-06-02 DIAGNOSIS — B96.89 OTHER SPECIFIED BACTERIAL AGENTS AS THE CAUSE OF DISEASES CLASSIFIED ELSEWHERE: ICD-10-CM

## 2022-06-02 LAB
BASOPHILS # BLD AUTO: 0.03 10*3/MM3 (ref 0–0.2)
BASOPHILS NFR BLD AUTO: 0.7 % (ref 0–1.5)
DEPRECATED RDW RBC AUTO: 46.3 FL (ref 37–54)
EOSINOPHIL # BLD AUTO: 0.16 10*3/MM3 (ref 0–0.4)
EOSINOPHIL NFR BLD AUTO: 3.5 % (ref 0.3–6.2)
ERYTHROCYTE [DISTWIDTH] IN BLOOD BY AUTOMATED COUNT: 13.7 % (ref 12.3–15.4)
HCT VFR BLD AUTO: 27.9 % (ref 34–46.6)
HGB BLD-MCNC: 8.8 G/DL (ref 12–15.9)
IMM GRANULOCYTES # BLD AUTO: 0.01 10*3/MM3 (ref 0–0.05)
IMM GRANULOCYTES NFR BLD AUTO: 0.2 % (ref 0–0.5)
LYMPHOCYTES # BLD AUTO: 0.8 10*3/MM3 (ref 0.7–3.1)
LYMPHOCYTES NFR BLD AUTO: 17.5 % (ref 19.6–45.3)
MCH RBC QN AUTO: 28.9 PG (ref 26.6–33)
MCHC RBC AUTO-ENTMCNC: 31.5 G/DL (ref 31.5–35.7)
MCV RBC AUTO: 91.5 FL (ref 79–97)
MONOCYTES # BLD AUTO: 0.61 10*3/MM3 (ref 0.1–0.9)
MONOCYTES NFR BLD AUTO: 13.3 % (ref 5–12)
NEUTROPHILS NFR BLD AUTO: 2.97 10*3/MM3 (ref 1.7–7)
NEUTROPHILS NFR BLD AUTO: 64.8 % (ref 42.7–76)
NRBC BLD AUTO-RTO: 0 /100 WBC (ref 0–0.2)
PLATELET # BLD AUTO: 141 10*3/MM3 (ref 140–450)
PMV BLD AUTO: 10.1 FL (ref 6–12)
RBC # BLD AUTO: 3.05 10*6/MM3 (ref 3.77–5.28)
WBC NRBC COR # BLD: 4.58 10*3/MM3 (ref 3.4–10.8)

## 2022-06-02 PROCEDURE — 36415 COLL VENOUS BLD VENIPUNCTURE: CPT

## 2022-06-02 PROCEDURE — 85025 COMPLETE CBC W/AUTO DIFF WBC: CPT

## 2022-06-06 ENCOUNTER — LAB (OUTPATIENT)
Dept: LAB | Facility: HOSPITAL | Age: 82
End: 2022-06-06

## 2022-06-06 ENCOUNTER — TRANSCRIBE ORDERS (OUTPATIENT)
Dept: OTHER | Facility: OTHER | Age: 82
End: 2022-06-06

## 2022-06-06 DIAGNOSIS — D64.9 ANEMIA, UNSPECIFIED TYPE: Primary | ICD-10-CM

## 2022-06-06 DIAGNOSIS — D64.9 ANEMIA, UNSPECIFIED TYPE: ICD-10-CM

## 2022-06-06 LAB
BASOPHILS # BLD AUTO: 0.03 10*3/MM3 (ref 0–0.2)
BASOPHILS NFR BLD AUTO: 0.5 % (ref 0–1.5)
DEPRECATED RDW RBC AUTO: 47 FL (ref 37–54)
EOSINOPHIL # BLD AUTO: 0.25 10*3/MM3 (ref 0–0.4)
EOSINOPHIL NFR BLD AUTO: 3.9 % (ref 0.3–6.2)
ERYTHROCYTE [DISTWIDTH] IN BLOOD BY AUTOMATED COUNT: 13.9 % (ref 12.3–15.4)
HCT VFR BLD AUTO: 27.1 % (ref 34–46.6)
HGB BLD-MCNC: 8.5 G/DL (ref 12–15.9)
IMM GRANULOCYTES # BLD AUTO: 0.02 10*3/MM3 (ref 0–0.05)
IMM GRANULOCYTES NFR BLD AUTO: 0.3 % (ref 0–0.5)
LYMPHOCYTES # BLD AUTO: 0.89 10*3/MM3 (ref 0.7–3.1)
LYMPHOCYTES NFR BLD AUTO: 14 % (ref 19.6–45.3)
MCH RBC QN AUTO: 29.3 PG (ref 26.6–33)
MCHC RBC AUTO-ENTMCNC: 31.4 G/DL (ref 31.5–35.7)
MCV RBC AUTO: 93.4 FL (ref 79–97)
MONOCYTES # BLD AUTO: 0.76 10*3/MM3 (ref 0.1–0.9)
MONOCYTES NFR BLD AUTO: 12 % (ref 5–12)
NEUTROPHILS NFR BLD AUTO: 4.39 10*3/MM3 (ref 1.7–7)
NEUTROPHILS NFR BLD AUTO: 69.3 % (ref 42.7–76)
NRBC BLD AUTO-RTO: 0 /100 WBC (ref 0–0.2)
PLATELET # BLD AUTO: 156 10*3/MM3 (ref 140–450)
PMV BLD AUTO: 9.5 FL (ref 6–12)
RBC # BLD AUTO: 2.9 10*6/MM3 (ref 3.77–5.28)
WBC NRBC COR # BLD: 6.34 10*3/MM3 (ref 3.4–10.8)

## 2022-06-06 PROCEDURE — 36415 COLL VENOUS BLD VENIPUNCTURE: CPT

## 2022-06-06 PROCEDURE — 85025 COMPLETE CBC W/AUTO DIFF WBC: CPT

## 2022-06-07 ENCOUNTER — HOSPITAL ENCOUNTER (OUTPATIENT)
Dept: MAMMOGRAPHY | Facility: HOSPITAL | Age: 82
Discharge: HOME | End: 2022-06-07
Admitting: INTERNAL MEDICINE

## 2022-06-07 DIAGNOSIS — Z12.31 VISIT FOR SCREENING MAMMOGRAM: ICD-10-CM

## 2022-06-07 PROCEDURE — 77063 BREAST TOMOSYNTHESIS BI: CPT

## 2022-06-07 PROCEDURE — 77067 SCR MAMMO BI INCL CAD: CPT | Performed by: RADIOLOGY

## 2022-06-07 PROCEDURE — 77063 BREAST TOMOSYNTHESIS BI: CPT | Performed by: RADIOLOGY

## 2022-06-07 PROCEDURE — 77067 SCR MAMMO BI INCL CAD: CPT

## 2022-06-09 ENCOUNTER — LAB (OUTPATIENT)
Dept: LAB | Facility: HOSPITAL | Age: 82
End: 2022-06-09

## 2022-06-09 ENCOUNTER — TELEPHONE (OUTPATIENT)
Dept: CARDIOLOGY | Facility: CLINIC | Age: 82
End: 2022-06-09

## 2022-06-09 ENCOUNTER — TRANSCRIBE ORDERS (OUTPATIENT)
Dept: OTHER | Facility: OTHER | Age: 82
End: 2022-06-09

## 2022-06-09 DIAGNOSIS — D64.9 ANEMIA, UNSPECIFIED TYPE: Primary | ICD-10-CM

## 2022-06-09 DIAGNOSIS — K62.5 HEMORRHAGE OF RECTUM AND ANUS: ICD-10-CM

## 2022-06-09 DIAGNOSIS — D64.9 ANEMIA, UNSPECIFIED TYPE: ICD-10-CM

## 2022-06-09 LAB
BASOPHILS # BLD AUTO: 0.03 10*3/MM3 (ref 0–0.2)
BASOPHILS NFR BLD AUTO: 0.7 % (ref 0–1.5)
DEPRECATED RDW RBC AUTO: 41.8 FL (ref 37–54)
EOSINOPHIL # BLD AUTO: 0.25 10*3/MM3 (ref 0–0.4)
EOSINOPHIL NFR BLD AUTO: 5.5 % (ref 0.3–6.2)
ERYTHROCYTE [DISTWIDTH] IN BLOOD BY AUTOMATED COUNT: 13 % (ref 12.3–15.4)
FERRITIN SERPL-MCNC: 25.2 NG/ML (ref 13–150)
FOLATE SERPL-MCNC: 15.7 NG/ML (ref 4.78–24.2)
HCT VFR BLD AUTO: 25.8 % (ref 34–46.6)
HGB BLD-MCNC: 8.2 G/DL (ref 12–15.9)
IMM GRANULOCYTES # BLD AUTO: 0.01 10*3/MM3 (ref 0–0.05)
IMM GRANULOCYTES NFR BLD AUTO: 0.2 % (ref 0–0.5)
IRON 24H UR-MRATE: 23 MCG/DL (ref 37–145)
IRON SATN MFR SERPL: 5 % (ref 20–50)
LYMPHOCYTES # BLD AUTO: 0.8 10*3/MM3 (ref 0.7–3.1)
LYMPHOCYTES NFR BLD AUTO: 17.7 % (ref 19.6–45.3)
MCH RBC QN AUTO: 28.5 PG (ref 26.6–33)
MCHC RBC AUTO-ENTMCNC: 31.8 G/DL (ref 31.5–35.7)
MCV RBC AUTO: 89.6 FL (ref 79–97)
MONOCYTES # BLD AUTO: 0.7 10*3/MM3 (ref 0.1–0.9)
MONOCYTES NFR BLD AUTO: 15.5 % (ref 5–12)
NEUTROPHILS NFR BLD AUTO: 2.72 10*3/MM3 (ref 1.7–7)
NEUTROPHILS NFR BLD AUTO: 60.4 % (ref 42.7–76)
NRBC BLD AUTO-RTO: 0 /100 WBC (ref 0–0.2)
PLATELET # BLD AUTO: 177 10*3/MM3 (ref 140–450)
PMV BLD AUTO: 10.2 FL (ref 6–12)
RBC # BLD AUTO: 2.88 10*6/MM3 (ref 3.77–5.28)
RETICS # AUTO: 0.07 10*6/MM3 (ref 0.02–0.13)
RETICS/RBC NFR AUTO: 2.57 % (ref 0.7–1.9)
TIBC SERPL-MCNC: 437 MCG/DL (ref 298–536)
TRANSFERRIN SERPL-MCNC: 293 MG/DL (ref 200–360)
VIT B12 BLD-MCNC: 553 PG/ML (ref 211–946)
WBC NRBC COR # BLD: 4.51 10*3/MM3 (ref 3.4–10.8)

## 2022-06-09 PROCEDURE — 82728 ASSAY OF FERRITIN: CPT

## 2022-06-09 PROCEDURE — 82746 ASSAY OF FOLIC ACID SERUM: CPT

## 2022-06-09 PROCEDURE — 85025 COMPLETE CBC W/AUTO DIFF WBC: CPT

## 2022-06-09 PROCEDURE — 85045 AUTOMATED RETICULOCYTE COUNT: CPT

## 2022-06-09 PROCEDURE — 83540 ASSAY OF IRON: CPT

## 2022-06-09 PROCEDURE — 36415 COLL VENOUS BLD VENIPUNCTURE: CPT

## 2022-06-09 PROCEDURE — 84466 ASSAY OF TRANSFERRIN: CPT

## 2022-06-09 PROCEDURE — 82607 VITAMIN B-12: CPT

## 2022-06-13 ENCOUNTER — TRANSCRIBE ORDERS (OUTPATIENT)
Dept: OTHER | Facility: OTHER | Age: 82
End: 2022-06-13

## 2022-06-13 ENCOUNTER — LAB (OUTPATIENT)
Dept: LAB | Facility: HOSPITAL | Age: 82
End: 2022-06-13

## 2022-06-13 DIAGNOSIS — K62.5 HEMORRHAGE OF RECTUM AND ANUS: ICD-10-CM

## 2022-06-13 DIAGNOSIS — D64.9 ANEMIA, UNSPECIFIED TYPE: ICD-10-CM

## 2022-06-13 DIAGNOSIS — D64.9 ANEMIA, UNSPECIFIED TYPE: Primary | ICD-10-CM

## 2022-06-13 LAB
BASOPHILS # BLD AUTO: 0.04 10*3/MM3 (ref 0–0.2)
BASOPHILS NFR BLD AUTO: 0.9 % (ref 0–1.5)
DEPRECATED RDW RBC AUTO: 42.4 FL (ref 37–54)
EOSINOPHIL # BLD AUTO: 0.24 10*3/MM3 (ref 0–0.4)
EOSINOPHIL NFR BLD AUTO: 5.6 % (ref 0.3–6.2)
ERYTHROCYTE [DISTWIDTH] IN BLOOD BY AUTOMATED COUNT: 13 % (ref 12.3–15.4)
FERRITIN SERPL-MCNC: 33.5 NG/ML (ref 13–150)
HCT VFR BLD AUTO: 27.1 % (ref 34–46.6)
HGB BLD-MCNC: 8.6 G/DL (ref 12–15.9)
IMM GRANULOCYTES # BLD AUTO: 0.01 10*3/MM3 (ref 0–0.05)
IMM GRANULOCYTES NFR BLD AUTO: 0.2 % (ref 0–0.5)
LYMPHOCYTES # BLD AUTO: 0.75 10*3/MM3 (ref 0.7–3.1)
LYMPHOCYTES NFR BLD AUTO: 17.4 % (ref 19.6–45.3)
MCH RBC QN AUTO: 28.8 PG (ref 26.6–33)
MCHC RBC AUTO-ENTMCNC: 31.7 G/DL (ref 31.5–35.7)
MCV RBC AUTO: 90.6 FL (ref 79–97)
MONOCYTES # BLD AUTO: 0.51 10*3/MM3 (ref 0.1–0.9)
MONOCYTES NFR BLD AUTO: 11.8 % (ref 5–12)
NEUTROPHILS NFR BLD AUTO: 2.76 10*3/MM3 (ref 1.7–7)
NEUTROPHILS NFR BLD AUTO: 64.1 % (ref 42.7–76)
NRBC BLD AUTO-RTO: 0 /100 WBC (ref 0–0.2)
PLATELET # BLD AUTO: 209 10*3/MM3 (ref 140–450)
PMV BLD AUTO: 9.8 FL (ref 6–12)
RBC # BLD AUTO: 2.99 10*6/MM3 (ref 3.77–5.28)
RETICS # AUTO: 0.09 10*6/MM3 (ref 0.02–0.13)
RETICS/RBC NFR AUTO: 2.98 % (ref 0.7–1.9)
WBC NRBC COR # BLD: 4.31 10*3/MM3 (ref 3.4–10.8)

## 2022-06-13 PROCEDURE — 82728 ASSAY OF FERRITIN: CPT

## 2022-06-13 PROCEDURE — 36415 COLL VENOUS BLD VENIPUNCTURE: CPT

## 2022-06-13 PROCEDURE — 84466 ASSAY OF TRANSFERRIN: CPT

## 2022-06-13 PROCEDURE — 83540 ASSAY OF IRON: CPT

## 2022-06-13 PROCEDURE — 85045 AUTOMATED RETICULOCYTE COUNT: CPT

## 2022-06-13 PROCEDURE — 82746 ASSAY OF FOLIC ACID SERUM: CPT

## 2022-06-13 PROCEDURE — 82607 VITAMIN B-12: CPT

## 2022-06-13 PROCEDURE — 85025 COMPLETE CBC W/AUTO DIFF WBC: CPT

## 2022-06-14 LAB
FOLATE SERPL-MCNC: 18.8 NG/ML (ref 4.78–24.2)
IRON 24H UR-MRATE: 118 MCG/DL (ref 37–145)
IRON SATN MFR SERPL: 26 % (ref 20–50)
TIBC SERPL-MCNC: 456 MCG/DL (ref 298–536)
TRANSFERRIN SERPL-MCNC: 306 MG/DL (ref 200–360)
VIT B12 BLD-MCNC: 511 PG/ML (ref 211–946)

## 2022-06-21 ENCOUNTER — HOSPITAL ENCOUNTER (OUTPATIENT)
Dept: RESPIRATORY THERAPY | Facility: HOSPITAL | Age: 82
Discharge: HOME | End: 2022-06-21

## 2022-06-21 ENCOUNTER — HOSPITAL ENCOUNTER (OUTPATIENT)
Dept: CT IMAGING | Facility: HOSPITAL | Age: 82
Discharge: HOME | End: 2022-06-21

## 2022-06-21 DIAGNOSIS — J84.9 INTERSTITIAL LUNG DISEASE: ICD-10-CM

## 2022-06-21 LAB — CREAT BLDA-MCNC: 1.3 MG/DL (ref 0.6–1.3)

## 2022-06-21 PROCEDURE — 94060 EVALUATION OF WHEEZING: CPT | Performed by: INTERNAL MEDICINE

## 2022-06-21 PROCEDURE — 94726 PLETHYSMOGRAPHY LUNG VOLUMES: CPT

## 2022-06-21 PROCEDURE — 94060 EVALUATION OF WHEEZING: CPT

## 2022-06-21 PROCEDURE — 94664 DEMO&/EVAL PT USE INHALER: CPT

## 2022-06-21 PROCEDURE — 71270 CT THORAX DX C-/C+: CPT

## 2022-06-21 PROCEDURE — 94726 PLETHYSMOGRAPHY LUNG VOLUMES: CPT | Performed by: INTERNAL MEDICINE

## 2022-06-21 PROCEDURE — 94729 DIFFUSING CAPACITY: CPT

## 2022-06-21 PROCEDURE — 94640 AIRWAY INHALATION TREATMENT: CPT

## 2022-06-21 PROCEDURE — 82565 ASSAY OF CREATININE: CPT

## 2022-06-21 PROCEDURE — 25010000002 IOPAMIDOL 61 % SOLUTION: Performed by: INTERNAL MEDICINE

## 2022-06-21 PROCEDURE — 94729 DIFFUSING CAPACITY: CPT | Performed by: INTERNAL MEDICINE

## 2022-06-21 PROCEDURE — 71270 CT THORAX DX C-/C+: CPT | Performed by: RADIOLOGY

## 2022-06-21 RX ORDER — ALBUTEROL SULFATE 2.5 MG/3ML
2.5 SOLUTION RESPIRATORY (INHALATION) ONCE
Status: COMPLETED | OUTPATIENT
Start: 2022-06-21 | End: 2022-06-21

## 2022-06-21 RX ADMIN — IOPAMIDOL 100 ML: 612 INJECTION, SOLUTION INTRAVENOUS at 14:48

## 2022-06-21 RX ADMIN — ALBUTEROL SULFATE 2.5 MG: 2.5 SOLUTION RESPIRATORY (INHALATION) at 13:38

## 2022-06-25 ENCOUNTER — HOSPITAL ENCOUNTER (EMERGENCY)
Facility: HOSPITAL | Age: 82
Discharge: HOME | End: 2022-06-25
Attending: FAMILY MEDICINE | Admitting: FAMILY MEDICINE

## 2022-06-25 VITALS
DIASTOLIC BLOOD PRESSURE: 57 MMHG | TEMPERATURE: 97.7 F | HEIGHT: 66 IN | BODY MASS INDEX: 23.3 KG/M2 | OXYGEN SATURATION: 96 % | HEART RATE: 64 BPM | WEIGHT: 145 LBS | RESPIRATION RATE: 16 BRPM | SYSTOLIC BLOOD PRESSURE: 164 MMHG

## 2022-06-25 DIAGNOSIS — K08.89 PAIN, DENTAL: Primary | ICD-10-CM

## 2022-06-25 PROCEDURE — 99283 EMERGENCY DEPT VISIT LOW MDM: CPT

## 2022-06-25 RX ORDER — HYDROCODONE BITARTRATE AND ACETAMINOPHEN 5; 325 MG/1; MG/1
1 TABLET ORAL 4 TIMES DAILY PRN
Qty: 12 TABLET | Refills: 0 | Status: SHIPPED | OUTPATIENT
Start: 2022-06-25 | End: 2022-08-04

## 2022-06-25 RX ORDER — CEPHALEXIN 500 MG/1
500 CAPSULE ORAL 3 TIMES DAILY
Qty: 21 CAPSULE | Refills: 0 | Status: SHIPPED | OUTPATIENT
Start: 2022-06-25 | End: 2022-07-02

## 2022-06-25 RX ADMIN — BENZOCAINE: 200 LIQUID DENTAL; ORAL; PERIODONTAL at 11:59

## 2022-07-07 LAB — WHOLE BLOOD HOLD COAG: NORMAL

## 2022-08-04 ENCOUNTER — OFFICE VISIT (OUTPATIENT)
Dept: GASTROENTEROLOGY | Facility: CLINIC | Age: 82
End: 2022-08-04

## 2022-08-04 VITALS
WEIGHT: 147 LBS | SYSTOLIC BLOOD PRESSURE: 154 MMHG | DIASTOLIC BLOOD PRESSURE: 64 MMHG | HEART RATE: 70 BPM | HEIGHT: 66 IN | BODY MASS INDEX: 23.63 KG/M2

## 2022-08-04 DIAGNOSIS — Z12.11 ENCOUNTER FOR SCREENING FOR MALIGNANT NEOPLASM OF COLON: ICD-10-CM

## 2022-08-04 DIAGNOSIS — K62.5 BRBPR (BRIGHT RED BLOOD PER RECTUM): Primary | ICD-10-CM

## 2022-08-04 PROCEDURE — 99214 OFFICE O/P EST MOD 30 MIN: CPT | Performed by: PHYSICIAN ASSISTANT

## 2022-08-04 RX ORDER — POLYETHYLENE GLYCOL 3350 17 G/17G
510 POWDER, FOR SOLUTION ORAL ONCE
Qty: 510 G | Refills: 0 | Status: SHIPPED | OUTPATIENT
Start: 2022-08-04 | End: 2022-08-04

## 2022-08-04 NOTE — PROGRESS NOTES
Chief Complaint   Patient presents with   • Rectal Bleeding       Kelsey Avendano is a 82 y.o. female who presents to the office today for evaluation of Rectal Bleeding  .    HPI  Patient presents the clinic today for evaluation of rectal bleeding.  Patient states that this started roughly 2 weeks ago and has been occurring off and on.  Patient states that her first episode that occurred 2 weeks ago was bright red blood per rectum and extremely heavy.  After the episode occurred she was very fatigued which has continued.  She states that the bleeding is not occurring daily however is happened several times.  She has suffered from hemorrhoids from time to time and uses over-the-counter Preparation H which does seem to help.  Her last colonoscopy was roughly 10 years ago by Dr. Broussard.  At that time it was within normal limits.  She denies a family history of colon cancer or GI cancers.  Review of Systems   Constitutional: Negative for fever.   HENT: Negative.    Eyes: Negative.    Respiratory: Negative.    Cardiovascular: Negative.    Gastrointestinal: Positive for abdominal distention, anal bleeding, blood in stool, constipation and diarrhea. Negative for abdominal pain, nausea and vomiting.   Endocrine: Negative.    Genitourinary: Negative.    Musculoskeletal: Negative.    Skin: Negative.    Allergic/Immunologic: Negative.    Neurological: Negative.    Hematological: Negative.    Psychiatric/Behavioral: Negative.        ACTIVE PROBLEMS:   Specialty Problems        Gastroenterology Problems    Cystocele with rectocele        Gastroesophageal reflux disease              PAST MEDICAL HISTORY:  Past Medical History:   Diagnosis Date   • Anemia    • Anxiety    • Aortic insufficiency    • Arthritis    • B12 deficiency    • Bradycardia    • Bronchitis    • CHF (congestive heart failure) (Formerly [facility])    • Depression 2006   • GERD (gastroesophageal reflux disease)    • Hypertension    • Insomnia    • Lupus (Formerly [facility])    • MRSA  (methicillin resistant staph aureus) culture positive    • Osteoporosis    • Renal failure    • Stroke (HCC)     TIA   • TIA (transient ischemic attack)        SURGICAL HISTORY:  Past Surgical History:   Procedure Laterality Date   • ANTERIOR AND POSTERIOR VAGINAL REPAIR  04/2008    Along with vaginal vault suspension and PULLP   • BILATERAL SALPINGO OOPHORECTOMY Bilateral 1984   • BREAST BIOPSY Right 1989    benign   • CARDIAC CATHETERIZATION  2008   • DEEP NECK LYMPH NODE BIOPSY / EXCISION  2002   • KNEE ARTHROPLASTY, PARTIAL REPLACEMENT Bilateral    • TOTAL ABDOMINAL HYSTERECTOMY FRODE PROCEDURE  1979       FAMILY HISTORY:  Family History   Problem Relation Age of Onset   • Arthritis Mother    • Heart attack Mother    • Diabetes Father    • Heart attack Father    • Heart failure Sister    • Heart disease Brother    • Heart failure Brother    • Heart failure Sister    • Breast cancer Neg Hx    • Ovarian cancer Neg Hx        SOCIAL HISTORY:  Social History     Tobacco Use   • Smoking status: Never Smoker   • Smokeless tobacco: Never Used   Substance Use Topics   • Alcohol use: No       CURRENT MEDICATION:    Current Outpatient Medications:   •  carvedilol (COREG) 25 MG tablet, Take 25 mg by mouth 2 (Two) Times a Day With Meals., Disp: , Rfl:   •  cetirizine (zyrTEC) 10 MG tablet, Take 10 mg by mouth Daily., Disp: , Rfl:   •  escitalopram (LEXAPRO) 20 MG tablet, Take 20 mg by mouth Every Night., Disp: , Rfl:   •  furosemide (LASIX) 20 MG tablet, Take 10 mg by mouth At Night As Needed., Disp: , Rfl:   •  hydroxychloroquine (PLAQUENIL) 200 MG tablet, Take 200 mg by mouth 2 (Two) Times a Day., Disp: , Rfl:   •  pantoprazole (PROTONIX) 40 MG EC tablet, Take 40 mg by mouth 2 (two) times a day., Disp: , Rfl:   •  rosuvastatin (CRESTOR) 20 MG tablet, Take 20 mg by mouth Every Night., Disp: , Rfl:   •  spironolactone (ALDACTONE) 25 MG tablet, Take 25 mg by mouth Daily., Disp: , Rfl:     ALLERGIES:  Maxzide  "[hydrochlorothiazide w-triamterene], Hydralazine hcl, Hytrin [terazosin], and Nifedipine er    VISIT VITALS:  /64   Pulse 70   Ht 167.6 cm (66\")   Wt 66.7 kg (147 lb)   BMI 23.73 kg/m²   Physical Exam  Constitutional:       General: She is not in acute distress.     Appearance: Normal appearance. She is well-developed.   HENT:      Head: Normocephalic and atraumatic.   Eyes:      Pupils: Pupils are equal, round, and reactive to light.   Cardiovascular:      Rate and Rhythm: Normal rate and regular rhythm.      Heart sounds: Normal heart sounds.   Pulmonary:      Effort: Pulmonary effort is normal. No respiratory distress.      Breath sounds: Normal breath sounds. No wheezing, rhonchi or rales.   Abdominal:      General: Abdomen is flat. Bowel sounds are normal. There is no distension.      Palpations: Abdomen is soft. There is no mass.      Tenderness: There is no abdominal tenderness. There is no guarding or rebound.      Hernia: No hernia is present.   Musculoskeletal:         General: No swelling. Normal range of motion.      Cervical back: Normal range of motion and neck supple.      Right lower leg: No edema.      Left lower leg: No edema.   Skin:     General: Skin is warm and dry.   Neurological:      Mental Status: She is alert and oriented to person, place, and time.   Psychiatric:         Attention and Perception: Attention normal.         Mood and Affect: Mood normal.         Speech: Speech normal.         Behavior: Behavior normal. Behavior is cooperative.         Thought Content: Thought content normal.         Assessment    Due to patient symptoms-I will go ahead and get her set up to have a colonoscopy performed by Dr. Lockett.  We will be using a MiraLAX colon prep.  The procedure was thoroughly explained to the patient she voiced understanding and agreement to the treatment plan.   Diagnosis Plan   1. BRBPR (bright red blood per rectum)  Case Request    Case Request    polyethylene glycol " (MIRALAX) 17 GM/SCOOP powder   2. Encounter for screening for malignant neoplasm of colon  Case Request    Case Request    polyethylene glycol (MIRALAX) 17 GM/SCOOP powder       Return After procedure.               This document has been electronically signed by Anup Cheung PA-C  August 11, 2022 15:47 EDT    Part of this note may be an electronic transcription/translation of spoken language to printed text using the Dragon Dictation System.

## 2023-02-07 ENCOUNTER — OFFICE VISIT (OUTPATIENT)
Dept: CARDIOLOGY | Facility: CLINIC | Age: 83
End: 2023-02-07
Payer: MEDICARE

## 2023-02-07 VITALS
OXYGEN SATURATION: 95 % | SYSTOLIC BLOOD PRESSURE: 150 MMHG | HEIGHT: 66 IN | DIASTOLIC BLOOD PRESSURE: 72 MMHG | HEART RATE: 62 BPM | WEIGHT: 141 LBS | BODY MASS INDEX: 22.66 KG/M2

## 2023-02-07 DIAGNOSIS — I10 ESSENTIAL HYPERTENSION: ICD-10-CM

## 2023-02-07 DIAGNOSIS — I48.92 PAROXYSMAL ATRIAL FLUTTER: ICD-10-CM

## 2023-02-07 DIAGNOSIS — I50.33 ACUTE ON CHRONIC DIASTOLIC CHF (CONGESTIVE HEART FAILURE): Primary | ICD-10-CM

## 2023-02-07 DIAGNOSIS — I34.0 MILD MITRAL REGURGITATION: ICD-10-CM

## 2023-02-07 DIAGNOSIS — I35.1 MODERATE AORTIC INSUFFICIENCY: ICD-10-CM

## 2023-02-07 PROCEDURE — 99214 OFFICE O/P EST MOD 30 MIN: CPT | Performed by: INTERNAL MEDICINE

## 2023-02-07 RX ORDER — SENNOSIDES 8.6 MG
650 CAPSULE ORAL AS NEEDED
COMMUNITY

## 2023-02-07 RX ORDER — FERROUS GLUCONATE 324(37.5)
324 TABLET ORAL
COMMUNITY

## 2023-02-07 NOTE — PROGRESS NOTES
Subjective:     Encounter Date:02/07/2023    Primary Care Physician: Sudhir Arita MD      Patient ID: Kelsey Avendano is a 83 y.o. female.    Chief Complaint:Hypertension (She has had a cold for about a week, congestion. /Shortness of  breath)    Problem List:  1. TIA; on aspirin (not daily); right sided visual deficit:  a. MRI, 02/28/2018: Age-appropriate diffuse generalized cerebral atrophy with gliosis in the white matter and ill defined oat infarct in the cerebellum on the left with no acute areas of ischemia  b. MRA, 02/28/2018: Neck, slightly tortuous vessels consistent with atherosclerotic changes and no focal areas of stenosis. Antegrade flow noted bilaterally.  c. Echocardiogram, 02/28/2018: EF 60-65%, grade 2 diastolic dysfunction with mildly dilated right atrial cavity is moderate pulmonary hypertension.  d. Holter 3/2018: Normal sinus rhythm/sinus bradycardia at 58 bpm.  e. JEANETTE, 11/06/2018: EF 60%. Moderate AI, aortic valve leaflets appear rheumatic. Mild-to-moderate MR/TR. Most likely source of patient's TIA is aortic atheroma.  2. Paroxysmal atrial flutter  1. 5/10/2022 echo EF 61 to 65%.  Right atrium moderately dilated, left atrium moderately volume increased.  Moderate MR, moderate TR.  RVSP 35 to 45 mmHg.  Mild LVH.  2. Treated at [facility] with IV diltiazem, eventually discharged on NOAC with plan for outpatient external cardioversion.  3. 5/18/2022 return for revisit in sinus bradycardia.  4. Digoxin discontinued secondary to CKD and elevated serum level.  3. Aortic insufficiency:  a. JEANETTE, 11/06/2018: EF 60%. Moderate AI, aortic valve leaflets appear rheumatic. Mild-to-moderate MR/TR.   b. Echocardiogram, 09/17/2019: EF 60%. Mild-to-mod AI. Trace-to-mild MR. Mild TR.  4. Chronic diastolic heart failure  a. [facility] in 2008: Reportedly normal, data insufficient  5. Hypertension.  6. Hyperlipidemia.  7. CKD  8. Hx CVA.  9. GERD.  10. Lupus.  11. Anxiety/depression  12. Surgeries:  a. Partial  "replacement bilateral knees  b. Vaginal surgery  c. Breast biopsy  d. Total hysterectomy  e. Knee replacement      Allergies   Allergen Reactions   • Maxzide [Hydrochlorothiazide W-Triamterene] Other (See Comments)     Kidney issues 06/30/20     • Hydralazine Hcl Photosensitivity, Palpitations, GI Intolerance and Headache     Chest pains, headache, diarrhea   • Hytrin [Terazosin] Palpitations     Visual issues- \"bright spots in eyes\" 6/2020   • Nifedipine Er Rash     flushing         Current Outpatient Medications:   •  acetaminophen (TYLENOL) 650 MG 8 hr tablet, Take 650 mg by mouth As Needed for Mild Pain., Disp: , Rfl:   •  apixaban (ELIQUIS) 2.5 MG tablet tablet, Take 2.5 mg by mouth 2 (Two) Times a Day., Disp: , Rfl:   •  carvedilol (COREG) 25 MG tablet, Take 25 mg by mouth 2 (Two) Times a Day With Meals., Disp: , Rfl:   •  cetirizine (zyrTEC) 10 MG tablet, Take 10 mg by mouth Daily., Disp: , Rfl:   •  escitalopram (LEXAPRO) 20 MG tablet, Take 20 mg by mouth Every Night., Disp: , Rfl:   •  ferrous gluconate 324 (37.5 Fe) MG tablet tablet, Take 324 mg by mouth Daily With Breakfast., Disp: , Rfl:   •  furosemide (LASIX) 20 MG tablet, Take 10 mg by mouth At Night As Needed., Disp: , Rfl:   •  hydroxychloroquine (PLAQUENIL) 200 MG tablet, Take 200 mg by mouth 2 (Two) Times a Day., Disp: , Rfl:   •  pantoprazole (PROTONIX) 40 MG EC tablet, Take 40 mg by mouth 2 (two) times a day., Disp: , Rfl:   •  rosuvastatin (CRESTOR) 20 MG tablet, Take 20 mg by mouth Every Night., Disp: , Rfl:   •  spironolactone (ALDACTONE) 25 MG tablet, Take 25 mg by mouth Daily., Disp: , Rfl:         History of Present Illness    Patient returns today for annual follow-up of atrial arrhythmias and valvular heart disease.  Since her last visit she was doing well until proxy 1 month ago when she developed the onset of shortness of breath was seen by primary physician told she had \"some fluid\", was treated with prednisone and antibiotics but no " "significant improvement.  She did note some hallucinations and overall feeling poorly since has been on the prednisone for the last several days.  She has no orthopnea PND.  She was bicycling regular up until this recent event.  Her blood pressure was normal until starting on the prednisone.  Denies peripheral edema    The following portions of the patient's history were reviewed and updated as appropriate: allergies, current medications, past family history, past medical history, past social history, past surgical history and problem list.    Family History   Problem Relation Age of Onset   • Arthritis Mother    • Heart attack Mother    • Diabetes Father    • Heart attack Father    • Heart failure Sister    • Heart disease Brother    • Heart failure Brother    • Heart failure Sister    • Breast cancer Neg Hx    • Ovarian cancer Neg Hx        Social History     Tobacco Use   • Smoking status: Never   • Smokeless tobacco: Never   Vaping Use   • Vaping Use: Never used   Substance Use Topics   • Alcohol use: No   • Drug use: No         ROS       Objective:   /72 (BP Location: Right arm, Patient Position: Sitting)   Pulse 62   Ht 167.6 cm (66\")   Wt 64 kg (141 lb)   SpO2 95%   BMI 22.76 kg/m²         Vitals reviewed.   Constitutional:       Appearance: Well-developed and not in distress.   Neck:      Thyroid: No thyromegaly.      Vascular: No carotid bruit or JVD.   Pulmonary:      Breath sounds: Normal breath sounds.   Cardiovascular:      Regular rhythm.      No gallop. No S3 and S4 gallop.   Abdominal:      General: Bowel sounds are normal.      Palpations: Abdomen is soft. There is no abdominal mass.      Tenderness: There is no abdominal tenderness.   Musculoskeletal:         General: No deformity.      Extremities: No clubbing present.Skin:     General: Skin is warm and dry.      Findings: No rash.   Neurological:      Mental Status: Alert and oriented to person, place, and time. "         Procedures          Assessment:   Assessment & Plan      Diagnoses and all orders for this visit:    1. Acute on chronic diastolic CHF (congestive heart failure) (HCC) (Primary)    2. Essential hypertension    3. Mild mitral regurgitation    4. Moderate aortic insufficiency    5. Paroxysmal atrial flutter (HCC)      1.  Mild to moderate valvular heart disease, AR/MR.  Asymptomatic.  2.  History of diastolic heart failure.  Currently appears euvolemic  3.  Dyspnea exertion appears to be due to recent acute bronchitis is improving today.  4.  Atrial arrhythmias, chronically anticoagulated with apixaban.  Asymptomatic  5.  Hypertension, suboptimal control today due to prednisone.    Recommendations:  1.  Follow-up primary physician for blood pressure check in several weeks once prednisone has resolved.  2.  At this time will not make any further changes to her medical regimen.  3.  Regarding her dyspnea on exertion, as long as this improves over the next week to we will make no changes.  If it worsens or fails to improve, can consider repeat noninvasive evaluation.  (Including BNP)  4.  Follow-up annual appearance of the change       Advance Care Planning   ACP discussion was held with the patient during this visit. Patient has an advance directive (not in EMR), copy requested.      Dg Martinez MD    Dictated utilizing Dragon dictation

## 2023-02-10 ENCOUNTER — HOSPITAL ENCOUNTER (OUTPATIENT)
Dept: GENERAL RADIOLOGY | Facility: HOSPITAL | Age: 83
Discharge: HOME | End: 2023-02-10
Admitting: PHYSICIAN ASSISTANT
Payer: MEDICARE

## 2023-02-10 ENCOUNTER — TRANSCRIBE ORDERS (OUTPATIENT)
Dept: ADMINISTRATIVE | Facility: HOSPITAL | Age: 83
End: 2023-02-10
Payer: MEDICARE

## 2023-02-10 DIAGNOSIS — J06.9 ACUTE URI: ICD-10-CM

## 2023-02-10 DIAGNOSIS — J06.9 ACUTE URI: Primary | ICD-10-CM

## 2023-02-10 PROCEDURE — 71046 X-RAY EXAM CHEST 2 VIEWS: CPT

## 2023-02-10 PROCEDURE — 71046 X-RAY EXAM CHEST 2 VIEWS: CPT | Performed by: RADIOLOGY

## 2023-05-08 ENCOUNTER — TRANSCRIBE ORDERS (OUTPATIENT)
Dept: ADMINISTRATIVE | Facility: HOSPITAL | Age: 83
End: 2023-05-08
Payer: MEDICARE

## 2023-05-08 DIAGNOSIS — Z12.31 VISIT FOR SCREENING MAMMOGRAM: Primary | ICD-10-CM

## 2023-05-31 ENCOUNTER — TRANSCRIBE ORDERS (OUTPATIENT)
Dept: ADMINISTRATIVE | Facility: HOSPITAL | Age: 83
End: 2023-05-31

## 2023-05-31 ENCOUNTER — HOSPITAL ENCOUNTER (OUTPATIENT)
Dept: GENERAL RADIOLOGY | Facility: HOSPITAL | Age: 83
Discharge: HOME | End: 2023-05-31
Admitting: NURSE PRACTITIONER

## 2023-05-31 DIAGNOSIS — J18.9 ACUTE PNEUMONIA: Primary | ICD-10-CM

## 2023-05-31 DIAGNOSIS — J18.9 ACUTE PNEUMONIA: ICD-10-CM

## 2023-05-31 PROCEDURE — 71046 X-RAY EXAM CHEST 2 VIEWS: CPT

## 2023-05-31 PROCEDURE — 71046 X-RAY EXAM CHEST 2 VIEWS: CPT | Performed by: RADIOLOGY

## 2023-06-09 ENCOUNTER — HOSPITAL ENCOUNTER (OUTPATIENT)
Dept: MAMMOGRAPHY | Facility: HOSPITAL | Age: 83
Discharge: HOME | End: 2023-06-09
Admitting: INTERNAL MEDICINE
Payer: MEDICARE

## 2023-06-09 DIAGNOSIS — Z12.31 VISIT FOR SCREENING MAMMOGRAM: ICD-10-CM

## 2023-06-09 PROCEDURE — 77063 BREAST TOMOSYNTHESIS BI: CPT | Performed by: RADIOLOGY

## 2023-06-09 PROCEDURE — 77067 SCR MAMMO BI INCL CAD: CPT | Performed by: RADIOLOGY

## 2023-06-09 PROCEDURE — 77063 BREAST TOMOSYNTHESIS BI: CPT

## 2023-06-09 PROCEDURE — 77067 SCR MAMMO BI INCL CAD: CPT

## 2023-07-28 ENCOUNTER — TRANSCRIBE ORDERS (OUTPATIENT)
Dept: ADMINISTRATIVE | Facility: HOSPITAL | Age: 83
End: 2023-07-28
Payer: MEDICARE

## 2023-07-28 DIAGNOSIS — R91.8 PULMONARY NODULES: Primary | ICD-10-CM

## 2023-08-08 ENCOUNTER — APPOINTMENT (OUTPATIENT)
Dept: GENERAL RADIOLOGY | Facility: HOSPITAL | Age: 83
End: 2023-08-08
Payer: MEDICARE

## 2023-08-08 ENCOUNTER — HOSPITAL ENCOUNTER (EMERGENCY)
Facility: HOSPITAL | Age: 83
Discharge: HOME | End: 2023-08-08
Attending: STUDENT IN AN ORGANIZED HEALTH CARE EDUCATION/TRAINING PROGRAM | Admitting: STUDENT IN AN ORGANIZED HEALTH CARE EDUCATION/TRAINING PROGRAM
Payer: MEDICARE

## 2023-08-08 VITALS
SYSTOLIC BLOOD PRESSURE: 164 MMHG | HEIGHT: 66 IN | WEIGHT: 135 LBS | RESPIRATION RATE: 17 BRPM | OXYGEN SATURATION: 98 % | DIASTOLIC BLOOD PRESSURE: 67 MMHG | TEMPERATURE: 98 F | HEART RATE: 61 BPM | BODY MASS INDEX: 21.69 KG/M2

## 2023-08-08 DIAGNOSIS — I48.0 PAROXYSMAL ATRIAL FIBRILLATION: Primary | ICD-10-CM

## 2023-08-08 LAB
ALBUMIN SERPL-MCNC: 4.2 G/DL (ref 3.5–5.2)
ALBUMIN/GLOB SERPL: 1.4 G/DL
ALP SERPL-CCNC: 70 U/L (ref 39–117)
ALT SERPL W P-5'-P-CCNC: 23 U/L (ref 1–33)
ANION GAP SERPL CALCULATED.3IONS-SCNC: 10 MMOL/L (ref 5–15)
AST SERPL-CCNC: 45 U/L (ref 1–32)
BASOPHILS # BLD AUTO: 0.05 10*3/MM3 (ref 0–0.2)
BASOPHILS NFR BLD AUTO: 0.7 % (ref 0–1.5)
BILIRUB SERPL-MCNC: 0.5 MG/DL (ref 0–1.2)
BILIRUB UR QL STRIP: NEGATIVE
BUN SERPL-MCNC: 21 MG/DL (ref 8–23)
BUN/CREAT SERPL: 18.3 (ref 7–25)
CALCIUM SPEC-SCNC: 9.8 MG/DL (ref 8.6–10.5)
CHLORIDE SERPL-SCNC: 103 MMOL/L (ref 98–107)
CLARITY UR: CLEAR
CO2 SERPL-SCNC: 29 MMOL/L (ref 22–29)
COLOR UR: YELLOW
CREAT SERPL-MCNC: 1.15 MG/DL (ref 0.57–1)
DEPRECATED RDW RBC AUTO: 47 FL (ref 37–54)
EGFRCR SERPLBLD CKD-EPI 2021: 47.7 ML/MIN/1.73
EOSINOPHIL # BLD AUTO: 0.2 10*3/MM3 (ref 0–0.4)
EOSINOPHIL NFR BLD AUTO: 2.9 % (ref 0.3–6.2)
ERYTHROCYTE [DISTWIDTH] IN BLOOD BY AUTOMATED COUNT: 13.3 % (ref 12.3–15.4)
FLUAV RNA RESP QL NAA+PROBE: NOT DETECTED
FLUBV RNA RESP QL NAA+PROBE: NOT DETECTED
GEN 5 2HR TROPONIN T REFLEX: 21 NG/L
GLOBULIN UR ELPH-MCNC: 3 GM/DL
GLUCOSE SERPL-MCNC: 96 MG/DL (ref 65–99)
GLUCOSE UR STRIP-MCNC: NEGATIVE MG/DL
HCT VFR BLD AUTO: 40.4 % (ref 34–46.6)
HGB BLD-MCNC: 12.8 G/DL (ref 12–15.9)
HGB UR QL STRIP.AUTO: NEGATIVE
IMM GRANULOCYTES # BLD AUTO: 0.01 10*3/MM3 (ref 0–0.05)
IMM GRANULOCYTES NFR BLD AUTO: 0.1 % (ref 0–0.5)
KETONES UR QL STRIP: NEGATIVE
LEUKOCYTE ESTERASE UR QL STRIP.AUTO: NEGATIVE
LYMPHOCYTES # BLD AUTO: 0.9 10*3/MM3 (ref 0.7–3.1)
LYMPHOCYTES NFR BLD AUTO: 13.2 % (ref 19.6–45.3)
MCH RBC QN AUTO: 30.4 PG (ref 26.6–33)
MCHC RBC AUTO-ENTMCNC: 31.7 G/DL (ref 31.5–35.7)
MCV RBC AUTO: 96 FL (ref 79–97)
MONOCYTES # BLD AUTO: 0.81 10*3/MM3 (ref 0.1–0.9)
MONOCYTES NFR BLD AUTO: 11.9 % (ref 5–12)
NEUTROPHILS NFR BLD AUTO: 4.85 10*3/MM3 (ref 1.7–7)
NEUTROPHILS NFR BLD AUTO: 71.2 % (ref 42.7–76)
NITRITE UR QL STRIP: NEGATIVE
NRBC BLD AUTO-RTO: 0 /100 WBC (ref 0–0.2)
NT-PROBNP SERPL-MCNC: 1550 PG/ML (ref 0–1800)
PH UR STRIP.AUTO: 5.5 [PH] (ref 5–8)
PLATELET # BLD AUTO: 149 10*3/MM3 (ref 140–450)
PMV BLD AUTO: 10.3 FL (ref 6–12)
POTASSIUM SERPL-SCNC: 4.9 MMOL/L (ref 3.5–5.2)
PROT SERPL-MCNC: 7.2 G/DL (ref 6–8.5)
PROT UR QL STRIP: NEGATIVE
QT INTERVAL: 316 MS
QTC INTERVAL: 471 MS
RBC # BLD AUTO: 4.21 10*6/MM3 (ref 3.77–5.28)
SARS-COV-2 RNA RESP QL NAA+PROBE: NOT DETECTED
SODIUM SERPL-SCNC: 142 MMOL/L (ref 136–145)
SP GR UR STRIP: 1.01 (ref 1–1.03)
TROPONIN T DELTA: -3 NG/L
TROPONIN T SERPL HS-MCNC: 24 NG/L
UROBILINOGEN UR QL STRIP: NORMAL
WBC NRBC COR # BLD: 6.82 10*3/MM3 (ref 3.4–10.8)

## 2023-08-08 PROCEDURE — 36415 COLL VENOUS BLD VENIPUNCTURE: CPT

## 2023-08-08 PROCEDURE — 71045 X-RAY EXAM CHEST 1 VIEW: CPT

## 2023-08-08 PROCEDURE — 93005 ELECTROCARDIOGRAM TRACING: CPT | Performed by: PHYSICIAN ASSISTANT

## 2023-08-08 PROCEDURE — 83880 ASSAY OF NATRIURETIC PEPTIDE: CPT | Performed by: PHYSICIAN ASSISTANT

## 2023-08-08 PROCEDURE — 80053 COMPREHEN METABOLIC PANEL: CPT | Performed by: PHYSICIAN ASSISTANT

## 2023-08-08 PROCEDURE — 84484 ASSAY OF TROPONIN QUANT: CPT | Performed by: PHYSICIAN ASSISTANT

## 2023-08-08 PROCEDURE — 81003 URINALYSIS AUTO W/O SCOPE: CPT | Performed by: PHYSICIAN ASSISTANT

## 2023-08-08 PROCEDURE — 93005 ELECTROCARDIOGRAM TRACING: CPT | Performed by: STUDENT IN AN ORGANIZED HEALTH CARE EDUCATION/TRAINING PROGRAM

## 2023-08-08 PROCEDURE — 99284 EMERGENCY DEPT VISIT MOD MDM: CPT

## 2023-08-08 PROCEDURE — 71045 X-RAY EXAM CHEST 1 VIEW: CPT | Performed by: RADIOLOGY

## 2023-08-08 PROCEDURE — 93010 ELECTROCARDIOGRAM REPORT: CPT | Performed by: INTERNAL MEDICINE

## 2023-08-08 PROCEDURE — 87636 SARSCOV2 & INF A&B AMP PRB: CPT | Performed by: PHYSICIAN ASSISTANT

## 2023-08-08 PROCEDURE — 85025 COMPLETE CBC W/AUTO DIFF WBC: CPT | Performed by: PHYSICIAN ASSISTANT

## 2023-08-08 RX ORDER — CARVEDILOL 25 MG/1
25 TABLET ORAL ONCE
Status: COMPLETED | OUTPATIENT
Start: 2023-08-08 | End: 2023-08-08

## 2023-08-08 RX ORDER — SODIUM CHLORIDE 0.9 % (FLUSH) 0.9 %
10 SYRINGE (ML) INJECTION AS NEEDED
Status: DISCONTINUED | OUTPATIENT
Start: 2023-08-08 | End: 2023-08-08 | Stop reason: HOSPADM

## 2023-08-08 RX ADMIN — CARVEDILOL 25 MG: 25 TABLET, FILM COATED ORAL at 12:28

## 2023-08-08 RX ADMIN — SODIUM CHLORIDE 1000 ML: 9 INJECTION, SOLUTION INTRAVENOUS at 12:30

## 2023-08-08 NOTE — ED PROVIDER NOTES
Subjective   History of Present Illness  83-year-old female presents secondary to elevated heart rate.  Patient states she awoke this morning around 830 with an elevated heart rate.  Patient has a history of atrial flutter in the past.  She is on Eliquis.  She takes Coreg 25 mg p.o. a day.  She states that typically she does not have any problems with her rate or rhythm.  She is followed by Dr. Martinez in [place].  Patient denies any chest pain pressure tightness or squeezing.  She denies any shortness of breath.  No recent fever or illness.  She states that she went to bed feeling fine.  She has a past medical history of atrial flutter, renal failure, stroke, anemia, lupus, congestive heart failure and hypertension.  She presents by private vehicle.    Review of Systems   Constitutional: Negative.  Negative for fever.   HENT: Negative.     Respiratory: Negative.     Cardiovascular:  Positive for palpitations. Negative for chest pain.   Gastrointestinal: Negative.  Negative for abdominal pain.   Endocrine: Negative.    Genitourinary: Negative.  Negative for dysuria.   Skin: Negative.    Neurological: Negative.    Psychiatric/Behavioral: Negative.     All other systems reviewed and are negative.    Past Medical History:   Diagnosis Date    Anemia     Anxiety     Aortic insufficiency     Arthritis     B12 deficiency     Bradycardia     Bronchitis     CHF (congestive heart failure)     Depression 2006    GERD (gastroesophageal reflux disease)     Hypertension     Insomnia     Lupus     MRSA (methicillin resistant staph aureus) culture positive     Osteoporosis     Renal failure     Stroke     TIA    TIA (transient ischemic attack)        Allergies   Allergen Reactions    Maxzide [Hydrochlorothiazide W-Triamterene] Other (See Comments)     Kidney issues 06/30/20      Hydralazine Hcl Photosensitivity, Palpitations, GI Intolerance and Headache     Chest pains, headache, diarrhea    Hytrin [Terazosin] Palpitations      "Visual issues- \"bright spots in eyes\" 6/2020    Nifedipine Er Rash     flushing       Past Surgical History:   Procedure Laterality Date    ANTERIOR AND POSTERIOR VAGINAL REPAIR  04/2008    Along with vaginal vault suspension and PULLP    BILATERAL SALPINGO OOPHORECTOMY Bilateral 1984    BREAST BIOPSY Right 1989    benign    CARDIAC CATHETERIZATION  2008    DEEP NECK LYMPH NODE BIOPSY / EXCISION  2002    KNEE ARTHROPLASTY, PARTIAL REPLACEMENT Bilateral     TOTAL ABDOMINAL HYSTERECTOMY FORDE PROCEDURE  1979       Family History   Problem Relation Age of Onset    Arthritis Mother     Heart attack Mother     Diabetes Father     Heart attack Father     Heart failure Sister     Heart disease Brother     Heart failure Brother     Heart failure Sister     Breast cancer Neg Hx     Ovarian cancer Neg Hx        Social History     Socioeconomic History    Marital status:    Tobacco Use    Smoking status: Never    Smokeless tobacco: Never   Vaping Use    Vaping Use: Never used   Substance and Sexual Activity    Alcohol use: No    Drug use: No    Sexual activity: Defer           Objective   Physical Exam  Vitals and nursing note reviewed.   Constitutional:       General: She is not in acute distress.     Appearance: She is well-developed. She is not diaphoretic.   HENT:      Head: Normocephalic and atraumatic.      Right Ear: External ear normal.      Left Ear: External ear normal.      Nose: Nose normal.   Eyes:      Conjunctiva/sclera: Conjunctivae normal.      Pupils: Pupils are equal, round, and reactive to light.   Neck:      Vascular: No JVD.      Trachea: No tracheal deviation.   Cardiovascular:      Rate and Rhythm: Regular rhythm. Tachycardia present.      Heart sounds: Normal heart sounds. No murmur heard.  Pulmonary:      Effort: Pulmonary effort is normal. No respiratory distress.      Breath sounds: Normal breath sounds. No wheezing.   Abdominal:      General: Bowel sounds are normal.      Palpations: Abdomen " is soft.      Tenderness: There is no abdominal tenderness.   Musculoskeletal:         General: No deformity. Normal range of motion.      Cervical back: Normal range of motion and neck supple.   Skin:     General: Skin is warm and dry.      Coloration: Skin is not pale.      Findings: No erythema or rash.   Neurological:      Mental Status: She is alert and oriented to person, place, and time.      Cranial Nerves: No cranial nerve deficit.   Psychiatric:         Behavior: Behavior normal.         Thought Content: Thought content normal.       Procedures           ED Course  ED Course as of 08/08/23 1826   Tue Aug 08, 2023   1139 EKG notes atrial fibrillation.  134 bpm.  Concerns for atrial fibrillation with rapid ventricular rate.  No acute ST elevation.  QTc 471.  Electronically signed by Elver Aparicio DO, 08/08/23, 11:40 AM EDT.   [SF]   1235 Repeat EKG notes sinus rhythm.  70 bpm.  No acute ST elevation.  QTc 438.  Electronically signed by Elver Aparicio DO, 08/08/23, 12:35 PM EDT.   [SF]   1825 Patient converted to normal sinus rhythm on her own prior to any intervention.  She was given some fluids along with her Coreg.  She maintained normal sinus rhythm throughout the ER course and her ER course was uneventful. [JI]      ED Course User Index  [JI] Vargas Marcum PA  [SF] Elver Aparicio DO           Results for orders placed or performed during the hospital encounter of 08/08/23   COVID-19 and FLU A/B PCR - Swab, Nasopharynx    Specimen: Nasopharynx; Swab   Result Value Ref Range    COVID19 Not Detected Not Detected - Ref. Range    Influenza A PCR Not Detected Not Detected    Influenza B PCR Not Detected Not Detected   Comprehensive Metabolic Panel    Specimen: Blood   Result Value Ref Range    Glucose 96 65 - 99 mg/dL    BUN 21 8 - 23 mg/dL    Creatinine 1.15 (H) 0.57 - 1.00 mg/dL    Sodium 142 136 - 145 mmol/L    Potassium 4.9 3.5 - 5.2 mmol/L    Chloride 103 98 - 107 mmol/L    CO2 29.0 22.0 - 29.0  mmol/L    Calcium 9.8 8.6 - 10.5 mg/dL    Total Protein 7.2 6.0 - 8.5 g/dL    Albumin 4.2 3.5 - 5.2 g/dL    ALT (SGPT) 23 1 - 33 U/L    AST (SGOT) 45 (H) 1 - 32 U/L    Alkaline Phosphatase 70 39 - 117 U/L    Total Bilirubin 0.5 0.0 - 1.2 mg/dL    Globulin 3.0 gm/dL    A/G Ratio 1.4 g/dL    BUN/Creatinine Ratio 18.3 7.0 - 25.0    Anion Gap 10.0 5.0 - 15.0 mmol/L    eGFR 47.7 (L) >60.0 mL/min/1.73   Urinalysis With Microscopic If Indicated (No Culture) - Urine, Clean Catch    Specimen: Urine, Clean Catch   Result Value Ref Range    Color, UA Yellow Yellow, Straw    Appearance, UA Clear Clear    pH, UA 5.5 5.0 - 8.0    Specific Gravity, UA 1.008 1.005 - 1.030    Glucose, UA Negative Negative    Ketones, UA Negative Negative    Bilirubin, UA Negative Negative    Blood, UA Negative Negative    Protein, UA Negative Negative    Leuk Esterase, UA Negative Negative    Nitrite, UA Negative Negative    Urobilinogen, UA 0.2 E.U./dL 0.2 - 1.0 E.U./dL   BNP    Specimen: Blood   Result Value Ref Range    proBNP 1,550.0 0.0 - 1,800.0 pg/mL   High Sensitivity Troponin T    Specimen: Blood   Result Value Ref Range    HS Troponin T 24 (H) <10 ng/L   CBC Auto Differential    Specimen: Blood   Result Value Ref Range    WBC 6.82 3.40 - 10.80 10*3/mm3    RBC 4.21 3.77 - 5.28 10*6/mm3    Hemoglobin 12.8 12.0 - 15.9 g/dL    Hematocrit 40.4 34.0 - 46.6 %    MCV 96.0 79.0 - 97.0 fL    MCH 30.4 26.6 - 33.0 pg    MCHC 31.7 31.5 - 35.7 g/dL    RDW 13.3 12.3 - 15.4 %    RDW-SD 47.0 37.0 - 54.0 fl    MPV 10.3 6.0 - 12.0 fL    Platelets 149 140 - 450 10*3/mm3    Neutrophil % 71.2 42.7 - 76.0 %    Lymphocyte % 13.2 (L) 19.6 - 45.3 %    Monocyte % 11.9 5.0 - 12.0 %    Eosinophil % 2.9 0.3 - 6.2 %    Basophil % 0.7 0.0 - 1.5 %    Immature Grans % 0.1 0.0 - 0.5 %    Neutrophils, Absolute 4.85 1.70 - 7.00 10*3/mm3    Lymphocytes, Absolute 0.90 0.70 - 3.10 10*3/mm3    Monocytes, Absolute 0.81 0.10 - 0.90 10*3/mm3    Eosinophils, Absolute 0.20 0.00 - 0.40  10*3/mm3    Basophils, Absolute 0.05 0.00 - 0.20 10*3/mm3    Immature Grans, Absolute 0.01 0.00 - 0.05 10*3/mm3    nRBC 0.0 0.0 - 0.2 /100 WBC   High Sensitivity Troponin T 2Hr    Specimen: Blood   Result Value Ref Range    HS Troponin T 21 (H) <10 ng/L    Troponin T Delta -3 >=-4 - <+4 ng/L   ECG 12 Lead Tachycardia   Result Value Ref Range    QT Interval 316 ms    QTC Interval 471 ms     XR Chest 1 View    Result Date: 8/8/2023    Cardiomegaly. Otherwise no acute cardiopulmonary findings.  This report was finalized on 8/8/2023 12:39 PM by Dr. Robert Ramsay MD.                                     Medical Decision Making  83-year-old female presents secondary to elevated heart rate.  Patient states she awoke this morning around 830 with an elevated heart rate.  Patient has a history of atrial flutter in the past.  She is on Eliquis.  She takes Coreg 25 mg p.o. a day.  She states that typically she does not have any problems with her rate or rhythm.  She is followed by Dr. Martinez in [place].  Patient denies any chest pain pressure tightness or squeezing.  She denies any shortness of breath.  No recent fever or illness.  She states that she went to bed feeling fine.  She has a past medical history of atrial flutter, renal failure, stroke, anemia, lupus, congestive heart failure and hypertension.  She presents by private vehicle.    Problems Addressed:  Paroxysmal atrial fibrillation: complicated acute illness or injury    Amount and/or Complexity of Data Reviewed  Labs: ordered. Decision-making details documented in ED Course.  Radiology: ordered. Decision-making details documented in ED Course.  ECG/medicine tests: ordered.    Risk  Prescription drug management.  Risk Details: Patient was counseled on signs and symptoms of worsening on appropriate follow-up.  She was counseled at her diagnostic work-up and labs.  She voices understanding.        Final diagnoses:   Paroxysmal atrial fibrillation       ED  Disposition  ED Disposition       ED Disposition   Discharge    Condition   Stable    Comment   --               Sudhir Arita MD  [address] [facility] [address]  [phone]    Schedule an appointment as soon as possible for a visit            Medication List      No changes were made to your prescriptions during this visit.            Vargas Marcum PA  08/08/23 1556

## 2023-08-10 LAB
QT INTERVAL: 406 MS
QTC INTERVAL: 438 MS

## 2023-08-11 ENCOUNTER — TELEPHONE (OUTPATIENT)
Dept: CARDIOLOGY | Facility: CLINIC | Age: 83
End: 2023-08-11
Payer: MEDICARE

## 2023-08-11 RX ORDER — AMIODARONE HYDROCHLORIDE 200 MG/1
200 TABLET ORAL 2 TIMES DAILY
Qty: 60 TABLET | Refills: 1 | Status: SHIPPED | OUTPATIENT
Start: 2023-08-11

## 2023-08-11 NOTE — TELEPHONE ENCOUNTER
"Patient called to report she was seen in the ER in [place], records in [facility] for review.  She reports they would not give her an \"antiarrhythmic\" there because she has a cardiologist in [place].  She reports today:    Symptoms she is having, HR going high again, now is normal, when she woke up she could \"feel\" it out of rhythm, BP was 150/77am, HR 59, she got up and took her medication, the feeling went away, coreg 25 mg BID is what medication she is on.   "

## 2023-08-11 NOTE — TELEPHONE ENCOUNTER
DR. Martinez wants to start her on amiodarone 200 mg BID and have her come in Wednesday with an EKG     Patient advised of above, will send RX in to [facility] in [place] at patient request.

## 2023-08-14 ENCOUNTER — TELEPHONE (OUTPATIENT)
Dept: CARDIOLOGY | Facility: CLINIC | Age: 83
End: 2023-08-14
Payer: MEDICARE

## 2023-08-14 VITALS
HEIGHT: 66 IN | TEMPERATURE: 98 F | OXYGEN SATURATION: 98 % | SYSTOLIC BLOOD PRESSURE: 157 MMHG | BODY MASS INDEX: 21.69 KG/M2 | DIASTOLIC BLOOD PRESSURE: 50 MMHG | RESPIRATION RATE: 16 BRPM | WEIGHT: 135 LBS | HEART RATE: 60 BPM

## 2023-08-14 PROCEDURE — 93005 ELECTROCARDIOGRAM TRACING: CPT | Performed by: EMERGENCY MEDICINE

## 2023-08-14 PROCEDURE — 99283 EMERGENCY DEPT VISIT LOW MDM: CPT

## 2023-08-14 PROCEDURE — 93010 ELECTROCARDIOGRAM REPORT: CPT | Performed by: INTERNAL MEDICINE

## 2023-08-14 NOTE — TELEPHONE ENCOUNTER
"    Caller: Kelsey Avendano \"Pat\"    Relationship: Self    Best call back number: [phone]    What medications are you currently taking:   Current Outpatient Medications on File Prior to Visit   Medication Sig Dispense Refill    acetaminophen (TYLENOL) 650 MG 8 hr tablet Take 650 mg by mouth As Needed for Mild Pain.      amiodarone (PACERONE) 200 MG tablet Take 1 tablet by mouth 2 (Two) Times a Day. 60 tablet 1    apixaban (ELIQUIS) 2.5 MG tablet tablet Take 2.5 mg by mouth 2 (Two) Times a Day.      carvedilol (COREG) 25 MG tablet Take 25 mg by mouth 2 (Two) Times a Day With Meals.      cetirizine (zyrTEC) 10 MG tablet Take 10 mg by mouth Daily.      escitalopram (LEXAPRO) 20 MG tablet Take 20 mg by mouth Every Night.      ferrous gluconate 324 (37.5 Fe) MG tablet tablet Take 324 mg by mouth Daily With Breakfast.      furosemide (LASIX) 20 MG tablet Take 10 mg by mouth At Night As Needed.      hydroxychloroquine (PLAQUENIL) 200 MG tablet Take 200 mg by mouth 2 (Two) Times a Day.      pantoprazole (PROTONIX) 40 MG EC tablet Take 40 mg by mouth 2 (two) times a day.      rosuvastatin (CRESTOR) 20 MG tablet Take 20 mg by mouth Every Night.      spironolactone (ALDACTONE) 25 MG tablet Take 25 mg by mouth Daily.       No current facility-administered medications on file prior to visit.          When did you start taking these medications: 8.11.2023    Which medication are you concerned about: AMIODARONE    Who prescribed you this medication: DR. RAMIREZ    What are your concerns: PATIENT FEELS AS IF THE MEDICATION IS CAUSING ARRHYTHMIA, SHE ONLY TOOK THE MEDICATION THAT EVENING. SHE DOES HAVE AN UPCOMING APPOINTMENT ON 8.16.2023 BUT WONDERS IF DR. RAMIREZ WOULD WANT HER TO TRY SOMETHING ELSE.     How long have you had these concerns: 8.11.2023        DELETE AFTER READING TO PATIENT: "Thank you for sharing this information with me. I will send a message to the clinical team. Please allow 48 hours for the clinical " Patient states she is feeling fine today. Has not yet removed bandage but will do so later today. Reinforced post op incision care. Scheduled 2 wk post op device check.    "staff to follow up on this request.  If your symptoms worsen, please seek emergent medical attention."   "

## 2023-08-14 NOTE — TELEPHONE ENCOUNTER
Spoke with patient, advised d/w Dr. Martinez, no new recommendations, will discuss further at appt in 2 days.  Understanding verbalized.

## 2023-08-15 ENCOUNTER — HOSPITAL ENCOUNTER (EMERGENCY)
Facility: HOSPITAL | Age: 83
Discharge: HOME | End: 2023-08-15
Attending: EMERGENCY MEDICINE
Payer: MEDICARE

## 2023-08-15 DIAGNOSIS — F41.9 ANXIETY: ICD-10-CM

## 2023-08-15 DIAGNOSIS — R00.2 PALPITATIONS WITH REGULAR CARDIAC RHYTHM: Primary | ICD-10-CM

## 2023-08-15 LAB
ALBUMIN SERPL-MCNC: 4 G/DL (ref 3.5–5.2)
ALBUMIN/GLOB SERPL: 1.3 G/DL
ALP SERPL-CCNC: 81 U/L (ref 39–117)
ALT SERPL W P-5'-P-CCNC: 22 U/L (ref 1–33)
ANION GAP SERPL CALCULATED.3IONS-SCNC: 9.1 MMOL/L (ref 5–15)
AST SERPL-CCNC: 41 U/L (ref 1–32)
BASOPHILS # BLD AUTO: 0.03 10*3/MM3 (ref 0–0.2)
BASOPHILS NFR BLD AUTO: 0.5 % (ref 0–1.5)
BILIRUB SERPL-MCNC: 0.3 MG/DL (ref 0–1.2)
BUN SERPL-MCNC: 21 MG/DL (ref 8–23)
BUN/CREAT SERPL: 18.3 (ref 7–25)
CALCIUM SPEC-SCNC: 9.6 MG/DL (ref 8.6–10.5)
CHLORIDE SERPL-SCNC: 104 MMOL/L (ref 98–107)
CK SERPL-CCNC: 216 U/L (ref 20–180)
CO2 SERPL-SCNC: 26.9 MMOL/L (ref 22–29)
CREAT SERPL-MCNC: 1.15 MG/DL (ref 0.57–1)
CRP SERPL-MCNC: <0.3 MG/DL (ref 0–0.5)
DEPRECATED RDW RBC AUTO: 48.1 FL (ref 37–54)
EGFRCR SERPLBLD CKD-EPI 2021: 47.7 ML/MIN/1.73
EOSINOPHIL # BLD AUTO: 0.25 10*3/MM3 (ref 0–0.4)
EOSINOPHIL NFR BLD AUTO: 4.3 % (ref 0.3–6.2)
ERYTHROCYTE [DISTWIDTH] IN BLOOD BY AUTOMATED COUNT: 13.4 % (ref 12.3–15.4)
GLOBULIN UR ELPH-MCNC: 3 GM/DL
GLUCOSE SERPL-MCNC: 104 MG/DL (ref 65–99)
HCT VFR BLD AUTO: 36 % (ref 34–46.6)
HGB BLD-MCNC: 11.1 G/DL (ref 12–15.9)
HOLD SPECIMEN: NORMAL
HOLD SPECIMEN: NORMAL
IMM GRANULOCYTES # BLD AUTO: 0.02 10*3/MM3 (ref 0–0.05)
IMM GRANULOCYTES NFR BLD AUTO: 0.3 % (ref 0–0.5)
LYMPHOCYTES # BLD AUTO: 1.24 10*3/MM3 (ref 0.7–3.1)
LYMPHOCYTES NFR BLD AUTO: 21.3 % (ref 19.6–45.3)
MAGNESIUM SERPL-MCNC: 2 MG/DL (ref 1.6–2.4)
MCH RBC QN AUTO: 30.1 PG (ref 26.6–33)
MCHC RBC AUTO-ENTMCNC: 30.8 G/DL (ref 31.5–35.7)
MCV RBC AUTO: 97.6 FL (ref 79–97)
MONOCYTES # BLD AUTO: 0.76 10*3/MM3 (ref 0.1–0.9)
MONOCYTES NFR BLD AUTO: 13.1 % (ref 5–12)
NEUTROPHILS NFR BLD AUTO: 3.52 10*3/MM3 (ref 1.7–7)
NEUTROPHILS NFR BLD AUTO: 60.5 % (ref 42.7–76)
NRBC BLD AUTO-RTO: 0 /100 WBC (ref 0–0.2)
PLATELET # BLD AUTO: 124 10*3/MM3 (ref 140–450)
PMV BLD AUTO: 10.7 FL (ref 6–12)
POTASSIUM SERPL-SCNC: 3.9 MMOL/L (ref 3.5–5.2)
PROT SERPL-MCNC: 7 G/DL (ref 6–8.5)
QT INTERVAL: 456 MS
QTC INTERVAL: 478 MS
RBC # BLD AUTO: 3.69 10*6/MM3 (ref 3.77–5.28)
SODIUM SERPL-SCNC: 140 MMOL/L (ref 136–145)
TSH SERPL DL<=0.05 MIU/L-ACNC: 5.18 UIU/ML (ref 0.27–4.2)
WBC NRBC COR # BLD: 5.82 10*3/MM3 (ref 3.4–10.8)
WHOLE BLOOD HOLD COAG: NORMAL
WHOLE BLOOD HOLD SPECIMEN: NORMAL

## 2023-08-15 PROCEDURE — 36415 COLL VENOUS BLD VENIPUNCTURE: CPT

## 2023-08-15 PROCEDURE — 82550 ASSAY OF CK (CPK): CPT | Performed by: PHYSICIAN ASSISTANT

## 2023-08-15 PROCEDURE — 83735 ASSAY OF MAGNESIUM: CPT | Performed by: PHYSICIAN ASSISTANT

## 2023-08-15 PROCEDURE — 86140 C-REACTIVE PROTEIN: CPT | Performed by: PHYSICIAN ASSISTANT

## 2023-08-15 PROCEDURE — 84443 ASSAY THYROID STIM HORMONE: CPT | Performed by: PHYSICIAN ASSISTANT

## 2023-08-15 PROCEDURE — 80053 COMPREHEN METABOLIC PANEL: CPT | Performed by: PHYSICIAN ASSISTANT

## 2023-08-15 PROCEDURE — 85025 COMPLETE CBC W/AUTO DIFF WBC: CPT | Performed by: PHYSICIAN ASSISTANT

## 2023-08-15 RX ORDER — LORAZEPAM 0.5 MG/1
0.5 TABLET ORAL ONCE
Status: COMPLETED | OUTPATIENT
Start: 2023-08-15 | End: 2023-08-15

## 2023-08-15 RX ADMIN — LORAZEPAM 0.5 MG: 0.5 TABLET ORAL at 01:37

## 2023-08-15 NOTE — ED NOTES
MEDICAL SCREENING:    Reason for Visit: palpatations    Patient initially seen in triage.  The patient was advised further evaluation and diagnostic testing will be needed, some of the treatment and testing will be initiated in the lobby in order to begin the process.  The patient will be returned to the waiting area for the time being and possibly be re-assessed by a subsequent ED provider.  The patient will be brought back to the treatment area in as timely manner as possible.      Julian Carrera II, PA  08/15/23 0010

## 2023-08-15 NOTE — ED PROVIDER NOTES
"Subjective   History of Present Illness  Patient presents to Er  with palpitations    Palpitations  Palpitations quality:  Fast  Onset quality:  Sudden  Duration:  15 minutes  Chronicity:  Recurrent  Context: anxiety    Relieved by:  Nothing  Worsened by:  Nothing  Ineffective treatments:  Beta blockers    Review of Systems   Constitutional:  Positive for activity change.   HENT: Negative.     Eyes: Negative.    Respiratory: Negative.     Cardiovascular: Negative.  Positive for palpitations.   Endocrine: Negative.    Genitourinary: Negative.    Musculoskeletal: Negative.    Allergic/Immunologic: Negative.    Neurological: Negative.    Hematological: Negative.    Psychiatric/Behavioral: Negative.       Past Medical History:   Diagnosis Date    Anemia     Anxiety     Aortic insufficiency     Arthritis     B12 deficiency     Bradycardia     Bronchitis     CHF (congestive heart failure)     Depression 2006    GERD (gastroesophageal reflux disease)     Hypertension     Insomnia     Lupus     MRSA (methicillin resistant staph aureus) culture positive     Osteoporosis     Renal failure     Stroke     TIA    TIA (transient ischemic attack)        Allergies   Allergen Reactions    Maxzide [Hydrochlorothiazide W-Triamterene] Other (See Comments)     Kidney issues 06/30/20      Hydralazine Hcl Photosensitivity, Palpitations, GI Intolerance and Headache     Chest pains, headache, diarrhea    Hytrin [Terazosin] Palpitations     Visual issues- \"bright spots in eyes\" 6/2020    Nifedipine Er Rash     flushing       Past Surgical History:   Procedure Laterality Date    ANTERIOR AND POSTERIOR VAGINAL REPAIR  04/2008    Along with vaginal vault suspension and PULLP    BILATERAL SALPINGO OOPHORECTOMY Bilateral 1984    BREAST BIOPSY Right 1989    benign    CARDIAC CATHETERIZATION  2008    DEEP NECK LYMPH NODE BIOPSY / EXCISION  2002    KNEE ARTHROPLASTY, PARTIAL REPLACEMENT Bilateral     TOTAL ABDOMINAL HYSTERECTOMY FORDE PROCEDURE  " 1979       Family History   Problem Relation Age of Onset    Arthritis Mother     Heart attack Mother     Diabetes Father     Heart attack Father     Heart failure Sister     Heart disease Brother     Heart failure Brother     Heart failure Sister     Breast cancer Neg Hx     Ovarian cancer Neg Hx        Social History     Socioeconomic History    Marital status:    Tobacco Use    Smoking status: Never    Smokeless tobacco: Never   Vaping Use    Vaping Use: Never used   Substance and Sexual Activity    Alcohol use: No    Drug use: No    Sexual activity: Defer           Objective   Physical Exam  Vitals and nursing note reviewed.   Constitutional:       Appearance: Normal appearance.   HENT:      Head: Normocephalic.      Nose: Nose normal.      Mouth/Throat:      Mouth: Mucous membranes are moist.   Eyes:      Pupils: Pupils are equal, round, and reactive to light.   Cardiovascular:      Rate and Rhythm: Bradycardia present.   Pulmonary:      Effort: Pulmonary effort is normal.   Abdominal:      General: Abdomen is flat.   Musculoskeletal:         General: Normal range of motion.      Cervical back: Normal range of motion.   Skin:     General: Skin is warm.      Capillary Refill: Capillary refill takes less than 2 seconds.   Neurological:      General: No focal deficit present.      Mental Status: She is alert.   Psychiatric:         Mood and Affect: Mood normal.       Procedures           ED Course  ED Course as of 08/15/23 0137   Mon Aug 14, 2023   2341 eCG 23:36 NSR, rate 66. Normal eCG. QT/qTc 456/478 [EMMANUEL]      ED Course User Index  [EMMANUEL] Thomas German MD                                           Medical Decision Making  Problems Addressed:  Anxiety: complicated acute illness or injury  Palpitations with regular cardiac rhythm: complicated acute illness or injury    Amount and/or Complexity of Data Reviewed  ECG/medicine tests: ordered.    Risk  Prescription drug management.        Final diagnoses:    Palpitations with regular cardiac rhythm   Anxiety       ED Disposition  ED Disposition       ED Disposition   Discharge    Condition   Stable    Comment   --               Sudhir Arita MD  [address] [facility] [address] [phone]    Schedule an appointment as soon as possible for a visit   If symptoms worsen         Medication List      No changes were made to your prescriptions during this visit.            Thomas German MD  08/15/23 0137

## 2023-08-15 NOTE — ED NOTES
Patient advised to notify staff when feeling the need to void so she may provide a urine specimen.

## 2023-08-16 ENCOUNTER — OFFICE VISIT (OUTPATIENT)
Dept: CARDIOLOGY | Facility: CLINIC | Age: 83
End: 2023-08-16
Payer: MEDICARE

## 2023-08-16 VITALS
BODY MASS INDEX: 22.02 KG/M2 | HEART RATE: 91 BPM | WEIGHT: 137 LBS | SYSTOLIC BLOOD PRESSURE: 136 MMHG | HEIGHT: 66 IN | DIASTOLIC BLOOD PRESSURE: 60 MMHG | OXYGEN SATURATION: 96 %

## 2023-08-16 DIAGNOSIS — I50.33 ACUTE ON CHRONIC DIASTOLIC CHF (CONGESTIVE HEART FAILURE): ICD-10-CM

## 2023-08-16 DIAGNOSIS — I10 ESSENTIAL HYPERTENSION: ICD-10-CM

## 2023-08-16 DIAGNOSIS — I48.91 ATRIAL FIBRILLATION WITH RVR: Primary | ICD-10-CM

## 2023-08-16 NOTE — PROGRESS NOTES
ubjective:     Encounter Date:08/16/2023    Primary Care Physician: Sudhir Arita MD      Patient ID: Kelsey Avendano is a 83 y.o. female.    Chief Complaint:Follow-up and Abnormal ECG (ekg)                               Atrial Fibrillation  PROBLEM LIST:  1.   Problem List:  TIA; on aspirin (not daily); right sided visual deficit:  MRI, 02/28/2018: Age-appropriate diffuse generalized cerebral atrophy with gliosis in the white matter and ill defined oat infarct in the cerebellum on the left with no acute areas of ischemia  MRA, 02/28/2018: Neck, slightly tortuous vessels consistent with atherosclerotic changes and no focal areas of stenosis. Antegrade flow noted bilaterally.  Echocardiogram, 02/28/2018: EF 60-65%, grade 2 diastolic dysfunction with mildly dilated right atrial cavity is moderate pulmonary hypertension.  Holter 3/2018: Normal sinus rhythm/sinus bradycardia at 58 bpm.  JEANETTE, 11/06/2018: EF 60%. Moderate AI, aortic valve leaflets appear rheumatic. Mild-to-moderate MR/TR. Most likely source of patient's TIA is aortic atheroma.  Paroxysmal atrial flutter  5/10/2022 echo EF 61 to 65%.  Right atrium moderately dilated, left atrium moderately volume increased.  Moderate MR, moderate TR.  RVSP 35 to 45 mmHg.  Mild LVH.  Treated at [facility] with IV diltiazem, eventually discharged on NOAC with plan for outpatient external cardioversion.  5/18/2022 return for revisit in sinus bradycardia.  Digoxin discontinued secondary to CKD and elevated serum level.  [facility] ED presentation 8/8 with EKG revealing Atrial Fibrillation. Converted to NSR without intervention.  [facility] ED presentation 8/14 with palpitations- EKG revealing NSR.  Aortic insufficiency:  JEANETTE, 11/06/2018: EF 60%. Moderate AI, aortic valve leaflets appear rheumatic. Mild-to-moderate MR/TR.   Echocardiogram, 09/17/2019: EF 60%. Mild-to-mod AI. Trace-to-mild MR. Mild TR.  Chronic diastolic heart failure  [facility] in 2008: Reportedly  "normal, data insufficient  Hypertension.  Hyperlipidemia.  CKD  Hx CVA.  GERD.  Lupus.  Anxiety/depression  Surgeries:  Partial replacement bilateral knees  Vaginal surgery  Breast biopsy  Total hysterectomy  Knee replacement    Past Medical History:   Diagnosis Date    Anemia     Anxiety     Aortic insufficiency     Arthritis     B12 deficiency     Bradycardia     Bronchitis     CHF (congestive heart failure)     Depression 2006    GERD (gastroesophageal reflux disease)     Hypertension     Insomnia     Lupus     MRSA (methicillin resistant staph aureus) culture positive     Osteoporosis     Renal failure     Stroke     TIA    TIA (transient ischemic attack)      Past Surgical History:   Procedure Laterality Date    ANTERIOR AND POSTERIOR VAGINAL REPAIR  04/2008    Along with vaginal vault suspension and PULLP    BILATERAL SALPINGO OOPHORECTOMY Bilateral 1984    BREAST BIOPSY Right 1989    benign    CARDIAC CATHETERIZATION  2008    DEEP NECK LYMPH NODE BIOPSY / EXCISION  2002    KNEE ARTHROPLASTY, PARTIAL REPLACEMENT Bilateral     TOTAL ABDOMINAL HYSTERECTOMY FORDE PROCEDURE  1979       Allergies   Allergen Reactions    Maxzide [Hydrochlorothiazide W-Triamterene] Other (See Comments)     Kidney issues 06/30/20      Hydralazine Hcl Photosensitivity, Palpitations, GI Intolerance and Headache     Chest pains, headache, diarrhea    Hytrin [Terazosin] Palpitations     Visual issues- \"bright spots in eyes\" 6/2020    Nifedipine Er Rash     flushing         Current Outpatient Medications:     acetaminophen (TYLENOL) 650 MG 8 hr tablet, Take 1 tablet by mouth As Needed for Mild Pain., Disp: , Rfl:     apixaban (ELIQUIS) 2.5 MG tablet tablet, Take 1 tablet by mouth 2 (Two) Times a Day., Disp: , Rfl:     carvedilol (COREG) 25 MG tablet, Take 1 tablet by mouth 2 (Two) Times a Day With Meals., Disp: , Rfl:     cetirizine (zyrTEC) 10 MG tablet, Take 1 tablet by mouth Daily., Disp: , Rfl:     escitalopram (LEXAPRO) 20 MG tablet, " Take 1 tablet by mouth Every Night., Disp: , Rfl:     ferrous gluconate 324 (37.5 Fe) MG tablet tablet, Take 1 tablet by mouth Daily With Breakfast., Disp: , Rfl:     furosemide (LASIX) 20 MG tablet, Take 0.5 tablets by mouth At Night As Needed., Disp: , Rfl:     hydroxychloroquine (PLAQUENIL) 200 MG tablet, Take 1 tablet by mouth 2 (Two) Times a Day., Disp: , Rfl:     pantoprazole (PROTONIX) 40 MG EC tablet, Take 1 tablet by mouth 2 (two) times a day., Disp: , Rfl:     rosuvastatin (CRESTOR) 20 MG tablet, Take 1 tablet by mouth Every Night., Disp: , Rfl:         History of Present Illness  Mrs. Avendano is an 83 yr old white female who presents for work-in follow up of atrial fibrillation.  She has been seen in the [facility] ED on 2 occasions.  The first was on 8/8/23 and her HR was 145 with a B/P of 200/110.  She converted on her own.  She has had daily episodes of an irregular HR lasting at least 15-20 minutes.  She notes symptoms of fatigue and weakness.  She took one tablet of the Amiodarone that we ordered,but had an episode of atrial fibrillation and discontinued the medication.  Dr. Martinez recommended that she restart the Amiodarone at 400 mg po bid for 1 week and to increase her Eliquis to 5mg po bid.  Pulmonary Vein Ablation vs AV node ablation vs Tikosyn initiation.      The following portions of the patient's history were reviewed and updated as appropriate: allergies, current medications, past family history, past medical history, past social history, past surgical history and problem list.    Family History   Problem Relation Age of Onset    Arthritis Mother     Heart attack Mother     Diabetes Father     Heart attack Father     Heart failure Sister     Heart disease Brother     Heart failure Brother     Heart failure Sister     Breast cancer Neg Hx     Ovarian cancer Neg Hx        Social History     Tobacco Use    Smoking status: Never    Smokeless tobacco: Never   Vaping Use    Vaping Use: Never  "used   Substance Use Topics    Alcohol use: No    Drug use: No         Review of Systems   Constitutional: Positive for malaise/fatigue.   Cardiovascular:  Positive for irregular heartbeat and palpitations.   Endocrine: Negative.    Hematologic/Lymphatic: Negative.    Gastrointestinal: Negative.    Genitourinary: Negative.    Neurological: Negative.         Objective:   /60   Pulse 91   Ht 167.6 cm (66\")   Wt 62.1 kg (137 lb)   SpO2 96%   BMI 22.11 kg/mý         Vitals reviewed.   Constitutional:       Appearance: Healthy appearance. Not in distress.   Pulmonary:      Effort: Pulmonary effort is normal.      Breath sounds: Normal breath sounds.   Cardiovascular:      PMI at left midclavicular line. Normal rate. Regular rhythm. Normal S1. Normal S2.       Murmurs: There is no murmur.      No gallop.  No click. No rub.   Pulses:     Intact distal pulses.   Skin:     General: Skin is warm and dry.   Neurological:      General: No focal deficit present.      Mental Status: Alert and oriented to person, place and time.         ECG 12 Lead    Date/Time: 8/16/2023 3:45 PM  Performed by: Dg Martinez MD  Authorized by: Dg Martinez MD   Comparison: compared with previous ECG from 8/8/2023  Comparison to previous ECG: Atrial fibrillation has resolved  Rhythm: sinus rhythm  Ectopy: atrial premature contractions  Rate: normal  BPM: 91  Conduction: 1st degree AV block    Clinical impression: abnormal EKG              Assessment:   Assessment & Plan      Diagnoses and all orders for this visit:    1. Atrial fibrillation with RVR (Primary)    2. Essential hypertension    3. Acute on chronic diastolic CHF (congestive heart failure)    Other orders  -     ECG 12 Lead    Plan:  Restart Amiodarone at 400 mg po bid X 1 week, then decrease to 200 mg po bid.  Increase Eliquis to 5 mg po bid.  1 month of samples given to pt today.  Monitor Blood pressure readings at home on a regular basis.  If recurrent atrial " fibrillation on Amiodarone therapy will consider Tikosyn initiation vs AV node ablation/PPM implant.  Follow up in 4-5 weeks in [facility].      Scribed for Dg Martinez MD by Sera Stovall RN. 8/16/2023  16:03 EDT    I have seen and examined the patient, I have reviewed the note, discussed the case with the advance practice clinician, made necessary changes and I agree with the final note.    Dg Martinez MD  08/16/23  16:04 EDT                 Advance Care Planning   ACP discussion was held with the patient during this visit. Patient has an advance directive (not in EMR), copy requested.        Dictated utilizing Dragon dictation

## 2023-09-07 ENCOUNTER — HOSPITAL ENCOUNTER (OUTPATIENT)
Dept: GENERAL RADIOLOGY | Facility: HOSPITAL | Age: 83
Discharge: HOME | End: 2023-09-07
Admitting: NURSE PRACTITIONER
Payer: MEDICARE

## 2023-09-07 ENCOUNTER — TELEPHONE (OUTPATIENT)
Dept: CARDIOLOGY | Facility: CLINIC | Age: 83
End: 2023-09-07
Payer: MEDICARE

## 2023-09-07 DIAGNOSIS — R06.02 SHORTNESS OF BREATH: Primary | ICD-10-CM

## 2023-09-07 DIAGNOSIS — R06.02 SHORTNESS OF BREATH: ICD-10-CM

## 2023-09-07 PROCEDURE — 71046 X-RAY EXAM CHEST 2 VIEWS: CPT

## 2023-09-07 RX ORDER — AMIODARONE HYDROCHLORIDE 200 MG/1
200 TABLET ORAL DAILY
Start: 2023-09-07

## 2023-09-07 NOTE — TELEPHONE ENCOUNTER
Patient called to report she believes she is having side effects of amiodarone.  She reports itching, feeling like she is retaining fluid, shortness of breath and coughing.

## 2023-09-07 NOTE — TELEPHONE ENCOUNTER
Spoke with patient, advised to decrease amiodarone to once daily, get CXR and keep scheduled f/u.  Agreement and understanding verbalized.

## 2023-09-08 ENCOUNTER — TELEPHONE (OUTPATIENT)
Dept: CARDIOLOGY | Facility: CLINIC | Age: 83
End: 2023-09-08
Payer: MEDICARE

## 2023-09-08 NOTE — TELEPHONE ENCOUNTER
Left VM advising of below  ----- Message from DORIS Nelson sent at 9/8/2023 11:59 AM EDT -----  Dr. Martinez reviewed chest xray. Appears unchanged. Continue with decreased dose of amiodarone. Take her lasix next 2-3 days to see if this helps. Keep scheduled appt.

## 2023-09-15 NOTE — PROGRESS NOTES
Subjective:     Encounter Date:09/20/2023    Primary Care Physician: Sudhir Arita MD      Patient ID: Kelsey Avendano is a 83 y.o. female.    Chief Complaint:No chief complaint on file.      Problem List:  TIA; on aspirin (not daily); right sided visual deficit:  MRI, 02/28/2018: Age-appropriate diffuse generalized cerebral atrophy with gliosis in the white matter and ill defined oat infarct in the cerebellum on the left with no acute areas of ischemia  MRA, 02/28/2018: Neck, slightly tortuous vessels consistent with atherosclerotic changes and no focal areas of stenosis. Antegrade flow noted bilaterally.  Echocardiogram, 02/28/2018: EF 60-65%, grade 2 diastolic dysfunction with mildly dilated right atrial cavity is moderate pulmonary hypertension.  Holter 3/2018: Normal sinus rhythm/sinus bradycardia at 58 bpm.  JEANETTE, 11/06/2018: EF 60%. Moderate AI, aortic valve leaflets appear rheumatic. Mild-to-moderate MR/TR. Most likely source of patient's TIA is aortic atheroma.  Paroxysmal atrial fibrillation/flutter.  Tachybradycardia syndrome  5/10/2022 echo EF 61 to 65%.  Right atrium moderately dilated, left atrium moderately volume increased.  Moderate MR, moderate TR.  RVSP 35 to 45 mmHg.  Mild LVH.  Treated at [facility] with IV diltiazem, eventually discharged on NOAC with plan for outpatient external cardioversion.  5/18/2022 return for revisit in sinus bradycardia.  Digoxin discontinued secondary to CKD and elevated serum level.  [facility] ED presentation 8/8 with EKG revealing Atrial Fibrillation. Converted to NSR without intervention.  [facility] ED presentation 8/14 with palpitations- EKG revealing NSR.  Sinus rhythm maintained on amiodarone however symptomatic bradycardia.  Aortic insufficiency:  JEANETTE, 11/06/2018: EF 60%. Moderate AI, aortic valve leaflets appear rheumatic. Mild-to-moderate MR/TR.   Echocardiogram, 09/17/2019: EF 60%. Mild-to-mod AI. Trace-to-mild MR. Mild TR.  Chronic  "diastolic heart failure  [facility] in 2008: Reportedly normal, data insufficient  Hypertension.  Hyperlipidemia.  CKD  Hx CVA.  GERD.  Lupus.  Anxiety/depression  Surgeries:  Partial replacement bilateral knees  Vaginal surgery  Breast biopsy  Total hysterectomy  Knee replacement      Allergies   Allergen Reactions    Maxzide [Hydrochlorothiazide W-Triamterene] Other (See Comments)     Kidney issues 06/30/20      Hydralazine Hcl Photosensitivity, Palpitations, GI Intolerance and Headache     Chest pains, headache, diarrhea    Hytrin [Terazosin] Palpitations     Visual issues- \"bright spots in eyes\" 6/2020    Nifedipine Er Rash     flushing         Current Outpatient Medications:     acetaminophen (TYLENOL) 650 MG 8 hr tablet, Take 1 tablet by mouth As Needed for Mild Pain., Disp: , Rfl:     amiodarone (PACERONE) 200 MG tablet, Take 1 tablet by mouth Daily., Disp: , Rfl:     apixaban (ELIQUIS) 2.5 MG tablet tablet, Take 1 tablet by mouth 2 (Two) Times a Day., Disp: , Rfl:     carvedilol (COREG) 25 MG tablet, Take 1 tablet by mouth 2 (Two) Times a Day With Meals., Disp: , Rfl:     cetirizine (zyrTEC) 10 MG tablet, Take 1 tablet by mouth Daily., Disp: , Rfl:     escitalopram (LEXAPRO) 20 MG tablet, Take 1 tablet by mouth Every Night., Disp: , Rfl:     ferrous gluconate 324 (37.5 Fe) MG tablet tablet, Take 1 tablet by mouth Daily With Breakfast., Disp: , Rfl:     furosemide (LASIX) 20 MG tablet, Take 0.5 tablets by mouth At Night As Needed., Disp: , Rfl:     hydroxychloroquine (PLAQUENIL) 200 MG tablet, Take 1 tablet by mouth 2 (Two) Times a Day., Disp: , Rfl:     pantoprazole (PROTONIX) 40 MG EC tablet, Take 1 tablet by mouth 2 (two) times a day., Disp: , Rfl:     rosuvastatin (CRESTOR) 20 MG tablet, Take 1 tablet by mouth Every Night., Disp: , Rfl:         History of Present Illness    Patient returns today for follow-up of atrial fibrillation and tachybradycardia syndrome.  Since her last visit, she was started on " amiodarone due to symptomatic A-fib with mildly elevated heart rates.  With this she noted she is maintained in sinus rhythm, however became significantly bradycardic.  For this her amiodarone was decreased to 200 mg daily.  Over the last 2 weeks, despite the decrease in dose, she continues to have lightheadedness and balance constipation dizziness and some dyspnea on exertion.  She thinks her dyspnea is slightly worse than it had been.  She underwent CT scan which showed resolution of her pulmonary findings.  Her heart rate at home is consistently in the low 50s per patient.  She has self decreased her carvedilol dose, without improvement.  No chest pain orthopnea PND.    The following portions of the patient's history were reviewed and updated as appropriate: allergies, current medications, past family history, past medical history, past social history, past surgical history and problem list.    Family History   Problem Relation Age of Onset    Arthritis Mother     Heart attack Mother     Diabetes Father     Heart attack Father     Heart failure Sister     Heart disease Brother     Heart failure Brother     Heart failure Sister     Breast cancer Neg Hx     Ovarian cancer Neg Hx        Social History     Tobacco Use    Smoking status: Never    Smokeless tobacco: Never   Vaping Use    Vaping Use: Never used   Substance Use Topics    Alcohol use: No    Drug use: No         ROS       Objective:   There were no vitals taken for this visit.        Vitals reviewed.   Constitutional:       Appearance: Well-developed and not in distress.   Neck:      Thyroid: No thyromegaly.      Vascular: No carotid bruit or JVD.   Pulmonary:      Breath sounds: Normal breath sounds.   Cardiovascular:      Regular rhythm.      Murmurs: There is a grade 1/6 systolic murmur at the LLSB.      S4 No gallop. No S3 gallop.   Pulses:     Intact distal pulses.      Carotid: 2+ bilaterally.     Radial: 2+ bilaterally.  Edema:     Peripheral edema  "absent.   Abdominal:      General: Bowel sounds are normal.      Palpations: Abdomen is soft. There is no abdominal mass.      Tenderness: There is no abdominal tenderness.   Musculoskeletal:         General: No deformity.      Extremities: No clubbing present.Skin:     General: Skin is warm and dry.      Findings: No rash.   Neurological:      Mental Status: Alert and oriented to person, place, and time.         ECG 12 Lead    Date/Time: 9/20/2023 4:11 PM  Performed by: Dg Martinez MD  Authorized by: Dg Martinez MD   Comparison: compared with previous ECG from 8/16/2023  Comparison to previous ECG: Rate has slowed significantly  Rhythm: sinus bradycardia  Conduction: 1st degree AV block  Other findings: non-specific ST-T wave changes              Assessment:   Assessment & Plan      Diagnoses and all orders for this visit:    1. Tachycardia-bradycardia syndrome (Primary)      1.  Tachycardia bradycardia syndrome.  2.  Currently symptomatic sinus bradycardia on low-dose amiodarone and decreased dose carvedilol.  3.  Previous A-fib/a flutter, both with RVR, and symptomatic.  4.  Mild to moderate aortic insufficiency.  Asymptomatic  5.  Hypertension borderline control currently.    Recommendations:  1.  Long discussion with patient and family regarding treatment options.  Patient notes that she is \"tired of feeling bad\".  And wishes something definitive done about her low heart rate and lack of energy/stamina..  2.  Currently we will decrease her amiodarone to 100 mg daily.  3.  Decrease carvedilol to 6.25 mg twice daily.  If necessary for hypertension we will start another nonrate slowing agent.  4.  Discussed patient given her tachybradycardia syndrome, we will place a permanent pacemaker dual-chamber at patient's earliest convenience.  5.  Further recommendations after the above         Advance Care Planning   ACP discussion was held with the patient during this visit. Patient has an advance directive " (not in EMR), copy requested.      Dg Martinez MD    Dictated utilizing Dragon dictation

## 2023-09-19 ENCOUNTER — HOSPITAL ENCOUNTER (OUTPATIENT)
Dept: CT IMAGING | Facility: HOSPITAL | Age: 83
Discharge: HOME | End: 2023-09-19
Admitting: INTERNAL MEDICINE
Payer: MEDICARE

## 2023-09-19 DIAGNOSIS — R91.8 PULMONARY NODULES: ICD-10-CM

## 2023-09-19 LAB — CREAT BLDA-MCNC: 1.4 MG/DL (ref 0.6–1.3)

## 2023-09-19 PROCEDURE — 71250 CT THORAX DX C-: CPT

## 2023-09-19 PROCEDURE — 82565 ASSAY OF CREATININE: CPT

## 2023-09-20 ENCOUNTER — OFFICE VISIT (OUTPATIENT)
Dept: CARDIOLOGY | Facility: CLINIC | Age: 83
End: 2023-09-20
Payer: MEDICARE

## 2023-09-20 VITALS
BODY MASS INDEX: 22.6 KG/M2 | OXYGEN SATURATION: 97 % | HEART RATE: 56 BPM | DIASTOLIC BLOOD PRESSURE: 55 MMHG | WEIGHT: 140.6 LBS | HEIGHT: 66 IN | SYSTOLIC BLOOD PRESSURE: 147 MMHG

## 2023-09-20 DIAGNOSIS — I48.92 PAROXYSMAL ATRIAL FLUTTER: ICD-10-CM

## 2023-09-20 DIAGNOSIS — I49.5 TACHYCARDIA-BRADYCARDIA SYNDROME: Primary | ICD-10-CM

## 2023-09-20 PROCEDURE — 1159F MED LIST DOCD IN RCRD: CPT | Performed by: INTERNAL MEDICINE

## 2023-09-20 PROCEDURE — 93000 ELECTROCARDIOGRAM COMPLETE: CPT | Performed by: INTERNAL MEDICINE

## 2023-09-20 PROCEDURE — 3078F DIAST BP <80 MM HG: CPT | Performed by: INTERNAL MEDICINE

## 2023-09-20 PROCEDURE — 99214 OFFICE O/P EST MOD 30 MIN: CPT | Performed by: INTERNAL MEDICINE

## 2023-09-20 PROCEDURE — 1160F RVW MEDS BY RX/DR IN RCRD: CPT | Performed by: INTERNAL MEDICINE

## 2023-09-20 PROCEDURE — 3077F SYST BP >= 140 MM HG: CPT | Performed by: INTERNAL MEDICINE

## 2023-09-20 RX ORDER — CARVEDILOL 6.25 MG/1
6.25 TABLET ORAL 2 TIMES DAILY
Qty: 60 TABLET | Refills: 11 | Status: SHIPPED | OUTPATIENT
Start: 2023-09-20

## 2023-09-20 RX ORDER — AMIODARONE HYDROCHLORIDE 100 MG/1
100 TABLET ORAL DAILY
Qty: 30 TABLET | Refills: 5 | Status: SHIPPED | OUTPATIENT
Start: 2023-09-20

## 2023-10-13 NOTE — PROGRESS NOTES
Electrophysiology Clinic Consult     Kelsey Avendano  1940  [unfilled]  [unfilled]    10/18/23    DATE OF ADMISSION: (Not on file)  [facility] CARDIOLOGY    Sudhir Arita MD  [address] / [place] [address]  Referring Provider: Dg Martinez MD     Chief Complaint   Patient presents with    Tachycardia-bradycardia syndrome     Problem List:  Paroxysmal atrial fibrillation/flutter/tachybradycardia syndrome  CHADSvasc = 7   5/10/2022 echo EF 61 to 65%.  Right atrium moderately dilated, left atrium moderately volume increased.  Moderate MR, moderate TR.  RVSP 35 to 45 mmHg.  Mild LVH.  Treated at [facility] with IV diltiazem, eventually discharged on NOAC with plan for outpatient external cardioversion.  5/18/2022 return for revisit in sinus bradycardia.  Digoxin discontinued secondary to CKD and elevated serum level.  [facility] ED presentation 8/8/23 with EKG revealing Atrial Fibrillation. Converted to NSR without intervention.  [facility] ED presentation 8/14/23 with palpitations- EKG revealing NSR  Symptomatic bradycardia on amiodarone  TIA  Occurred in setting of inconsistent aspirin use, right-sided visual deficit (2017)  MRI, 02/28/2018: Age-appropriate diffuse generalized cerebral atrophy with gliosis in the white matter and ill defined oat infarct in the cerebellum on the left with no acute areas of ischemia  MRA, 02/28/2018: Neck, slightly tortuous vessels consistent with atherosclerotic changes and no focal areas of stenosis. Antegrade flow noted bilaterally.  Echocardiogram, 02/28/2018: EF 60-65%, grade 2 diastolic dysfunction with mildly dilated right atrial cavity is moderate pulmonary hypertension.  Holter 3/2018: Normal sinus rhythm/sinus bradycardia at 58 bpm.  JEANETTE, 11/06/2018: EF 60%. Moderate AI, aortic valve leaflets appear rheumatic. Mild-to-moderate MR/TR. Most likely source of patient's TIA is aortic atheroma  Aortic insufficiency:  JEANETTE,  "11/06/2018: EF 60%. Moderate AI, aortic valve leaflets appear rheumatic. Mild-to-moderate MR/TR.   Echocardiogram, 09/17/2019: EF 60%. Mild-to-mod AI. Trace-to-mild MR. Mild TR.  Chronic diastolic heart failure  [facility] in 2008: Reportedly normal, data insufficient  Hypertension  Hyperlipidemia  Chronic kidney disease  GERD  Lupus  Anxiety/depression  Surgical history  Partial bilateral knee replacements  Vaginal surgery  Breast biopsy  Total hysterectomy  Knee replacement        History of Present Illness:   Kelsey Avendano is an 83-year-old female with above-stated past medical history presents today in consultation, referred by Dr. Martinez for further evaluation and management of atrial fibrillation with associated tachybradycardia syndrome.  She was diagnosed with atrial fibrillation in May 2022 and has been treated with, diltiazem, digoxin, and most recently amiodarone. When in AFIB, she felt palpitations and SOB, moderate in nature. Despite staying in normal sinus rhythm with amiodarone, she is symptomatic with bradycardia with symptoms of shortness of breath fatigue, and dizziness. She has also had symptoms of balance issues, constipation, and SOB since being on amiodarone for the past two months. Her amiodarone has been reduced to 100 mg daily about a month ago and also carvedilol reduced as well. When she takes her Coreg, she feels extremely tired.  Her heart rates at home have been in the 50s. She feels like she \"can barely get around\". She has not worn a monitor and had a GXT stress test, but checks her HRs daily, which she states are always in the 50s, at times, in the high 40s. BP generally runs 130-140s mmHg systolic, but in the last few days, it has been in the 170s as she has been cutting her Coreg in half due to fatigue.  She does not think she took her medication this AM.   MADDY: never been tested, she is always tired, she does not sleep well.   TSH:  8/15/2023 5.180 (high)  Tobacco: " "none  EtOH:none  Caffeine: 1 cup coffee per day       Allergies   Allergen Reactions    Maxzide [Hydrochlorothiazide W-Triamterene] Other (See Comments)     Kidney issues 06/30/20      Hydralazine Hcl Photosensitivity, Palpitations, GI Intolerance and Headache     Chest pains, headache, diarrhea    Hytrin [Terazosin] Palpitations     Visual issues- \"bright spots in eyes\" 6/2020    Nifedipine Er Rash     flushing        Cannot display prior to admission medications because the patient has not been admitted in this contact.              Current Outpatient Medications:     acetaminophen (TYLENOL) 650 MG 8 hr tablet, Take 1 tablet by mouth As Needed for Mild Pain., Disp: , Rfl:     amiodarone (PACERONE) 100 MG tablet, Take 1 tablet by mouth Daily., Disp: 30 tablet, Rfl: 5    apixaban (ELIQUIS) 5 MG tablet tablet, Take 1 tablet by mouth 2 (Two) Times a Day., Disp: , Rfl:     carvedilol (COREG) 6.25 MG tablet, Take 1 tablet by mouth 2 (Two) Times a Day., Disp: 60 tablet, Rfl: 11    cetirizine (zyrTEC) 10 MG tablet, Take 1 tablet by mouth Daily., Disp: , Rfl:     escitalopram (LEXAPRO) 20 MG tablet, Take 1 tablet by mouth Every Night., Disp: , Rfl:     ferrous gluconate 324 (37.5 Fe) MG tablet tablet, Take 1 tablet by mouth Daily With Breakfast., Disp: , Rfl:     furosemide (LASIX) 20 MG tablet, Take 0.5 tablets by mouth At Night As Needed., Disp: , Rfl:     hydroxychloroquine (PLAQUENIL) 200 MG tablet, Take 1 tablet by mouth 2 (Two) Times a Day., Disp: , Rfl:     pantoprazole (PROTONIX) 40 MG EC tablet, Take 1 tablet by mouth 2 (two) times a day., Disp: , Rfl:     rosuvastatin (CRESTOR) 20 MG tablet, Take 1 tablet by mouth Every Night., Disp: , Rfl:     Social History     Socioeconomic History    Marital status:    Tobacco Use    Smoking status: Never    Smokeless tobacco: Never   Vaping Use    Vaping Use: Never used   Substance and Sexual Activity    Alcohol use: No    Drug use: No    Sexual activity: Defer " "      Family History   Problem Relation Age of Onset    Arthritis Mother     Heart attack Mother     Diabetes Father     Heart attack Father     Heart failure Sister     Heart disease Brother     Heart failure Brother     Heart failure Sister     Breast cancer Neg Hx     Ovarian cancer Neg Hx        REVIEW OF SYSTEMS:   CONST:  No weight loss, fever, chills, + weakness + fatigue.   HEENT:  No visual loss, blurred vision, double vision, yellow sclerae.                   No hearing loss, congestion, sore throat.   SKIN:      No rashes, urticaria, ulcers, sores.     RESP:     + shortness of breath, - hemoptysis, cough, sputum.   GI:           No anorexia, nausea, vomiting, diarrhea. No abdominal pain, melena.   :         No burning on urination, hematuria or increased frequency.  ENDO:    No diaphoresis, cold or heat intolerance. No polyuria or polydipsia.   NEURO:  No headache, dizziness, syncope, paralysis, ataxia, or parasthesias.                  No change in bowel or bladder control. No history of CVA/TIA  MUSC:    No muscle, back pain, joint pain or stiffness.   HEME:    No anemia, bleeding, bruising. No history of DVT/PE.  PSYCH:  No history of depression, anxiety    Vitals:    10/18/23 1419 10/18/23 1441   BP: (!) 184/68 150/70   BP Location: Left arm    Patient Position: Sitting    Cuff Size: Adult    Pulse: 52    SpO2: 95%    Weight: 64 kg (141 lb)    Height: 167.6 cm (66\")                  Physical Exam:  GEN: Well nourished, well-developed, no acute distress  HEENT: Normocephalic, atraumatic, PERRLA, moist mucous membranes  NECK: Supple, NO JVD, no thyromegaly, no lymphadenopathy  CARD: Regular rate rhythm normal S1-S2.  There is an S4.  No murmurs are appreciated.  PMI normal  LUNGS: Clear to auscultation, normal respiratory effort  ABDOMEN: Soft, nontender, normal bowel sounds  EXTREMITIES: No gross deformities, no clubbing, cyanosis, or edema  SKIN: Warm, dry  NEURO: No focal deficits, alert and " oriented x 3  PSYCHIATRIC: Normal affect and mood      I personally viewed and interpreted the patient's EKG/Telemetry/lab data    Lab Results   Component Value Date    GLUCOSE 104 (H) 08/15/2023    CALCIUM 9.6 08/15/2023     08/15/2023    K 3.9 08/15/2023    CO2 26.9 08/15/2023     08/15/2023    BUN 21 08/15/2023    CREATININE 1.40 (H) 09/19/2023    EGFRIFNONA 54 (L) 09/25/2019    BCR 18.3 08/15/2023    ANIONGAP 9.1 08/15/2023     Lab Results   Component Value Date    WBC 5.82 08/15/2023    HGB 11.1 (L) 08/15/2023    HCT 36.0 08/15/2023    MCV 97.6 (H) 08/15/2023     (L) 08/15/2023     Lab Results   Component Value Date    INR 0.96 05/09/2022    PROTIME 13.0 05/09/2022     Lab Results   Component Value Date    TSH 5.180 (H) 08/15/2023                  ECG 12 Lead    Date/Time: 10/18/2023 2:36 PM  Performed by: Omar Gonzalez MD    Authorized by: Omar Gonzalez MD  Comparison: compared with previous ECG from 9/20/2023  Similar to previous ECG  Rhythm: sinus rhythm  BPM: 52  Conduction: 1st degree AV block            ICD-10-CM ICD-9-CM   1. Atrial fibrillation with RVR  I48.91 427.31   2. Tachycardia-bradycardia syndrome  I49.5 427.81       Assessment and Plan:   Paroxysmal Atrial Fibrillation/Tachy-Danielle Syndrome:  - patient with documented atrial fibrillation/LA flutter with rapid ventricular rates, symptomatic in nature, with also bradycardia since treatment with Coreg and Amiodarone. Due to tachy-danielle syndrome, would recommend pacemaker implant and initiation of Tikosyn. She will stop Amiodarone, and will need to be off Amiodarone for at least 6 weeks to allow washout. The risks, benefits, and alternatives of the procedure have been reviewed and the patient wishes to proceed.   -we will work on getting this scheduled.   - CHADSvasc = 7 on Eliquis 5 mg BID. Hold Eliquis 1 day prior.     2. HTN:  - not well controlled on Coreg (also having side effects to Coreg) Will add Norvasc 5 mg  QAM.     Electronically signed by ARACELI Yao, 10/18/23, 2:25 PM EDT.    I, Omar Gonzalez MD, personally performed the services described in this documentation as scribed by the above named individual in my presence, and it is both accurate and complete.  10/18/2023  14:59 EDT

## 2023-10-18 ENCOUNTER — OFFICE VISIT (OUTPATIENT)
Dept: CARDIOLOGY | Facility: CLINIC | Age: 83
End: 2023-10-18
Payer: MEDICARE

## 2023-10-18 VITALS
HEART RATE: 52 BPM | DIASTOLIC BLOOD PRESSURE: 70 MMHG | OXYGEN SATURATION: 95 % | WEIGHT: 141 LBS | HEIGHT: 66 IN | BODY MASS INDEX: 22.66 KG/M2 | SYSTOLIC BLOOD PRESSURE: 150 MMHG

## 2023-10-18 DIAGNOSIS — I49.5 TACHYCARDIA-BRADYCARDIA SYNDROME: ICD-10-CM

## 2023-10-18 DIAGNOSIS — I49.5 TACHYCARDIA-BRADYCARDIA SYNDROME: Primary | ICD-10-CM

## 2023-10-18 DIAGNOSIS — I48.91 ATRIAL FIBRILLATION WITH RVR: Primary | ICD-10-CM

## 2023-10-18 RX ORDER — AMLODIPINE BESYLATE 5 MG/1
5 TABLET ORAL EVERY MORNING
Qty: 30 TABLET | Refills: 6 | Status: SHIPPED | OUTPATIENT
Start: 2023-10-18

## 2023-10-23 ENCOUNTER — TELEPHONE (OUTPATIENT)
Dept: CARDIOLOGY | Facility: CLINIC | Age: 83
End: 2023-10-23

## 2023-10-23 NOTE — TELEPHONE ENCOUNTER
"  Caller: Kelsey Avendano \"Pat\"    Relationship: Self    Best call back number: [phone]     What is the best time to reach you: ANY, IF NEEDED    What was the call regarding: PT WAS CALLING TO INFORM THE OFFICE THAT HER PROCEDURE ON 11-28-23 WILL BE COVERED BY HER INSURANCE. PLEASE REACH OUT IF NEEDED.     Is it okay if the provider responds through MyChart: YES         "

## 2023-11-17 ENCOUNTER — PREP FOR SURGERY (OUTPATIENT)
Dept: OTHER | Facility: HOSPITAL | Age: 83
End: 2023-11-17
Payer: MEDICARE

## 2023-11-17 DIAGNOSIS — I49.5 TACHYCARDIA-BRADYCARDIA SYNDROME: Primary | ICD-10-CM

## 2023-11-17 DIAGNOSIS — I48.91 ATRIAL FIBRILLATION WITH RVR: Primary | ICD-10-CM

## 2023-11-17 RX ORDER — SODIUM CHLORIDE 9 MG/ML
1 INJECTION, SOLUTION INTRAVENOUS CONTINUOUS
OUTPATIENT
Start: 2023-11-17 | End: 2023-11-17

## 2023-11-17 RX ORDER — SODIUM CHLORIDE 0.9 % (FLUSH) 0.9 %
1-10 SYRINGE (ML) INJECTION AS NEEDED
OUTPATIENT
Start: 2023-11-17

## 2023-11-17 RX ORDER — ACETAMINOPHEN 325 MG/1
650 TABLET ORAL EVERY 4 HOURS PRN
OUTPATIENT
Start: 2023-11-17

## 2023-11-17 RX ORDER — SODIUM CHLORIDE 9 MG/ML
40 INJECTION, SOLUTION INTRAVENOUS AS NEEDED
OUTPATIENT
Start: 2023-11-17

## 2023-11-17 RX ORDER — CEFAZOLIN SODIUM 2 G/100ML
2000 INJECTION, SOLUTION INTRAVENOUS ONCE
OUTPATIENT
Start: 2023-11-17 | End: 2023-11-17

## 2023-11-17 RX ORDER — NITROGLYCERIN 0.4 MG/1
0.4 TABLET SUBLINGUAL
OUTPATIENT
Start: 2023-11-17

## 2023-11-17 RX ORDER — SODIUM CHLORIDE 0.9 % (FLUSH) 0.9 %
10 SYRINGE (ML) INJECTION EVERY 12 HOURS SCHEDULED
OUTPATIENT
Start: 2023-11-17

## 2023-11-17 RX ORDER — ONDANSETRON 2 MG/ML
4 INJECTION INTRAMUSCULAR; INTRAVENOUS EVERY 6 HOURS PRN
OUTPATIENT
Start: 2023-11-17

## 2023-11-20 ENCOUNTER — PRE-ADMISSION TESTING (OUTPATIENT)
Dept: PREADMISSION TESTING | Facility: HOSPITAL | Age: 83
End: 2023-11-20
Payer: MEDICARE

## 2023-11-20 DIAGNOSIS — I49.5 TACHYCARDIA-BRADYCARDIA SYNDROME: ICD-10-CM

## 2023-11-20 LAB
ANION GAP SERPL CALCULATED.3IONS-SCNC: 8 MMOL/L (ref 5–15)
BUN SERPL-MCNC: 22 MG/DL (ref 8–23)
BUN/CREAT SERPL: 22.2 (ref 7–25)
CALCIUM SPEC-SCNC: 9 MG/DL (ref 8.6–10.5)
CHLORIDE SERPL-SCNC: 107 MMOL/L (ref 98–107)
CO2 SERPL-SCNC: 26 MMOL/L (ref 22–29)
CREAT SERPL-MCNC: 0.99 MG/DL (ref 0.57–1)
DEPRECATED RDW RBC AUTO: 52.1 FL (ref 37–54)
EGFRCR SERPLBLD CKD-EPI 2021: 57 ML/MIN/1.73
ERYTHROCYTE [DISTWIDTH] IN BLOOD BY AUTOMATED COUNT: 13.7 % (ref 12.3–15.4)
GLUCOSE SERPL-MCNC: 116 MG/DL (ref 65–99)
HBA1C MFR BLD: 5 % (ref 4.8–5.6)
HCT VFR BLD AUTO: 28.5 % (ref 34–46.6)
HGB BLD-MCNC: 8.6 G/DL (ref 12–15.9)
INR PPP: 1.58 (ref 0.89–1.12)
MCH RBC QN AUTO: 31.4 PG (ref 26.6–33)
MCHC RBC AUTO-ENTMCNC: 30.2 G/DL (ref 31.5–35.7)
MCV RBC AUTO: 104 FL (ref 79–97)
PLATELET # BLD AUTO: 152 10*3/MM3 (ref 140–450)
PMV BLD AUTO: 10.5 FL (ref 6–12)
POTASSIUM SERPL-SCNC: 4.1 MMOL/L (ref 3.5–5.2)
PROTHROMBIN TIME: 19.1 SECONDS (ref 12.2–14.5)
RBC # BLD AUTO: 2.74 10*6/MM3 (ref 3.77–5.28)
SODIUM SERPL-SCNC: 141 MMOL/L (ref 136–145)
WBC NRBC COR # BLD AUTO: 6.35 10*3/MM3 (ref 3.4–10.8)

## 2023-11-20 PROCEDURE — 36415 COLL VENOUS BLD VENIPUNCTURE: CPT

## 2023-11-20 PROCEDURE — 85027 COMPLETE CBC AUTOMATED: CPT

## 2023-11-20 PROCEDURE — 80048 BASIC METABOLIC PNL TOTAL CA: CPT

## 2023-11-20 PROCEDURE — 83036 HEMOGLOBIN GLYCOSYLATED A1C: CPT

## 2023-11-20 PROCEDURE — 85610 PROTHROMBIN TIME: CPT

## 2023-11-20 RX ORDER — CARVEDILOL 25 MG/1
25 TABLET ORAL 2 TIMES DAILY WITH MEALS
COMMUNITY

## 2023-11-20 RX ORDER — GUAIFENESIN 400 MG/1
200 TABLET ORAL AS NEEDED
COMMUNITY

## 2023-11-28 ENCOUNTER — HOSPITAL ENCOUNTER (INPATIENT)
Facility: HOSPITAL | Age: 83
LOS: 1 days | Discharge: HOME | End: 2023-11-30
Attending: INTERNAL MEDICINE | Admitting: INTERNAL MEDICINE
Payer: MEDICARE

## 2023-11-28 DIAGNOSIS — I48.91 ATRIAL FIBRILLATION WITH RVR: ICD-10-CM

## 2023-11-28 DIAGNOSIS — I49.5 TACHYCARDIA-BRADYCARDIA SYNDROME: ICD-10-CM

## 2023-11-28 DIAGNOSIS — I48.92 PAROXYSMAL ATRIAL FLUTTER: Primary | ICD-10-CM

## 2023-11-28 LAB
ANION GAP SERPL CALCULATED.3IONS-SCNC: 7 MMOL/L (ref 5–15)
APTT PPP: 33.8 SECONDS (ref 22–39)
BASOPHILS # BLD AUTO: 0.04 10*3/MM3 (ref 0–0.2)
BASOPHILS NFR BLD AUTO: 0.7 % (ref 0–1.5)
BUN SERPL-MCNC: 19 MG/DL (ref 8–23)
BUN/CREAT SERPL: 18.4 (ref 7–25)
CA-I SERPL ISE-MCNC: 1.27 MMOL/L (ref 1.12–1.32)
CALCIUM SPEC-SCNC: 8.7 MG/DL (ref 8.6–10.5)
CHLORIDE SERPL-SCNC: 105 MMOL/L (ref 98–107)
CO2 SERPL-SCNC: 29 MMOL/L (ref 22–29)
CREAT SERPL-MCNC: 1.03 MG/DL (ref 0.57–1)
DEPRECATED RDW RBC AUTO: 51.9 FL (ref 37–54)
EGFRCR SERPLBLD CKD-EPI 2021: 54.4 ML/MIN/1.73
EOSINOPHIL # BLD AUTO: 0.24 10*3/MM3 (ref 0–0.4)
EOSINOPHIL NFR BLD AUTO: 4.3 % (ref 0.3–6.2)
ERYTHROCYTE [DISTWIDTH] IN BLOOD BY AUTOMATED COUNT: 13.9 % (ref 12.3–15.4)
FERRITIN SERPL-MCNC: 51.35 NG/ML (ref 13–150)
FOLATE SERPL-MCNC: >20 NG/ML (ref 4.78–24.2)
GLUCOSE SERPL-MCNC: 85 MG/DL (ref 65–99)
HCT VFR BLD AUTO: 27.1 % (ref 34–46.6)
HGB BLD-MCNC: 8.2 G/DL (ref 12–15.9)
IMM GRANULOCYTES # BLD AUTO: 0.02 10*3/MM3 (ref 0–0.05)
IMM GRANULOCYTES NFR BLD AUTO: 0.4 % (ref 0–0.5)
IRON 24H UR-MRATE: 24 MCG/DL (ref 37–145)
IRON SATN MFR SERPL: 6 % (ref 20–50)
LYMPHOCYTES # BLD AUTO: 0.77 10*3/MM3 (ref 0.7–3.1)
LYMPHOCYTES NFR BLD AUTO: 13.7 % (ref 19.6–45.3)
MAGNESIUM SERPL-MCNC: 1.9 MG/DL (ref 1.6–2.4)
MCH RBC QN AUTO: 31.3 PG (ref 26.6–33)
MCHC RBC AUTO-ENTMCNC: 30.3 G/DL (ref 31.5–35.7)
MCV RBC AUTO: 103.4 FL (ref 79–97)
MONOCYTES # BLD AUTO: 0.8 10*3/MM3 (ref 0.1–0.9)
MONOCYTES NFR BLD AUTO: 14.3 % (ref 5–12)
NEUTROPHILS NFR BLD AUTO: 3.74 10*3/MM3 (ref 1.7–7)
NEUTROPHILS NFR BLD AUTO: 66.6 % (ref 42.7–76)
NRBC BLD AUTO-RTO: 0 /100 WBC (ref 0–0.2)
PLATELET # BLD AUTO: 164 10*3/MM3 (ref 140–450)
PMV BLD AUTO: 10.5 FL (ref 6–12)
POTASSIUM SERPL-SCNC: 4.3 MMOL/L (ref 3.5–5.2)
RBC # BLD AUTO: 2.62 10*6/MM3 (ref 3.77–5.28)
RETICS # AUTO: 0.11 10*6/MM3 (ref 0.02–0.13)
RETICS/RBC NFR AUTO: 4.31 % (ref 0.7–1.9)
SODIUM SERPL-SCNC: 141 MMOL/L (ref 136–145)
TIBC SERPL-MCNC: 374 MCG/DL (ref 298–536)
TRANSFERRIN SERPL-MCNC: 251 MG/DL (ref 200–360)
VIT B12 BLD-MCNC: 724 PG/ML (ref 211–946)
WBC NRBC COR # BLD AUTO: 5.61 10*3/MM3 (ref 3.4–10.8)

## 2023-11-28 PROCEDURE — A9270 NON-COVERED ITEM OR SERVICE: HCPCS | Performed by: PHYSICIAN ASSISTANT

## 2023-11-28 PROCEDURE — 82728 ASSAY OF FERRITIN: CPT | Performed by: INTERNAL MEDICINE

## 2023-11-28 PROCEDURE — 25010000002 BUPIVACAINE 0.5 % SOLUTION: Performed by: INTERNAL MEDICINE

## 2023-11-28 PROCEDURE — 85045 AUTOMATED RETICULOCYTE COUNT: CPT | Performed by: INTERNAL MEDICINE

## 2023-11-28 PROCEDURE — 83735 ASSAY OF MAGNESIUM: CPT | Performed by: PHYSICIAN ASSISTANT

## 2023-11-28 PROCEDURE — 63710000001 ESCITALOPRAM 20 MG TABLET: Performed by: PHYSICIAN ASSISTANT

## 2023-11-28 PROCEDURE — 25010000002 MIDAZOLAM PER 1 MG: Performed by: INTERNAL MEDICINE

## 2023-11-28 PROCEDURE — 63710000001 ACETAMINOPHEN 325 MG TABLET: Performed by: INTERNAL MEDICINE

## 2023-11-28 PROCEDURE — 80048 BASIC METABOLIC PNL TOTAL CA: CPT | Performed by: PHYSICIAN ASSISTANT

## 2023-11-28 PROCEDURE — A9270 NON-COVERED ITEM OR SERVICE: HCPCS | Performed by: INTERNAL MEDICINE

## 2023-11-28 PROCEDURE — C1785 PMKR, DUAL, RATE-RESP: HCPCS | Performed by: INTERNAL MEDICINE

## 2023-11-28 PROCEDURE — 25010000002 FENTANYL CITRATE (PF) 50 MCG/ML SOLUTION: Performed by: INTERNAL MEDICINE

## 2023-11-28 PROCEDURE — 85025 COMPLETE CBC W/AUTO DIFF WBC: CPT | Performed by: INTERNAL MEDICINE

## 2023-11-28 PROCEDURE — 33208 INSRT HEART PM ATRIAL & VENT: CPT | Performed by: INTERNAL MEDICINE

## 2023-11-28 PROCEDURE — 93005 ELECTROCARDIOGRAM TRACING: CPT | Performed by: INTERNAL MEDICINE

## 2023-11-28 PROCEDURE — 99213 OFFICE O/P EST LOW 20 MIN: CPT | Performed by: INTERNAL MEDICINE

## 2023-11-28 PROCEDURE — 25010000002 MAGNESIUM SULFATE 4 GM/100ML SOLUTION: Performed by: INTERNAL MEDICINE

## 2023-11-28 PROCEDURE — 63710000001 CARVEDILOL 12.5 MG TABLET: Performed by: PHYSICIAN ASSISTANT

## 2023-11-28 PROCEDURE — 25010000002 CEFAZOLIN PER 500 MG

## 2023-11-28 PROCEDURE — 0JH606Z INSERTION OF PACEMAKER, DUAL CHAMBER INTO CHEST SUBCUTANEOUS TISSUE AND FASCIA, OPEN APPROACH: ICD-10-PCS | Performed by: INTERNAL MEDICINE

## 2023-11-28 PROCEDURE — 63710000001 POLYETHYLENE GLYCOL 17 G PACK: Performed by: INTERNAL MEDICINE

## 2023-11-28 PROCEDURE — 63710000001 PANTOPRAZOLE 40 MG TABLET DELAYED-RELEASE: Performed by: PHYSICIAN ASSISTANT

## 2023-11-28 PROCEDURE — 63710000001 AMLODIPINE 5 MG TABLET: Performed by: PHYSICIAN ASSISTANT

## 2023-11-28 PROCEDURE — 25810000003 SODIUM CHLORIDE 0.9 % SOLUTION: Performed by: INTERNAL MEDICINE

## 2023-11-28 PROCEDURE — 93005 ELECTROCARDIOGRAM TRACING: CPT | Performed by: PHYSICIAN ASSISTANT

## 2023-11-28 PROCEDURE — 83540 ASSAY OF IRON: CPT | Performed by: INTERNAL MEDICINE

## 2023-11-28 PROCEDURE — 25510000001 IOPAMIDOL PER 1 ML: Performed by: INTERNAL MEDICINE

## 2023-11-28 PROCEDURE — 82746 ASSAY OF FOLIC ACID SERUM: CPT | Performed by: INTERNAL MEDICINE

## 2023-11-28 PROCEDURE — 63710000001 DOFETILIDE 250 MCG CAPSULE: Performed by: INTERNAL MEDICINE

## 2023-11-28 PROCEDURE — C1892 INTRO/SHEATH,FIXED,PEEL-AWAY: HCPCS | Performed by: INTERNAL MEDICINE

## 2023-11-28 PROCEDURE — 63710000001 CETIRIZINE 10 MG TABLET: Performed by: PHYSICIAN ASSISTANT

## 2023-11-28 PROCEDURE — 84466 ASSAY OF TRANSFERRIN: CPT | Performed by: INTERNAL MEDICINE

## 2023-11-28 PROCEDURE — 93010 ELECTROCARDIOGRAM REPORT: CPT | Performed by: INTERNAL MEDICINE

## 2023-11-28 PROCEDURE — 82330 ASSAY OF CALCIUM: CPT | Performed by: PHYSICIAN ASSISTANT

## 2023-11-28 PROCEDURE — 99152 MOD SED SAME PHYS/QHP 5/>YRS: CPT | Performed by: INTERNAL MEDICINE

## 2023-11-28 PROCEDURE — 25810000003 SODIUM CHLORIDE 0.9 % SOLUTION: Performed by: PHYSICIAN ASSISTANT

## 2023-11-28 PROCEDURE — 63710000001 ROSUVASTATIN 20 MG TABLET: Performed by: PHYSICIAN ASSISTANT

## 2023-11-28 PROCEDURE — 82607 VITAMIN B-12: CPT | Performed by: INTERNAL MEDICINE

## 2023-11-28 PROCEDURE — 99153 MOD SED SAME PHYS/QHP EA: CPT | Performed by: INTERNAL MEDICINE

## 2023-11-28 PROCEDURE — 25010000002 ONDANSETRON PER 1 MG: Performed by: INTERNAL MEDICINE

## 2023-11-28 PROCEDURE — 85730 THROMBOPLASTIN TIME PARTIAL: CPT | Performed by: PHYSICIAN ASSISTANT

## 2023-11-28 PROCEDURE — 02H63JZ INSERTION OF PACEMAKER LEAD INTO RIGHT ATRIUM, PERCUTANEOUS APPROACH: ICD-10-PCS | Performed by: INTERNAL MEDICINE

## 2023-11-28 PROCEDURE — 63710000001 HYDROXYCHLOROQUINE 200 MG TABLET: Performed by: PHYSICIAN ASSISTANT

## 2023-11-28 PROCEDURE — 02HK3JZ INSERTION OF PACEMAKER LEAD INTO RIGHT VENTRICLE, PERCUTANEOUS APPROACH: ICD-10-PCS | Performed by: INTERNAL MEDICINE

## 2023-11-28 PROCEDURE — C1898 LEAD, PMKR, OTHER THAN TRANS: HCPCS | Performed by: INTERNAL MEDICINE

## 2023-11-28 DEVICE — PACEMAKER
Type: IMPLANTABLE DEVICE | Status: FUNCTIONAL
Brand: ACCOLADE™ MRI DR

## 2023-11-28 DEVICE — STEROX BIPOLAR IS-1 ATRIAL/VENTRICULAR
Type: IMPLANTABLE DEVICE | Status: FUNCTIONAL
Brand: FINELINE® II EZ STEROX

## 2023-11-28 RX ORDER — ACETAMINOPHEN 325 MG/1
325 TABLET ORAL EVERY 4 HOURS PRN
Status: DISCONTINUED | OUTPATIENT
Start: 2023-11-28 | End: 2023-11-28 | Stop reason: SDUPTHER

## 2023-11-28 RX ORDER — HYDROCORTISONE ACETATE 25 MG/1
25 SUPPOSITORY RECTAL 2 TIMES DAILY
Status: DISCONTINUED | OUTPATIENT
Start: 2023-11-28 | End: 2023-11-30 | Stop reason: HOSPADM

## 2023-11-28 RX ORDER — SODIUM CHLORIDE 9 MG/ML
INJECTION, SOLUTION INTRAVENOUS
Status: COMPLETED | OUTPATIENT
Start: 2023-11-28 | End: 2023-11-28

## 2023-11-28 RX ORDER — NITROGLYCERIN 0.4 MG/1
0.4 TABLET SUBLINGUAL
Status: DISCONTINUED | OUTPATIENT
Start: 2023-11-28 | End: 2023-11-28 | Stop reason: SDUPTHER

## 2023-11-28 RX ORDER — CEFAZOLIN SODIUM 2 G/100ML
2000 INJECTION, SOLUTION INTRAVENOUS ONCE
Status: DISCONTINUED | OUTPATIENT
Start: 2023-11-28 | End: 2023-11-28

## 2023-11-28 RX ORDER — ROSUVASTATIN CALCIUM 20 MG/1
20 TABLET, COATED ORAL NIGHTLY
Status: DISCONTINUED | OUTPATIENT
Start: 2023-11-28 | End: 2023-11-30 | Stop reason: HOSPADM

## 2023-11-28 RX ORDER — SODIUM CHLORIDE 0.9 % (FLUSH) 0.9 %
10 SYRINGE (ML) INJECTION EVERY 12 HOURS SCHEDULED
Status: DISCONTINUED | OUTPATIENT
Start: 2023-11-28 | End: 2023-11-28 | Stop reason: HOSPADM

## 2023-11-28 RX ORDER — ESCITALOPRAM OXALATE 20 MG/1
20 TABLET ORAL NIGHTLY
Status: DISCONTINUED | OUTPATIENT
Start: 2023-11-28 | End: 2023-11-30 | Stop reason: HOSPADM

## 2023-11-28 RX ORDER — FENTANYL CITRATE 50 UG/ML
INJECTION, SOLUTION INTRAMUSCULAR; INTRAVENOUS
Status: DISCONTINUED | OUTPATIENT
Start: 2023-11-28 | End: 2023-11-28 | Stop reason: HOSPADM

## 2023-11-28 RX ORDER — SODIUM CHLORIDE 0.9 % (FLUSH) 0.9 %
10 SYRINGE (ML) INJECTION AS NEEDED
Status: DISCONTINUED | OUTPATIENT
Start: 2023-11-28 | End: 2023-11-30 | Stop reason: HOSPADM

## 2023-11-28 RX ORDER — MAGNESIUM SULFATE HEPTAHYDRATE 40 MG/ML
4 INJECTION, SOLUTION INTRAVENOUS ONCE
Status: COMPLETED | OUTPATIENT
Start: 2023-11-28 | End: 2023-11-29

## 2023-11-28 RX ORDER — SODIUM CHLORIDE 9 MG/ML
1 INJECTION, SOLUTION INTRAVENOUS CONTINUOUS
Status: ACTIVE | OUTPATIENT
Start: 2023-11-28 | End: 2023-11-28

## 2023-11-28 RX ORDER — ONDANSETRON 2 MG/ML
4 INJECTION INTRAMUSCULAR; INTRAVENOUS EVERY 6 HOURS PRN
Status: DISCONTINUED | OUTPATIENT
Start: 2023-11-28 | End: 2023-11-28

## 2023-11-28 RX ORDER — CETIRIZINE HYDROCHLORIDE 10 MG/1
10 TABLET ORAL DAILY
Status: DISCONTINUED | OUTPATIENT
Start: 2023-11-28 | End: 2023-11-30 | Stop reason: HOSPADM

## 2023-11-28 RX ORDER — POLYETHYLENE GLYCOL 3350 17 G/17G
17 POWDER, FOR SOLUTION ORAL DAILY
Status: DISCONTINUED | OUTPATIENT
Start: 2023-11-28 | End: 2023-11-30 | Stop reason: HOSPADM

## 2023-11-28 RX ORDER — DOFETILIDE 0.25 MG/1
250 CAPSULE ORAL ONCE
Status: COMPLETED | OUTPATIENT
Start: 2023-11-28 | End: 2023-11-28

## 2023-11-28 RX ORDER — AMLODIPINE BESYLATE 5 MG/1
5 TABLET ORAL 2 TIMES DAILY
Status: DISCONTINUED | OUTPATIENT
Start: 2023-11-28 | End: 2023-11-30 | Stop reason: HOSPADM

## 2023-11-28 RX ORDER — FERROUS SULFATE 325(65) MG
325 TABLET ORAL
Status: DISCONTINUED | OUTPATIENT
Start: 2023-11-29 | End: 2023-11-30 | Stop reason: HOSPADM

## 2023-11-28 RX ORDER — BUPIVACAINE HYDROCHLORIDE 5 MG/ML
INJECTION, SOLUTION PERINEURAL
Status: DISCONTINUED | OUTPATIENT
Start: 2023-11-28 | End: 2023-11-28 | Stop reason: HOSPADM

## 2023-11-28 RX ORDER — HYDROXYCHLOROQUINE SULFATE 200 MG/1
200 TABLET, FILM COATED ORAL 2 TIMES DAILY
Status: DISCONTINUED | OUTPATIENT
Start: 2023-11-28 | End: 2023-11-30 | Stop reason: HOSPADM

## 2023-11-28 RX ORDER — PANTOPRAZOLE SODIUM 40 MG/1
40 TABLET, DELAYED RELEASE ORAL
Status: DISCONTINUED | OUTPATIENT
Start: 2023-11-28 | End: 2023-11-30 | Stop reason: HOSPADM

## 2023-11-28 RX ORDER — SODIUM CHLORIDE 0.9 % (FLUSH) 0.9 %
3 SYRINGE (ML) INJECTION EVERY 12 HOURS SCHEDULED
Status: DISCONTINUED | OUTPATIENT
Start: 2023-11-28 | End: 2023-11-30 | Stop reason: HOSPADM

## 2023-11-28 RX ORDER — SODIUM CHLORIDE 0.9 % (FLUSH) 0.9 %
1-10 SYRINGE (ML) INJECTION AS NEEDED
Status: DISCONTINUED | OUTPATIENT
Start: 2023-11-28 | End: 2023-11-28 | Stop reason: HOSPADM

## 2023-11-28 RX ORDER — ACETAMINOPHEN 650 MG/1
650 SUPPOSITORY RECTAL EVERY 4 HOURS PRN
Status: DISCONTINUED | OUTPATIENT
Start: 2023-11-28 | End: 2023-11-30 | Stop reason: HOSPADM

## 2023-11-28 RX ORDER — ACETAMINOPHEN 325 MG/1
650 TABLET ORAL EVERY 4 HOURS PRN
Status: DISCONTINUED | OUTPATIENT
Start: 2023-11-28 | End: 2023-11-28 | Stop reason: HOSPADM

## 2023-11-28 RX ORDER — ONDANSETRON 2 MG/ML
INJECTION INTRAMUSCULAR; INTRAVENOUS
Status: DISCONTINUED | OUTPATIENT
Start: 2023-11-28 | End: 2023-11-28 | Stop reason: HOSPADM

## 2023-11-28 RX ORDER — CARVEDILOL 12.5 MG/1
25 TABLET ORAL 2 TIMES DAILY WITH MEALS
Status: DISCONTINUED | OUTPATIENT
Start: 2023-11-28 | End: 2023-11-30 | Stop reason: HOSPADM

## 2023-11-28 RX ORDER — SODIUM CHLORIDE 9 MG/ML
40 INJECTION, SOLUTION INTRAVENOUS AS NEEDED
Status: DISCONTINUED | OUTPATIENT
Start: 2023-11-28 | End: 2023-11-28 | Stop reason: HOSPADM

## 2023-11-28 RX ORDER — DOFETILIDE 0.25 MG/1
250 CAPSULE ORAL EVERY 12 HOURS SCHEDULED
Status: DISCONTINUED | OUTPATIENT
Start: 2023-11-29 | End: 2023-11-30

## 2023-11-28 RX ORDER — LIDOCAINE HYDROCHLORIDE 10 MG/ML
INJECTION, SOLUTION EPIDURAL; INFILTRATION; INTRACAUDAL; PERINEURAL
Status: DISCONTINUED | OUTPATIENT
Start: 2023-11-28 | End: 2023-11-28 | Stop reason: HOSPADM

## 2023-11-28 RX ORDER — MIDAZOLAM HYDROCHLORIDE 1 MG/ML
INJECTION INTRAMUSCULAR; INTRAVENOUS
Status: DISCONTINUED | OUTPATIENT
Start: 2023-11-28 | End: 2023-11-28 | Stop reason: HOSPADM

## 2023-11-28 RX ORDER — FUROSEMIDE 20 MG/1
10 TABLET ORAL NIGHTLY PRN
Status: DISCONTINUED | OUTPATIENT
Start: 2023-11-28 | End: 2023-11-30 | Stop reason: HOSPADM

## 2023-11-28 RX ORDER — NITROGLYCERIN 0.4 MG/1
0.4 TABLET SUBLINGUAL
Status: DISCONTINUED | OUTPATIENT
Start: 2023-11-28 | End: 2023-11-30 | Stop reason: HOSPADM

## 2023-11-28 RX ORDER — ONDANSETRON 2 MG/ML
4 INJECTION INTRAMUSCULAR; INTRAVENOUS EVERY 6 HOURS PRN
Status: DISCONTINUED | OUTPATIENT
Start: 2023-11-28 | End: 2023-11-30 | Stop reason: HOSPADM

## 2023-11-28 RX ORDER — DOFETILIDE 0.25 MG/1
250 CAPSULE ORAL ONCE
Status: COMPLETED | OUTPATIENT
Start: 2023-11-29 | End: 2023-11-29

## 2023-11-28 RX ORDER — SODIUM CHLORIDE 9 MG/ML
40 INJECTION, SOLUTION INTRAVENOUS AS NEEDED
Status: DISCONTINUED | OUTPATIENT
Start: 2023-11-28 | End: 2023-11-30 | Stop reason: HOSPADM

## 2023-11-28 RX ORDER — ACETAMINOPHEN 325 MG/1
650 TABLET ORAL EVERY 4 HOURS PRN
Status: DISCONTINUED | OUTPATIENT
Start: 2023-11-28 | End: 2023-11-30 | Stop reason: HOSPADM

## 2023-11-28 RX ADMIN — ROSUVASTATIN 20 MG: 20 TABLET, FILM COATED ORAL at 20:26

## 2023-11-28 RX ADMIN — ACETAMINOPHEN 650 MG: 325 TABLET ORAL at 20:26

## 2023-11-28 RX ADMIN — ESCITALOPRAM OXALATE 20 MG: 20 TABLET ORAL at 20:26

## 2023-11-28 RX ADMIN — CARVEDILOL 25 MG: 12.5 TABLET, FILM COATED ORAL at 14:50

## 2023-11-28 RX ADMIN — HYDROXYCHLOROQUINE SULFATE 200 MG: 200 TABLET ORAL at 20:26

## 2023-11-28 RX ADMIN — AMLODIPINE BESYLATE 5 MG: 5 TABLET ORAL at 20:26

## 2023-11-28 RX ADMIN — MAGNESIUM SULFATE HEPTAHYDRATE 4 G: 40 INJECTION, SOLUTION INTRAVENOUS at 20:24

## 2023-11-28 RX ADMIN — PANTOPRAZOLE SODIUM 40 MG: 40 TABLET, DELAYED RELEASE ORAL at 15:40

## 2023-11-28 RX ADMIN — Medication 3 ML: at 15:32

## 2023-11-28 RX ADMIN — SODIUM CHLORIDE 1 ML/KG/HR: 9 INJECTION, SOLUTION INTRAVENOUS at 10:43

## 2023-11-28 RX ADMIN — DOFETILIDE 250 MCG: 0.25 CAPSULE ORAL at 14:51

## 2023-11-28 RX ADMIN — CETIRIZINE HYDROCHLORIDE 10 MG: 10 TABLET, FILM COATED ORAL at 15:40

## 2023-11-28 RX ADMIN — SODIUM CHLORIDE 2000 MG: 900 INJECTION INTRAVENOUS at 12:51

## 2023-11-28 RX ADMIN — POLYETHYLENE GLYCOL 3350 17 G: 17 POWDER, FOR SOLUTION ORAL at 18:22

## 2023-11-28 RX ADMIN — Medication 3 ML: at 20:28

## 2023-11-28 RX ADMIN — Medication 10 ML: at 10:43

## 2023-11-28 NOTE — CONSULTS
[facility] Medicine Services  CONSULT NOTE      Patient Name: Kelsey Avendano  : 1940  MRN: [id number]    Primary Care Physician: Sudhir Arita MD  Provider requesting consultation: Omar Gonzalez MD    Subjective   Subjective     Reason for Consultation:  anemia    HPI:  Kelsey Avendano is a 83 y.o. female with history of PAF on eliquis, TIA, Diastolic CHF, HTN, HLD, CKD, GERD, Lupus, Anxiety, anemia and depression presents with symptomatic bradycardia and is now S/p  PPM.  The Hospitalist service has been asked to evaluate the patient's anemia.    Ms Avendano states she has been anemic in the past, which she attributes to her hemorrhoids, which bleed frequently with BMs.  She had a colonoscopy with Dr Myles 2-3 months ago with hemorrhoid ligation, however the bleeding resumed soon afterward.  She is sometimes constipated and takes fiber supplement but no laxatives. She has tried anusol suppositories in the past, but none recently.  She denies NSAID use, stopped taking celecoxib one year ago due to elevated creatinine.  Takes and iron pill at home twice daily.      Review of Systems  Gen: no fever  GI: notices some blood with most BMs  All other systems reviewed and are negative.     Personal History     Past Medical History:   Diagnosis Date    Anemia     Annual GYN exam w/o problem 2017    Anxiety     Aortic insufficiency     Arthritis     B12 deficiency     Bradycardia     Bronchitis     CHF (congestive heart failure)     Dental root implant present     x3- bottom and top    Depression 2006    Dizzy     GERD (gastroesophageal reflux disease)     History of shingles     2008- face    History of transfusion     about 2 years ago was told had to have transfusion but doesnt recall details with it - no reaction recalled    Hypertension     Insomnia     Itching     bilat arm    Leaky heart valve     thinks r/t rhuematic fever possible    Lupus     MRSA (methicillin  resistant staph aureus) culture positive     2017    Osteoporosis     Renal failure     Rheumatic fever     as child    SOBOE (shortness of breath on exertion)     Stroke     TIA    TIA (transient ischemic attack)     2017    Wears glasses     readers       Past Surgical History:   Procedure Laterality Date    ANTERIOR AND POSTERIOR VAGINAL REPAIR  04/2008    Along with vaginal vault suspension and PULLP    APPENDECTOMY      posisble with hysterectomy    BILATERAL SALPINGO OOPHORECTOMY Bilateral 1984    BREAST BIOPSY Right 1989    benign    CARDIAC CATHETERIZATION  2008    no stents    CATARACT EXTRACTION, BILATERAL Bilateral     COLONOSCOPY      DEEP NECK LYMPH NODE BIOPSY / EXCISION  2002    ENDOSCOPY      KNEE ARTHROPLASTY, PARTIAL REPLACEMENT Bilateral     TOTAL ABDOMINAL HYSTERECTOMY FORDE PROCEDURE  1979       Family History:  family history includes Arthritis in her mother; Diabetes in her father; Heart attack in her father and mother; Heart disease in her brother; Heart failure in her brother, sister, and sister. Otherwise pertinent FHx was reviewed and unremarkable.     Social History:  reports that she has never smoked. She has never used smokeless tobacco. She reports that she does not drink alcohol and does not use drugs.    Medications:  Apple Cider Vinegar, acetaminophen, amLODIPine, apixaban, carvedilol, cetirizine, escitalopram, ferrous gluconate, furosemide, guaiFENesin, hydroxychloroquine, pantoprazole, and rosuvastatin    Scheduled Meds:amLODIPine, 5 mg, Oral, BID  carvedilol, 25 mg, Oral, BID With Meals  cetirizine, 10 mg, Oral, Daily  [START ON 11/29/2023] dofetilide, 250 mcg, Oral, Once   Followed by  [START ON 11/29/2023] dofetilide, 250 mcg, Oral, Once   Followed by  [START ON 11/29/2023] dofetilide, 250 mcg, Oral, Q12H  escitalopram, 20 mg, Oral, Nightly  [START ON 11/29/2023] ferrous sulfate, 325 mg, Oral, Daily With Breakfast  hydroxychloroquine, 200 mg, Oral, BID  pantoprazole, 40 mg,  "Oral, Q AM  rosuvastatin, 20 mg, Oral, Nightly  sodium chloride, 3 mL, Intravenous, Q12H      Continuous Infusions:Pharmacy Consult - Pharmacy to dose,       PRN Meds:.  acetaminophen **OR** acetaminophen    Calcium Replacement - Follow Nurse / BPA Driven Protocol    furosemide    Magnesium Cardiology Dose Replacement - Follow Nurse / BPA Driven Protocol    nitroglycerin    ondansetron    Pharmacy Consult - Pharmacy to dose    Phosphorus Replacement - Follow Nurse / BPA Driven Protocol    Potassium Replacement - Follow Nurse / BPA Driven Protocol    sodium chloride    sodium chloride    Allergies   Allergen Reactions    Maxzide [Hydrochlorothiazide W-Triamterene] Other (See Comments)     Kidney issues 06/30/20      Hydralazine Hcl Photosensitivity, Palpitations, GI Intolerance and Headache     Chest pains, headache, diarrhea    Hytrin [Terazosin] Palpitations     Visual issues- \"bright spots in eyes\" 6/2020    Nifedipine Er Rash     flushing       Objective   Objective     Vital Signs:   Temp:  [97.5 °F (36.4 °C)] 97.5 °F (36.4 °C)  Heart Rate:  [54-70] 70  Resp:  [11-17] 11  BP: (148-185)/(62-78) 163/63  Flow (L/min):  [2] 2    Physical Exam  Gen:  non toxic, in bed  CV: RRR, pacer site with clean bandage, arm in sling  Pulm: grossly clear bilaterally  GI: abd soft, non tender  Ext: no edema  Neuro: alert, speech clear  Psych: normal affect      Result Review:  I have personally reviewed the results from the time of admission and agree with these findings:  [x]  Laboratory  []  Radiology  []  EKG/Telemetry   []  Pathology  []  Old records  []  Other:  Most notable findings include:     LAB RESULTS:      Lab 11/28/23  1028   WBC 5.61   HEMOGLOBIN 8.2*   HEMATOCRIT 27.1*   PLATELETS 164   NEUTROS ABS 3.74   IMMATURE GRANS (ABS) 0.02   LYMPHS ABS 0.77   MONOS ABS 0.80   EOS ABS 0.24   .4*   APTT 33.8         Lab 11/28/23  1028   SODIUM 141   POTASSIUM 4.3   CHLORIDE 105   CO2 29.0   ANION GAP 7.0   BUN 19 "   CREATININE 1.03*   EGFR 54.4*   GLUCOSE 85   CALCIUM 8.7   IONIZED CALCIUM 1.27   MAGNESIUM 1.9                         Brief Urine Lab Results  (Last result in the past 365 days)        Color   Clarity   Blood   Leuk Est   Nitrite   Protein   CREAT   Urine HCG        08/08/23 1243 Yellow   Clear   Negative   Negative   Negative   Negative                 Microbiology Results (last 10 days)       ** No results found for the last 240 hours. **            EP/CRM Study    Result Date: 11/28/2023  Date of Procedure: 11/28/23 Referring Physician: Siddharth Martinez MD /ELECTROPHYSIOLOGIST: Omar Gonzalez MD PROCEDURE(S) PERFORMED:  Implantation of a dual-chamber permanent pacemaker. Moderate sedation  INDICATIONS FOR PROCDEDURE: 83-year-old white female to history of tachycardia-bradycardia syndrome, symptomatic paroxysmal atrial fibrillation, and symptomatic bradycardia who presents today for DDDR permanent pacemaker implantation and subsequent [facility] admission.  Anesthesia: EP lab moderate sedation I was present with the patient for the duration of the moderate sedation and directly supervised staff who had no other duties except for monitoring the patient for the entire procedure. Name of independent trained observer: Atul Toavr RN Intra-service start time: 1250 Intra-service end time: 1339 MEDICATION(S) GIVEN TO PATIENT DURING THIS PROCEDURE: Versed. Fentanyl. Brevital Ancef. DESCRIPTION(S) OF THE PROCEDURE: The patient was brought to the cardiovascular laboratory in a fasting, non-sedated state. The risks and benefits of conscious sedation and implantation of a dual-chamber permanent pacemaker were explained to the patient and written, informed consent was obtained. The patient was then prepped and draped in the usual sterile manner. The left infraclavicular fossa area was then anesthetized with 1% Lidocaine and the skin was incised using a scalpel. The pocket was created along the prepectoralis fascia  using both sharp and blunt dissection.  A venogram did demonstrate patency of the left subclavian vein system.  Access to the left subclavian vein was then obtained x 2 using the modified Seldinger technique and two, 7-Vietnamese sheaths were passed over 2 guidewires and advanced to the left subclavian vein. The right ventricular and right atrial leads were then passed through the sheaths and advanced to the low right ventricular septum and high right atrium where they were screwed in position. The right ventricular lead pacing threshold was measured at 0.4 volts at 0.5 milliseconds with an R wave measured at 23 millivolts and a lead impedance measured at 710 ohms. The right atrial lead pacing threshold measured at 1.8 volts at 0.5 milliseconds with a P wave measured at 2 millivolts and a lead impedance measured at 320 ohms. The leads were attached to the prepectoralis fascia using a 2-0 silk and a sewing ring. The leads were attached to the generator, and the generator was placed in the pocket after the pocket was irrigated with antibiotic solution. The pocket was closed with 2-0 Vicryl, and the skin was closed with 3-0 Vicryl and 4-0 Vicryl. Steri-Strips and a pressure dressing were then applied. Estimated blood loss was minimal. FINAL PROGRAM PARAMETERS: The device was set at DDDR with a lower rate of 70 beats per minute and upper rate of 120 beats per minute with prolonged A-V delay. FINAL IMPRESSIONS: 1.   Successful implantation of a dual-chamber permanent pacemaker. RECOMMENDATION(S): The patient will be monitored on telemetry.  Patient is to be admitted for [facility] admission. Omar Gonzalez MD 11/28/23 13:45 EST     Results for orders placed during the hospital encounter of 07/18/23    Adult Transthoracic Echo Complete W/ Cont if Necessary Per Protocol    Interpretation Summary    Left ventricular cavity size is normal with mild concentric left ventricular hypertrophy.    Left ventricular systolic function  is normal. Left ventricular ejection fraction appears to be 61 - 65%.    Left ventricular diastolic function was normal.    Moderate aortic valve regurgitation is present.  No evidence of aortic stenosis detected.    Mild mitral regurgitation with mildly dilated left atrium noted.    Mild tricuspid regurgitation with mild pulmonary hypertension noted.    No pericardial effusion detected.      Assessment & Plan   Assessment & Plan     Active Hospital Problems    Diagnosis  POA    **Tachycardia-bradycardia syndrome [I49.5]  Unknown      Resolved Hospital Problems   No resolved problems to display.     Anemia  hemorrhoids  - suspect chronic blood loss due to hemorrhoidal bleeding  - anemia of chronic disease secondary to lupus/CKD may contribute  - check iron/iron sat/ferritin/b12/folate levels  - if iron sat low, consider one dose of IV iron prior to discharge home  - annusol suppositories BID x 7 days  - start daily miralax, continue at discharge  - advised patient to change home oral iron from BID to once daily (iron receptors are downregulated if iron is taken too frequently)  - follow up with PCP in one week with CBC  - follow up with CRS Dr Myles in 2 weeks to discuss further hemorrhoid ligation    Atrial fibrillation  - dofetilide    Symptomatic bradycardia s/p PPM    HTN  HLD  CKD  Lupus  Anxiety and depression  GERD    Thank you for allowing [facility] Medicine Service to provide consultative care for your patient, we will continue to follow while clinically appropriate.    Femi Martinez MD  11/28/23

## 2023-11-28 NOTE — H&P
Cardiology H&P     Kelsey Avendano  1940  [phone]  There is no work phone number on file.    11/28/23    DATE OF ADMISSION: 11/28/2023  [facility] Sudhir Valdovinos MD  [address] / [place] [address]  Referring Provider: Omar Gonzalez MD     CC: Paroxysmal atrial fibrillation/tachybradycardia syndrome    Problem List:  Paroxysmal atrial fibrillation/flutter/tachybradycardia syndrome  CHADSvasc = 7   5/10/2022 echo EF 61 to 65%.  Right atrium moderately dilated, left atrium moderately volume increased.  Moderate MR, moderate TR.  RVSP 35 to 45 mmHg.  Mild LVH.  Treated at [facility] with IV diltiazem, eventually discharged on NOAC with plan for outpatient external cardioversion.  5/18/2022 return for revisit in sinus bradycardia.  Digoxin discontinued secondary to CKD and elevated serum level.  [facility] ED presentation 8/8/23 with EKG revealing Atrial Fibrillation. Converted to NSR without intervention.  [facility] ED presentation 8/14/23 with palpitations- EKG revealing NSR  Symptomatic bradycardia on amiodarone  Amiodarone discontinued October 2023  TIA  Occurred in setting of inconsistent aspirin use, right-sided visual deficit (2017)  MRI, 02/28/2018: Age-appropriate diffuse generalized cerebral atrophy with gliosis in the white matter and ill defined oat infarct in the cerebellum on the left with no acute areas of ischemia  MRA, 02/28/2018: Neck, slightly tortuous vessels consistent with atherosclerotic changes and no focal areas of stenosis. Antegrade flow noted bilaterally.  Echocardiogram, 02/28/2018: EF 60-65%, grade 2 diastolic dysfunction with mildly dilated right atrial cavity is moderate pulmonary hypertension.  Holter 3/2018: Normal sinus rhythm/sinus bradycardia at 58 bpm.  JEANETTE, 11/06/2018: EF 60%. Moderate AI, aortic valve leaflets appear rheumatic. Mild-to-moderate MR/TR. Most likely source of patient's TIA is aortic atheroma  Aortic  "insufficiency:  JEANETTE, 11/06/2018: EF 60%. Moderate AI, aortic valve leaflets appear rheumatic. Mild-to-moderate MR/TR.   Echocardiogram, 09/17/2019: EF 60%. Mild-to-mod AI. Trace-to-mild MR. Mild TR.  Chronic diastolic heart failure  [facility] in 2008: Reportedly normal, data insufficient  Hypertension  Hyperlipidemia  Chronic kidney disease  GERD  Lupus  Anxiety/depression  Surgical history  Partial bilateral knee replacements  Vaginal surgery  Breast biopsy  Total hysterectomy  Knee replacement    History of Present Illness:   Kelsey Avendano is an 83-year-old female with above-stated past medical history who presents today for dual-chamber permanent pacemaker implantation and initiation of Tikosyn.  She was diagnosed with atrial fibrillation in May 2022 and has been treated with diltiazem, digoxin, and most recently amiodarone which was discontinued in October 2023 due to bradycardia which is symptomatic in nature with shortness of breath, fatigue, and dizziness.  She also was having side effects of balance issues, constipation, and shortness of breath with amiodarone.  This is somewhat improved.  She is been taking Coreg and her heart rates at home have been in the 50s.  She states she feels like she \"can barely get around\".  Checks her heart rates daily which are always in the 50s and sometimes in the high 40s.  Her blood pressure generally runs in the 130s to 140s, but in the evening her blood pressure tends to start increasing.  When she is in atrial flutter her heart rates are in the 110s to 120s, and she has symptoms of palpitations, tachycardia, and fatigue.  He has been compliant with her Eliquis and held it 1 day prior to procedure as instructed.      Allergies   Allergen Reactions    Maxzide [Hydrochlorothiazide W-Triamterene] Other (See Comments)     Kidney issues 06/30/20      Hydralazine Hcl Photosensitivity, Palpitations, GI Intolerance and Headache     Chest pains, headache, diarrhea    Hytrin [Terazosin] " "Palpitations     Visual issues- \"bright spots in eyes\" 6/2020    Nifedipine Er Rash     flushing       Prior to Admission Medications       Prescriptions Last Dose Informant Patient Reported? Taking?    acetaminophen (TYLENOL) 650 MG 8 hr tablet 11/27/2023  Yes Yes    Take 1 tablet by mouth As Needed for Mild Pain.    apixaban (ELIQUIS) 5 MG tablet tablet 11/26/2023 Medication Bottle Yes No    Take 1 tablet by mouth 2 (Two) Times a Day.    carvedilol (COREG) 6.25 MG tablet   No No    Take 1 tablet by mouth 2 (Two) Times a Day.    cetirizine (zyrTEC) 10 MG tablet 11/27/2023 Medication Bottle Yes Yes    Take 1 tablet by mouth Daily.    escitalopram (LEXAPRO) 20 MG tablet 11/27/2023 Medication Bottle Yes Yes    Take 1 tablet by mouth Every Night.    ferrous gluconate 324 (37.5 Fe) MG tablet tablet 11/27/2023 Medication Bottle Yes Yes    Take 1 tablet by mouth 2 (Two) Times a Day With Meals.    furosemide (LASIX) 20 MG tablet 11/26/2023 Medication Bottle Yes No    Take 0.5 tablets by mouth At Night As Needed. 1/2 tablet prn    hydroxychloroquine (PLAQUENIL) 200 MG tablet 11/27/2023 Medication Bottle Yes Yes    Take 1 tablet by mouth 2 (Two) Times a Day.    pantoprazole (PROTONIX) 40 MG EC tablet 11/27/2023 Medication Bottle Yes Yes    Take 1 tablet by mouth 2 (two) times a day.    rosuvastatin (CRESTOR) 20 MG tablet 11/27/2023 Medication Bottle Yes Yes    Take 1 tablet by mouth Every Night.    amLODIPine (NORVASC) 5 MG tablet 11/27/2023 Medication Bottle No Yes    Take 1 tablet by mouth Every Morning.    APPLE CIDER VINEGAR PO Past Month Medication Bottle Yes Yes    Take  by mouth Daily. 2 gummies    carvedilol (COREG) 25 MG tablet 11/27/2023 Medication Bottle Yes Yes    Take 1 tablet by mouth 2 (Two) Times a Day With Meals. 1/2 tablet prn depending on bp    guaiFENesin (HUMIBID 3) 400 MG tablet Past Week Medication Bottle Yes Yes    Take 0.5 tablets by mouth As Needed for Cough. 1/2 tablet nightly prn    Multiple " Vitamins-Minerals (HAIR SKIN NAILS PO) Past Month Medication Bottle Yes Yes    Take  by mouth Daily. 2 gummies              Current Facility-Administered Medications:     acetaminophen (TYLENOL) tablet 325 mg, 325 mg, Oral, Q4H PRN, Cathi Taylor PA    acetaminophen (TYLENOL) tablet 650 mg, 650 mg, Oral, Q4H PRN, Cathi Taylor PA    amLODIPine (NORVASC) tablet 5 mg, 5 mg, Oral, BID, Cathi Taylor PA    Calcium Replacement - Follow Nurse / BPA Driven Protocol, , Does not apply, PRN, Cathi Taylor PA    carvedilol (COREG) tablet 25 mg, 25 mg, Oral, BID With Meals, Cathi Taylor PA    ceFAZolin 2000 mg IVPB in 100 mL NS (MBP), 2,000 mg, Intravenous, Once, Teofilo Quezada, PharmD    cetirizine (zyrTEC) tablet 10 mg, 10 mg, Oral, Daily, Cathi Taylor PA    escitalopram (LEXAPRO) tablet 20 mg, 20 mg, Oral, Nightly, Cathi Taylor PA    ferrous sulfate tablet 325 mg, 325 mg, Oral, Daily With Breakfast, Cathi Taylor PA    furosemide (LASIX) tablet 10 mg, 10 mg, Oral, Nightly PRN, Cathi Taylor PA    hydroxychloroquine (PLAQUENIL) tablet 200 mg, 200 mg, Oral, BID, Cathi Taylor PA    Magnesium Cardiology Dose Replacement - Follow Nurse / BPA Driven Protocol, , Does not apply, PRN, Cathi Taylor PA    nitroglycerin (NITROSTAT) SL tablet 0.4 mg, 0.4 mg, Sublingual, Q5 Min PRN, Cathi Taylor PA    pantoprazole (PROTONIX) EC tablet 40 mg, 40 mg, Oral, Q AM, aCthi Taylor PA    Pharmacy Consult - Pharmacy to dose, , Does not apply, Continuous PRN, Cathi Taylor PA    Phosphorus Replacement - Follow Nurse / BPA Driven Protocol, , Does not apply, PRN, Cathi Taylor PA    Potassium Replacement - Follow Nurse / BPA Driven Protocol, , Does not apply, PRN, Brandon, Cathi M, PA    rosuvastatin (CRESTOR) tablet 20 mg, 20 mg, Oral, Nightly, Cathi Taylor, PA    sodium chloride 0.9 % flush 1-10 mL, 1-10 mL, Intravenous, PRN, Cathi Taylor PA    sodium chloride 0.9 % flush 10 mL, 10 mL,  Intravenous, Q12H, Cathi Taylor PA, 10 mL at 11/28/23 1043    sodium chloride 0.9 % infusion 40 mL, 40 mL, Intravenous, PRN, Cathi Taylor PA    sodium chloride 0.9 % infusion, 1 mL/kg/hr (Order-Specific), Intravenous, Continuous, Cathi Taylor PA, Last Rate: 64 mL/hr at 11/28/23 1043, 1 mL/kg/hr at 11/28/23 1043    Social History     Socioeconomic History    Marital status:    Tobacco Use    Smoking status: Never    Smokeless tobacco: Never   Vaping Use    Vaping Use: Never used   Substance and Sexual Activity    Alcohol use: No    Drug use: No    Sexual activity: Defer       Family History   Problem Relation Age of Onset    Arthritis Mother     Heart attack Mother     Diabetes Father     Heart attack Father     Heart failure Sister     Heart disease Brother     Heart failure Brother     Heart failure Sister     Breast cancer Neg Hx     Ovarian cancer Neg Hx        REVIEW OF SYSTEMS:   CONSTITUTIONAL:         No weight loss, fever, chills,+  weakness + fatigue.   HEENT:                            No visual loss, blurred vision, double vision, yellow sclerae.                                             No hearing loss, congestion, sore throat.   SKIN:                                No rashes, urticaria, ulcers, sores.     RESPIRATORY:               + shortness of breath, - hemoptysis, cough, sputum.   GI:                                     No anorexia, nausea, vomiting, diarrhea. No abdominal pain, melena.   :                                   No burning on urination, hematuria or increased frequency.  ENDOCRINE:                   No diaphoresis, cold or heat intolerance. No polyuria or polydipsia.   NEURO:                            No headache, +dizziness, - syncope, paralysis, ataxia, or parasthesias.                                            No change in bowel or bladder control. No history of CVA/TIA  MUSCULOSKELETAL:    No muscle, back pain, joint pain or stiffness.   HEMATOLOGY:            "    No anemia, bleeding, bruising. No history of DVT/PE.  PSYCH:                            No history of depression, anxiety    Vitals:    11/28/23 1008 11/28/23 1010   BP: 161/62 (!) 181/68   BP Location: Right arm Left arm   Patient Position: Lying Lying   Pulse: 54 61   Resp: 16    Temp: 97.5 °F (36.4 °C)    TempSrc: Temporal    SpO2: 96%    Weight: 64.1 kg (141 lb 6.4 oz)    Height: 167.6 cm (66\")          Vital Sign Min/Max for last 24 hours  Temp  Min: 97.5 °F (36.4 °C)  Max: 97.5 °F (36.4 °C)   BP  Min: 161/62  Max: 181/68   Pulse  Min: 54  Max: 61   Resp  Min: 16  Max: 16   SpO2  Min: 96 %  Max: 96 %   No data recorded    No intake or output data in the 24 hours ending 11/28/23 1140          Physical Exam:  GEN: Well nourished, Well- developed  No acute distress  HEENT: Normocephalic, Atraumatic, PERRLA, moist mucous membranes  NECK: supple, NO JVD, no thyromegaly, no lymphadenopathy  CARDIAC: S1S2  RRR, bradycardic no murmur, gallop, rub  LUNGS: Clear to ausculation, normal respiratory effort  ABDOMEN: Soft, nontender, normal bowel sounds  EXTREMITIES:No gross deformities,  No clubbing, cyanosis, or edema  SKIN: Warm, dry  NEURO: No focal deficits  PSYCHIATRIC: Normal affect and mood      I personally viewed and interpreted the patient's EKG/Telemetry/lab data    Data:   Results from last 7 days   Lab Units 11/28/23  1028   WBC 10*3/mm3 5.61   HEMOGLOBIN g/dL 8.2*   HEMATOCRIT % 27.1*   PLATELETS 10*3/mm3 164     Component  Ref Range & Units 8 d ago  (11/20/23) 3 mo ago  (8/15/23) 3 mo ago  (8/8/23) 1 yr ago  (6/13/22) 1 yr ago  (6/9/22) 1 yr ago  (6/6/22) 1 yr ago  (6/2/22)   WBC  3.40 - 10.80 10*3/mm3 6.35 5.82 6.82 4.31 4.51 6.34 4.58   RBC  3.77 - 5.28 10*6/mm3 2.74 Low  3.69 Low  4.21 2.99 Low  2.88 Low  2.90 Low  3.05 Low    Hemoglobin  12.0 - 15.9 g/dL 8.6 Low  11.1 Low  12.8 8.6 Low  8.2 Low  8.5 Low  8.8 Low    Hematocrit  34.0 - 46.6 % 28.5 Low  36.0 40.4 27.1 Low  25.8 Low  27.1 Low  27.9 Low  "   MCV  79.0 - 97.0 fL 104.0 High  97.6 High  96.0 90.6 89.6 93.4 91.5   MCH  26.6 - 33.0 pg 31.4 30.1 30.4 28.8 28.5 29.3 28.9   MCHC  31.5 - 35.7 g/dL 30.2 Low  30.8 Low  31.7 31.7 31.8 31.4 Low  31.5   RDW  12.3 - 15.4 % 13.7 13.4 13.3 13.0 13.0 13.9 13.7   RDW-SD  37.0 - 54.0 fl 52.1 48.1 47.0 42.4 41.8 47.0 46.3   MPV  6.0 - 12.0 fL 10.5 10.7 10.3 9.8 10.2 9.5 10.1   Platelets  140 - 450 10*3/mm3 152 124 Low  149 209 177 156 141   Resulting Agency  [facility]       Results from last 7 days   Lab Units 11/28/23  1028   SODIUM mmol/L 141   POTASSIUM mmol/L 4.3   CHLORIDE mmol/L 105   CO2 mmol/L 29.0   BUN mg/dL 19   CREATININE mg/dL 1.03*   GLUCOSE mg/dL 85                  Results from last 7 days   Lab Units 11/28/23  1028   APTT seconds 33.8                 No intake or output data in the 24 hours ending 11/28/23 1140          Telemetry: SB   EKG: SB 57 bpm. QTc is 440 ms.         Assessment and Plan:   Paroxysmal Atrial Fibrillation/Tachy-Jose Syndrome:  - patient with documented atrial fibrillation/LA flutter with rapid ventricular rates, symptomatic in nature, with also bradycardia since treatment with Coreg and Amiodarone. Due to tachy-jose syndrome, would recommend pacemaker implant and initiation of Tikosyn afterwards. She  has been off Amioarone x 6 weeks. The risks, benefits, and alternatives of the procedure have been reviewed and the patient wishes to proceed.   - CHADSvasc = 7 on Eliquis 5 mg BID. Held Elqiuis x 1 day.   - Will monitor QTc on serial EKGs and dose Tikosyn accordingly.      2. HTN:  - not well controlled on Coreg (low dose) and Norvasc 5 mg daily. Will increase Norvasc to 5 mg BID.     3. Chronic Anemia:  - patient states she has hemorrhoids.   - may need to consider Watchman Device. H&H is low but stable.       Electronically signed by ARACELI Yao, 11/28/23, 10:29 AM EST.      ImOar MD,  personally performed the services described in this documentation as scribed by the above named individual in my presence, and it is both accurate and complete.  11/28/2023  13:40 EST

## 2023-11-29 ENCOUNTER — APPOINTMENT (OUTPATIENT)
Dept: GENERAL RADIOLOGY | Facility: HOSPITAL | Age: 83
End: 2023-11-29
Payer: MEDICARE

## 2023-11-29 LAB
ABO GROUP BLD: NORMAL
ANION GAP SERPL CALCULATED.3IONS-SCNC: 5 MMOL/L (ref 5–15)
BASOPHILS # BLD AUTO: 0.03 10*3/MM3 (ref 0–0.2)
BASOPHILS NFR BLD AUTO: 0.5 % (ref 0–1.5)
BLD GP AB SCN SERPL QL: NEGATIVE
BUN SERPL-MCNC: 15 MG/DL (ref 8–23)
BUN/CREAT SERPL: 17.4 (ref 7–25)
CALCIUM SPEC-SCNC: 8.7 MG/DL (ref 8.6–10.5)
CHLORIDE SERPL-SCNC: 104 MMOL/L (ref 98–107)
CO2 SERPL-SCNC: 27 MMOL/L (ref 22–29)
CREAT SERPL-MCNC: 0.86 MG/DL (ref 0.57–1)
CRP SERPL-MCNC: 0.76 MG/DL (ref 0–0.5)
DEPRECATED RDW RBC AUTO: 51.1 FL (ref 37–54)
EGFRCR SERPLBLD CKD-EPI 2021: 67.5 ML/MIN/1.73
EOSINOPHIL # BLD AUTO: 0.21 10*3/MM3 (ref 0–0.4)
EOSINOPHIL NFR BLD AUTO: 3.4 % (ref 0.3–6.2)
ERYTHROCYTE [DISTWIDTH] IN BLOOD BY AUTOMATED COUNT: 13.6 % (ref 12.3–15.4)
ERYTHROCYTE [SEDIMENTATION RATE] IN BLOOD: 16 MM/HR (ref 0–30)
GLUCOSE SERPL-MCNC: 89 MG/DL (ref 65–99)
HCT VFR BLD AUTO: 25.7 % (ref 34–46.6)
HGB BLD-MCNC: 7.9 G/DL (ref 12–15.9)
IMM GRANULOCYTES # BLD AUTO: 0.01 10*3/MM3 (ref 0–0.05)
IMM GRANULOCYTES NFR BLD AUTO: 0.2 % (ref 0–0.5)
LYMPHOCYTES # BLD AUTO: 0.71 10*3/MM3 (ref 0.7–3.1)
LYMPHOCYTES NFR BLD AUTO: 11.4 % (ref 19.6–45.3)
MAGNESIUM SERPL-MCNC: 2.8 MG/DL (ref 1.6–2.4)
MCH RBC QN AUTO: 31.6 PG (ref 26.6–33)
MCHC RBC AUTO-ENTMCNC: 30.7 G/DL (ref 31.5–35.7)
MCV RBC AUTO: 102.8 FL (ref 79–97)
MONOCYTES # BLD AUTO: 0.74 10*3/MM3 (ref 0.1–0.9)
MONOCYTES NFR BLD AUTO: 11.9 % (ref 5–12)
NEUTROPHILS NFR BLD AUTO: 4.51 10*3/MM3 (ref 1.7–7)
NEUTROPHILS NFR BLD AUTO: 72.6 % (ref 42.7–76)
NRBC BLD AUTO-RTO: 0 /100 WBC (ref 0–0.2)
PLATELET # BLD AUTO: 143 10*3/MM3 (ref 140–450)
PMV BLD AUTO: 10.2 FL (ref 6–12)
POTASSIUM SERPL-SCNC: 4.3 MMOL/L (ref 3.5–5.2)
RBC # BLD AUTO: 2.5 10*6/MM3 (ref 3.77–5.28)
RH BLD: POSITIVE
SODIUM SERPL-SCNC: 136 MMOL/L (ref 136–145)
T&S EXPIRATION DATE: NORMAL
WBC NRBC COR # BLD AUTO: 6.21 10*3/MM3 (ref 3.4–10.8)

## 2023-11-29 PROCEDURE — 93005 ELECTROCARDIOGRAM TRACING: CPT | Performed by: PHYSICIAN ASSISTANT

## 2023-11-29 PROCEDURE — 99024 POSTOP FOLLOW-UP VISIT: CPT | Performed by: INTERNAL MEDICINE

## 2023-11-29 PROCEDURE — 85025 COMPLETE CBC W/AUTO DIFF WBC: CPT | Performed by: INTERNAL MEDICINE

## 2023-11-29 PROCEDURE — 63710000001 CETIRIZINE 10 MG TABLET: Performed by: PHYSICIAN ASSISTANT

## 2023-11-29 PROCEDURE — 63710000001 PANTOPRAZOLE 40 MG TABLET DELAYED-RELEASE: Performed by: PHYSICIAN ASSISTANT

## 2023-11-29 PROCEDURE — A9270 NON-COVERED ITEM OR SERVICE: HCPCS | Performed by: PHYSICIAN ASSISTANT

## 2023-11-29 PROCEDURE — 63710000001 CARVEDILOL 12.5 MG TABLET: Performed by: PHYSICIAN ASSISTANT

## 2023-11-29 PROCEDURE — A9270 NON-COVERED ITEM OR SERVICE: HCPCS | Performed by: INTERNAL MEDICINE

## 2023-11-29 PROCEDURE — 63710000001 FERROUS SULFATE 325 (65 FE) MG TABLET: Performed by: PHYSICIAN ASSISTANT

## 2023-11-29 PROCEDURE — 63710000001 POLYETHYLENE GLYCOL 17 G PACK: Performed by: INTERNAL MEDICINE

## 2023-11-29 PROCEDURE — 63710000001 ACETAMINOPHEN 325 MG TABLET: Performed by: INTERNAL MEDICINE

## 2023-11-29 PROCEDURE — 63710000001 AMLODIPINE 5 MG TABLET: Performed by: PHYSICIAN ASSISTANT

## 2023-11-29 PROCEDURE — 85652 RBC SED RATE AUTOMATED: CPT | Performed by: INTERNAL MEDICINE

## 2023-11-29 PROCEDURE — 63710000001 HYDROXYCHLOROQUINE 200 MG TABLET: Performed by: PHYSICIAN ASSISTANT

## 2023-11-29 PROCEDURE — 99232 SBSQ HOSP IP/OBS MODERATE 35: CPT | Performed by: INTERNAL MEDICINE

## 2023-11-29 PROCEDURE — 25010000002 NA FERRIC GLUC CPLX PER 12.5 MG: Performed by: INTERNAL MEDICINE

## 2023-11-29 PROCEDURE — A9270 NON-COVERED ITEM OR SERVICE: HCPCS

## 2023-11-29 PROCEDURE — 80048 BASIC METABOLIC PNL TOTAL CA: CPT

## 2023-11-29 PROCEDURE — 63710000001 DOFETILIDE 250 MCG CAPSULE

## 2023-11-29 PROCEDURE — 71046 X-RAY EXAM CHEST 2 VIEWS: CPT

## 2023-11-29 PROCEDURE — 86140 C-REACTIVE PROTEIN: CPT | Performed by: INTERNAL MEDICINE

## 2023-11-29 PROCEDURE — 86901 BLOOD TYPING SEROLOGIC RH(D): CPT | Performed by: INTERNAL MEDICINE

## 2023-11-29 PROCEDURE — 83735 ASSAY OF MAGNESIUM: CPT | Performed by: INTERNAL MEDICINE

## 2023-11-29 PROCEDURE — 63710000001 HYDROCORTISONE 25 MG SUPPOSITORY: Performed by: INTERNAL MEDICINE

## 2023-11-29 PROCEDURE — 86900 BLOOD TYPING SEROLOGIC ABO: CPT | Performed by: INTERNAL MEDICINE

## 2023-11-29 PROCEDURE — 93010 ELECTROCARDIOGRAM REPORT: CPT | Performed by: INTERNAL MEDICINE

## 2023-11-29 PROCEDURE — 86850 RBC ANTIBODY SCREEN: CPT | Performed by: INTERNAL MEDICINE

## 2023-11-29 RX ADMIN — CARVEDILOL 25 MG: 12.5 TABLET, FILM COATED ORAL at 10:09

## 2023-11-29 RX ADMIN — HYDROXYCHLOROQUINE SULFATE 200 MG: 200 TABLET ORAL at 19:28

## 2023-11-29 RX ADMIN — DOFETILIDE 250 MCG: 0.25 CAPSULE ORAL at 01:28

## 2023-11-29 RX ADMIN — HYDROCORTISONE ACETATE 25 MG: 25 SUPPOSITORY RECTAL at 21:08

## 2023-11-29 RX ADMIN — AMLODIPINE BESYLATE 5 MG: 5 TABLET ORAL at 10:10

## 2023-11-29 RX ADMIN — FERROUS SULFATE TAB 325 MG (65 MG ELEMENTAL FE) 325 MG: 325 (65 FE) TAB at 10:09

## 2023-11-29 RX ADMIN — HYDROCORTISONE ACETATE 25 MG: 25 SUPPOSITORY RECTAL at 10:11

## 2023-11-29 RX ADMIN — SODIUM CHLORIDE 125 MG: 9 INJECTION, SOLUTION INTRAVENOUS at 10:10

## 2023-11-29 RX ADMIN — AMLODIPINE BESYLATE 5 MG: 5 TABLET ORAL at 19:27

## 2023-11-29 RX ADMIN — DOFETILIDE 250 MCG: 0.25 CAPSULE ORAL at 21:06

## 2023-11-29 RX ADMIN — ROSUVASTATIN 20 MG: 20 TABLET, FILM COATED ORAL at 19:28

## 2023-11-29 RX ADMIN — DOFETILIDE 250 MCG: 0.25 CAPSULE ORAL at 10:10

## 2023-11-29 RX ADMIN — HYDROXYCHLOROQUINE SULFATE 200 MG: 200 TABLET ORAL at 10:10

## 2023-11-29 RX ADMIN — ACETAMINOPHEN 650 MG: 325 TABLET ORAL at 04:29

## 2023-11-29 RX ADMIN — Medication 3 ML: at 10:11

## 2023-11-29 RX ADMIN — CETIRIZINE HYDROCHLORIDE 10 MG: 10 TABLET, FILM COATED ORAL at 10:09

## 2023-11-29 RX ADMIN — ACETAMINOPHEN 650 MG: 325 TABLET ORAL at 10:47

## 2023-11-29 RX ADMIN — ESCITALOPRAM OXALATE 20 MG: 20 TABLET ORAL at 19:28

## 2023-11-29 RX ADMIN — CARVEDILOL 25 MG: 12.5 TABLET, FILM COATED ORAL at 17:37

## 2023-11-29 RX ADMIN — POLYETHYLENE GLYCOL 3350 17 G: 17 POWDER, FOR SOLUTION ORAL at 10:10

## 2023-11-29 RX ADMIN — Medication 3 ML: at 19:28

## 2023-11-29 RX ADMIN — PANTOPRAZOLE SODIUM 40 MG: 40 TABLET, DELAYED RELEASE ORAL at 05:47

## 2023-11-29 RX ADMIN — ACETAMINOPHEN 650 MG: 325 TABLET ORAL at 19:27

## 2023-11-29 NOTE — PROGRESS NOTES
"Pharmacy Consult - Tikosyn Initiation    Kelsey Avendano is a 83 y.o. female admitted for Tikosyn initiation and monitoring.    Height: 167.6 cm (66\")  Weight: 64.1 kg (141 lb 6.4 oz)    Evaluation of Drug-Drug Interactions     Previous antiarrhythmic medications must be discontinued prior to admission       - Amiodarone must be discontinued >3 weeks prior to Tikosyn start       - Sotalol and class I antiarrhythmics (Dysopyramide, Flecainide, Mexiletine, Propafenone) must be discontinued >48 hours prior to Tikosyn start         - Previous antiarrhythmic therapy: Stopped amiodarone in October 2023     Current drug-drug interactions noted with admission medications and Tikosyn    The following medications are contraindicated with Dofetilide and will be/have been discontinued:  - Fluoroquinolones (Ciprofloxacin, Levofloxacin, Moxifloxacin, Norfloxacin)  - Azole antifungals (Itraconazole, Ketoconazole, Posaconazole)  - Hydrochlorothiazide and any combination products containing Hydrochlorothiazide   - Trimethoprim and Trimethoprim-Sulfamethoxazole   - Verapamil  - Cimetidine  - Ziprasidone   - Dolutegravir  - Sauinavir  - Thioridazine   - Fingolimod  - Megestrol  - Pimozide  - Prochloperazine  - Lamotrigine  - Ibutilide  - Procainamide  - Macrolide antibiotics (Erythromycin, Clarithromycin)  - Quetiapine  - Citalopram    The following medications are recognized to prolong QT interval, however the medication may continue during initial Dofetilide dosing:  - Loop diuretics (Bumetanide, Furosemide, Torsemide)  - Antipsychotics (Haloperidol,  Risperidone, Asenapine)   - Antidepressants (Amitriptyline, Amoxapine, Clomipramine, Desipramine, Doxepin, Imipramine, Maprotiline, Mirtazapine, Nortriptyline, Protriptyline, Triipramine)  - Diltiazem  - Ondansetron  - Ivabradine  - Eribulin  - Paliperidone  - Phenothiazines (Chlorpromazine, Fluphenazine, Mesoridazine, Perphenazine, Promazine, Trifluoperazine)    The following " medications may increase Dofetilide serum concentrations and can be co-administered:  - Azole antifungals (Fluconazole, Voriconazole)  - SSRI's ( Escitalopram, Fluvoxamine, Fluoxetine, Paroxetine, Sertraline)  - Protease Inhibitors (Amprenavir, Indinavir, Nelfinavir, Ritonavir)  - Diltiazem   - Metformin, Glyburide  - Amiloride  - Cannabinoids  - Nefazodone  - Triamterene  - Quinine  - Hydroxychloroquine    Laboratory    Results from last 7 days   Lab Units 11/28/23  1028   SODIUM mmol/L 141   POTASSIUM mmol/L 4.3   CHLORIDE mmol/L 105   CO2 mmol/L 29.0   BUN mg/dL 19   CREATININE mg/dL 1.03*   GLUCOSE mg/dL 85   CALCIUM mg/dL 8.7     Results from last 7 days   Lab Units 11/28/23  1028   MAGNESIUM mg/dL 1.9       Pharmacy will order electrolyte replacement per protocols if indicated (Mag < 2.0, K < 4.0) based on laboratory values on admission      - Magnesium replacement protocol ordered: Yes     - Potassium replacement protocol ordered: Yes    Estimated CrCl =  42.6 mL/min  (Calculated with Cockroft-Gault equation using actual body weight and serum creatinine for calculation--can use laboratory values from previous 24 hours)    Initial QTc and EKG Monitoring    QTc = 476     If QTc is:     < 440 msec: okay to proceed with initiation      > 440 msec: must contact MD or physician extender (APRN/PA-C)             - Provider contacted: Yes             - Okay to proceed:  Yes, provider calculated Qtc of 440.                    If QTc  < 500 msec and a pre-existing ventricular conduction defect exists), must contact MD or physician extender (APRN/PA-C)            - Provider contacted: n/a             - Okay to proceed: No    Patient has a functioning atrial pacemaker - Yes     Cardiology aware, will proceed    Initial Tikosyn dose based on Creatinine Clearance:    CrCl > 60 mL/min - Dofetilide 500 mcg PO Q12H  CrCl 40-60 mL/min - Dofetilide 250 mcg PO Q12H  CrCl 20-39 mL/min - Dofetilide 125 mcg PO Q12H  CrCl < 20 mL/min  - Do not start medication, contact MD    (Will dose medication 10 hour intervals until administration times are between 7 and 9).    Assessment/Plan:    Patient admitted for Tikosyn initiation per cardiology. Will initiate Tikosyn 250 mcg PO every 12 hours.  Monitor electrolytes and drug-drug interactions.  Pharmacy will continue to follow.

## 2023-11-29 NOTE — PROGRESS NOTES
[facility] Medicine Services  CLINICAL REVIEW NOTE    Patient Name: Kelsey Avendano  : 1940  MRN: [id number]    Date of Admission: 2023  Length of Stay: 0  Attending Service:  Cardiology    Following for anemia evaluation. Consult from Dr. Martinez reviewed, briefly saw patient at bedside. Agree with assessment from yesterday. TSAT is 6.4%, ordered IV iron x1. We will be available to help with DC needs as well in this patient anticipated to DC home tomorrow.     Anemia  Hemorrhoids  -Hx hemorrhoids and suspect the same contributing to chronic blood loss  -anemia of chronic disease also likely contributing  -TSAT 6.4%, ordered IV iron x1  -currently taking oral iron BID, can take daily or EOD with equivocal absorption and may reduce constipation adding to hemorrhoids  -annusol supp x7 days, daily miralax  -f/u w/ Dr. Myles post DC (~2 weeks)

## 2023-11-29 NOTE — PROGRESS NOTES
[facility] at [facility]  Progress Note     LOS: 0 days   Patient Care Team:  Sudhir Arita MD as PCP - General (Internal Medicine)    Chief Complaint:  Tachy-Jose Syndrome    Subjective     Patient feeling well this morning.  She denies chest pain or shortness of breath.  She denies side effects to Tikosyn.  Telemetry shows a paced rhythm/normal sinus rhythm.  Overall feels better today.        Review of Systems:   Pertinent positives in HPI, all others reviewed and negative.      Current Facility-Administered Medications:     acetaminophen (TYLENOL) tablet 650 mg, 650 mg, Oral, Q4H PRN, 650 mg at 11/29/23 0429 **OR** acetaminophen (TYLENOL) suppository 650 mg, 650 mg, Rectal, Q4H PRN, Omar Gonzalez MD    amLODIPine (NORVASC) tablet 5 mg, 5 mg, Oral, BID, Cathi Taylor PA, 5 mg at 11/28/23 2026    Calcium Replacement - Follow Nurse / BPA Driven Protocol, , Does not apply, PRN, Cathi Taylor PA    carvedilol (COREG) tablet 25 mg, 25 mg, Oral, BID With Meals, Cathi Taylor PA, 25 mg at 11/28/23 1450    cetirizine (zyrTEC) tablet 10 mg, 10 mg, Oral, Daily, Cathi Taylor PA, 10 mg at 11/28/23 1540    [COMPLETED] dofetilide (TIKOSYN) capsule 250 mcg, 250 mcg, Oral, Once, 250 mcg at 11/29/23 0128 **FOLLOWED BY** dofetilide (TIKOSYN) capsule 250 mcg, 250 mcg, Oral, Once **FOLLOWED BY** dofetilide (TIKOSYN) capsule 250 mcg, 250 mcg, Oral, Q12H, Elver Villar [facility]    escitalopram (LEXAPRO) tablet 20 mg, 20 mg, Oral, Nightly, Cathi Taylor PA, 20 mg at 11/28/23 2026    ferric gluconate (FERRLECIT)125 MG in sodium chloride 0.9 % 100 mL IVPB, 125 mg, Intravenous, Once, Alexandr Wynn DO    ferrous sulfate tablet 325 mg, 325 mg, Oral, Daily With Breakfast, Cathi Taylor PA    furosemide (LASIX) tablet 10 mg, 10 mg, Oral, Nightly PRN, Cathi Taylor PA    hydrocortisone (ANUSOL-HC) suppository 25 mg, 25 mg, Rectal, BID, Femi Martinez MD    hydroxychloroquine  "(PLAQUENIL) tablet 200 mg, 200 mg, Oral, BID, Cathi Taylor PA, 200 mg at 11/28/23 2026    Magnesium Cardiology Dose Replacement - Follow Nurse / BPA Driven Protocol, , Does not apply, PRN, Cathi Taylor PA    nitroglycerin (NITROSTAT) SL tablet 0.4 mg, 0.4 mg, Sublingual, Q5 Min PRN, Cathi Taylor PA    ondansetron (ZOFRAN) injection 4 mg, 4 mg, Intravenous, Q6H PRN, Omar Gonzalez MD    pantoprazole (PROTONIX) EC tablet 40 mg, 40 mg, Oral, Q AM, Cathi Taylor PA, 40 mg at 11/29/23 0547    Pharmacy Consult - Pharmacy to dose, , Does not apply, Continuous PRN, Cathi Taylor PA    Phosphorus Replacement - Follow Nurse / BPA Driven Protocol, , Does not apply, PRN, Cathi Taylor PA    polyethylene glycol (MIRALAX) packet 17 g, 17 g, Oral, Daily, Femi Martinez MD, 17 g at 11/28/23 1822    Potassium Replacement - Follow Nurse / BPA Driven Protocol, , Does not apply, PRN, Cathi Taylor PA    rosuvastatin (CRESTOR) tablet 20 mg, 20 mg, Oral, Nightly, Cathi Taylor PA, 20 mg at 11/28/23 2026    sodium chloride 0.9 % flush 10 mL, 10 mL, Intravenous, PRN, Omar Gonzalez MD    sodium chloride 0.9 % flush 3 mL, 3 mL, Intravenous, Q12H, Omar Gonzalez MD, 3 mL at 11/28/23 2028    sodium chloride 0.9 % infusion 40 mL, 40 mL, Intravenous, PRN, Omar Gonzalez MD      Objective     Vital Sign Min/Max for last 24 hours  Temp  Min: 97.5 °F (36.4 °C)  Max: 98.5 °F (36.9 °C)   BP  Min: 127/53  Max: 185/72   Pulse  Min: 54  Max: 74   Resp  Min: 11  Max: 18   SpO2  Min: 91 %  Max: 100 %   Flow (L/min)  Min: 2  Max: 2   Weight  Min: 64.1 kg (141 lb 6.4 oz)  Max: 64.1 kg (141 lb 6.4 oz)     Flowsheet Rows      Flowsheet Row First Filed Value   Admission Height 167.6 cm (66\") Documented at 11/28/2023 1008   Admission Weight 64.1 kg (141 lb 6.4 oz) Documented at 11/28/2023 1008            Physical Exam:     General Appearance:    Alert, cooperative, in no acute distress   Lungs:   Clear to " auscultation bilaterally.  Respaire effort normal.    Heart:  Regular rate rhythm normal S1-S2.  There is an S4.   Chest Wall:    No abnormalities observed   Abdomen:     Normal bowel sounds, no masses,  soft nontender, nondistended,    Extremities:   No gross deformities, no edema, no cyanosis,    Pulses:   Pulses palpable and equal bilaterally   Skin:   Implant site clean dry intact without signs of hematoma or infection.         Results Review:   Results from last 7 days   Lab Units 11/29/23  0506 11/28/23  1028   WBC 10*3/mm3 6.21 5.61   HEMOGLOBIN g/dL 7.9* 8.2*   HEMATOCRIT % 25.7* 27.1*   PLATELETS 10*3/mm3 143 164     Results from last 7 days   Lab Units 11/28/23  1028   SODIUM mmol/L 141   POTASSIUM mmol/L 4.3   CHLORIDE mmol/L 105   CO2 mmol/L 29.0   BUN mg/dL 19   CREATININE mg/dL 1.03*   GLUCOSE mg/dL 85                      Results from last 7 days   Lab Units 11/28/23  1028   APTT seconds 33.8           Intake/Output Summary (Last 24 hours) at 11/29/2023 0839  Last data filed at 11/28/2023 1343  Gross per 24 hour   Intake 250 ml   Output --   Net 250 ml       I personally viewed and interpreted the patient's EKG/Telemetry data    Chest X-ray: pending.      Telemetry: NSR/A paced 84 to 74 bpm        Present on Admission:  **None**    Assessment & Plan   Tachybradycardia syndrome: s/p dual-chamber permanent pacemaker implant ([employer]) pt has done well overnight. The chest Xray currently pending and chest incision site are unremarkable. Wound care and post device care have been reviewed with the patient in detail. Pt will have a wound check in 7-10 days then a follow up with  in 3 months.   Paroxysmal atrial fibrillation: Initiation of Tikosyn 11/28/2023.  Patient remains in normal sinus rhythm/a paced rhythm with QTc approximately 450 ms this morning.  Will continue at same dose of 250 mcg twice daily.  Monitor serial EKGs.  Chronic anemia: Appreciate hospitalist assistance.  Likely  from chronic hemorrhoids and anemia of chronic disease.  In progress with potential administration of IV iron.  Will hold Eliquis this morning and plan to start back tomorrow.      Plan for disposition: EKG in AM.  Anticipate discharge home tomorrow.      Electronically signed by ARACELI Yao, 11/29/23, 8:07 AM Omar GIBBS MD, personally performed the services face to face as described and documented by the above named individual. I have made any necessary edits and it is both accurate and complete 11/29/2023  16:46 EST

## 2023-11-29 NOTE — CASE MANAGEMENT/SOCIAL WORK
"Discharge Planning Assessment  [facility]     Patient Name: Kelsey Avendano  MRN: [id number]  Today's Date: 11/29/2023    Admit Date: 11/28/2023    Plan: discharge plan   Discharge Needs Assessment       Row Name 11/29/23 1212       Living Environment    People in Home alone    Primary Care Provided by self    Provides Primary Care For no one    Family Caregiver if Needed child(lisandro), adult    Family Caregiver Names Gerardo and Nito Avendano(adult sons)    Quality of Family Relationships helpful;supportive    Able to Return to Prior Arrangements yes    Living Arrangement Comments I met with pt at bedside with permission regarding discharge plan. Pt reports she resides in [facility] in a home alone       Transition Planning    Patient/Family Anticipated Services at Transition     Transportation Anticipated car, drives self;family or friend will provide       Discharge Needs Assessment    Readmission Within the Last 30 Days no previous admission in last 30 days    Equipment Currently Used at Home nebulizer    Concerns to be Addressed discharge planning    Equipment Needed After Discharge nebulizer    Discharge Coordination/Progress Pt confirms that she has [employer] Medicare Replacement insurance with prescription coverage and uses [facility] in [place]. Pt reports she is independent with ADLs and uses no DME for mobility. Pt still drives. Pt is here with tachacardia-bradycardia syndrome and for Tiksosyn initiation. Pt is agreeable to \"meds to bed\" with [facility] in the event her new meds will need a prior auth. Plan is home at discharge and states Gerardo(son) will transport her and is available to stay with her if needed. CM will cont to follow                   Discharge Plan       Row Name 11/29/23 1220       Plan    Plan discharge plan    Plan Comments Pt is here with tachacardia-bradycardia syndrome and for Tiksosyn initiation. Pt is agreeable to \"meds to bed\" with [facility] in the event her new meds will need a prior auth. Plan is home at discharge and states Gerardo(son) will transport her and is available to stay with her if needed. CM will cont to follow    Final Discharge Disposition Code 01 - home or self-care                  Continued Care and Services - Admitted Since 11/28/2023    Coordination has not been started for this encounter.       Expected Discharge Date and Time       Expected Discharge Date Expected Discharge Time    Nov 30, 2023            Demographic Summary       Row Name 11/29/23 1211       General Information    General Information Comments PCP is ROSENDA VERNON       Contact Information    Permission Granted to Share Info With     Contact Information Obtained for     Contact Information Comments Gerardo Avendano(son) [phone]                   Functional Status       Row Name 11/29/23 1211       Functional Status    Functional Status Comments Pt reports she is independent with ADLs                   Psychosocial    No documentation.                  Abuse/Neglect    No documentation.                  Legal    No documentation.                  Substance Abuse    No documentation.                  Patient Forms    No documentation.                     Kerri Carrero RN

## 2023-11-30 ENCOUNTER — READMISSION MANAGEMENT (OUTPATIENT)
Dept: CALL CENTER | Facility: HOSPITAL | Age: 83
End: 2023-11-30
Payer: MEDICARE

## 2023-11-30 VITALS
OXYGEN SATURATION: 90 % | HEART RATE: 70 BPM | HEIGHT: 66 IN | RESPIRATION RATE: 18 BRPM | DIASTOLIC BLOOD PRESSURE: 54 MMHG | WEIGHT: 141.4 LBS | BODY MASS INDEX: 22.73 KG/M2 | SYSTOLIC BLOOD PRESSURE: 128 MMHG | TEMPERATURE: 98.5 F

## 2023-11-30 LAB
DEPRECATED RDW RBC AUTO: 50.1 FL (ref 37–54)
ERYTHROCYTE [DISTWIDTH] IN BLOOD BY AUTOMATED COUNT: 13.3 % (ref 12.3–15.4)
HCT VFR BLD AUTO: 28.1 % (ref 34–46.6)
HGB BLD-MCNC: 8.3 G/DL (ref 12–15.9)
MCH RBC QN AUTO: 30 PG (ref 26.6–33)
MCHC RBC AUTO-ENTMCNC: 29.5 G/DL (ref 31.5–35.7)
MCV RBC AUTO: 101.4 FL (ref 79–97)
PLATELET # BLD AUTO: 152 10*3/MM3 (ref 140–450)
PMV BLD AUTO: 9.9 FL (ref 6–12)
RBC # BLD AUTO: 2.77 10*6/MM3 (ref 3.77–5.28)
WBC NRBC COR # BLD AUTO: 4.96 10*3/MM3 (ref 3.4–10.8)

## 2023-11-30 PROCEDURE — 93010 ELECTROCARDIOGRAM REPORT: CPT | Performed by: INTERNAL MEDICINE

## 2023-11-30 PROCEDURE — 85027 COMPLETE CBC AUTOMATED: CPT

## 2023-11-30 PROCEDURE — 93005 ELECTROCARDIOGRAM TRACING: CPT | Performed by: PHYSICIAN ASSISTANT

## 2023-11-30 PROCEDURE — 99024 POSTOP FOLLOW-UP VISIT: CPT | Performed by: INTERNAL MEDICINE

## 2023-11-30 RX ORDER — DOFETILIDE 0.12 MG/1
125 CAPSULE ORAL EVERY 12 HOURS SCHEDULED
Qty: 60 CAPSULE | Refills: 5 | Status: SHIPPED | OUTPATIENT
Start: 2023-11-30

## 2023-11-30 RX ORDER — HYDROCORTISONE ACETATE 25 MG/1
25 SUPPOSITORY RECTAL 2 TIMES DAILY
Qty: 11 SUPPOSITORY | Refills: 0 | Status: SHIPPED | OUTPATIENT
Start: 2023-11-30 | End: 2023-12-06

## 2023-11-30 RX ORDER — FERROUS GLUCONATE 324(37.5)
324 TABLET ORAL DAILY
Start: 2023-11-30

## 2023-11-30 RX ORDER — AMLODIPINE BESYLATE 5 MG/1
5 TABLET ORAL 2 TIMES DAILY
Qty: 60 TABLET | Refills: 5 | Status: SHIPPED | OUTPATIENT
Start: 2023-11-30

## 2023-11-30 RX ORDER — DOFETILIDE 0.25 MG/1
250 CAPSULE ORAL EVERY 12 HOURS SCHEDULED
Qty: 60 CAPSULE | Refills: 5 | Status: SHIPPED | OUTPATIENT
Start: 2023-11-30 | End: 2023-11-30 | Stop reason: HOSPADM

## 2023-11-30 RX ORDER — DOFETILIDE 0.12 MG/1
125 CAPSULE ORAL EVERY 12 HOURS SCHEDULED
Status: DISCONTINUED | OUTPATIENT
Start: 2023-11-30 | End: 2023-11-30 | Stop reason: HOSPADM

## 2023-11-30 RX ORDER — POLYETHYLENE GLYCOL 3350 17 G/17G
17 POWDER, FOR SOLUTION ORAL DAILY
Qty: 238 G | Refills: 0 | Status: SHIPPED | OUTPATIENT
Start: 2023-12-01

## 2023-11-30 RX ADMIN — AMLODIPINE BESYLATE 5 MG: 5 TABLET ORAL at 08:13

## 2023-11-30 RX ADMIN — HYDROCORTISONE ACETATE 25 MG: 25 SUPPOSITORY RECTAL at 08:13

## 2023-11-30 RX ADMIN — POLYETHYLENE GLYCOL 3350 17 G: 17 POWDER, FOR SOLUTION ORAL at 08:12

## 2023-11-30 RX ADMIN — FERROUS SULFATE TAB 325 MG (65 MG ELEMENTAL FE) 325 MG: 325 (65 FE) TAB at 08:13

## 2023-11-30 RX ADMIN — DOFETILIDE 250 MCG: 0.25 CAPSULE ORAL at 08:14

## 2023-11-30 RX ADMIN — PANTOPRAZOLE SODIUM 40 MG: 40 TABLET, DELAYED RELEASE ORAL at 06:11

## 2023-11-30 RX ADMIN — CETIRIZINE HYDROCHLORIDE 10 MG: 10 TABLET, FILM COATED ORAL at 08:13

## 2023-11-30 RX ADMIN — APIXABAN 5 MG: 5 TABLET, FILM COATED ORAL at 08:13

## 2023-11-30 RX ADMIN — CARVEDILOL 25 MG: 12.5 TABLET, FILM COATED ORAL at 08:13

## 2023-11-30 RX ADMIN — HYDROXYCHLOROQUINE SULFATE 200 MG: 200 TABLET ORAL at 08:13

## 2023-11-30 RX ADMIN — ACETAMINOPHEN 650 MG: 325 TABLET ORAL at 02:58

## 2023-11-30 RX ADMIN — Medication 3 ML: at 08:14

## 2023-11-30 NOTE — PROGRESS NOTES
[facility]  CLINICAL REVIEW NOTE    Patient Name: Kelsey Avendano  : 1940  MRN: [id number]    Date of Admission: 2023  Length of Stay: 1  Attending Service:  Cardiology    Chart reviewed, anticipated to discharge this afternoon if EKG stable. H&H stable late this morning. I have adjusted her home oral Iron instructions, sent Rx for miralax and annusol. Follow up appt requested for Dr. Myles. Please let us know if there is anything else we can assist with for this patient.    Anemia  Hemorrhoids  -Hx hemorrhoids and suspect the same contributing to chronic blood loss  -anemia of chronic disease also likely contributing  -TSAT 6.4%, s/p IV iron x1  -currently taking oral iron BID, can take daily with equivocal absorption and may reduce constipation adding to hemorrhoids  -annusol supp x7 days, daily miralax  -f/u w/ Dr. Myles post DC (~2 weeks)

## 2023-11-30 NOTE — PLAN OF CARE
Goal Outcome Evaluation:  Plan of Care Reviewed With: patient, son        Progress: improving  Outcome Evaluation: Patient with discharge orders.  Patients son will transport the patient home.

## 2023-11-30 NOTE — PROGRESS NOTES
[facility] at [facility]  Progress Note     LOS: 1 day   Patient Care Team:  Sudhir Arita MD as PCP - General (Internal Medicine)    Chief Complaint:  Tachy-Jose Syndrome    Subjective   Doing well this morning.  Denies palpitations, chest pain or shortness of breath.  Denies side effects to Tikosyn.  Remains in a paced rhythm/normal sinus rhythm.  Feels overall improved.  No complaints.        Review of Systems:   Pertinent positives in HPI, all others reviewed and negative.      Current Facility-Administered Medications:     acetaminophen (TYLENOL) tablet 650 mg, 650 mg, Oral, Q4H PRN, 650 mg at 11/30/23 0258 **OR** acetaminophen (TYLENOL) suppository 650 mg, 650 mg, Rectal, Q4H PRN, Omar Gonzalez MD    amLODIPine (NORVASC) tablet 5 mg, 5 mg, Oral, BID, Cathi Taylor PA, 5 mg at 11/30/23 0813    apixaban (ELIQUIS) tablet 5 mg, 5 mg, Oral, Q12H, Cathi Taylor PA, 5 mg at 11/30/23 0813    Calcium Replacement - Follow Nurse / BPA Driven Protocol, , Does not apply, PRN, Cathi Taylor PA    carvedilol (COREG) tablet 25 mg, 25 mg, Oral, BID With Meals, Cathi Taylor PA, 25 mg at 11/30/23 0813    cetirizine (zyrTEC) tablet 10 mg, 10 mg, Oral, Daily, Cathi Taylor PA, 10 mg at 11/30/23 0813    [COMPLETED] dofetilide (TIKOSYN) capsule 250 mcg, 250 mcg, Oral, Once, 250 mcg at 11/29/23 0128 **FOLLOWED BY** [COMPLETED] dofetilide (TIKOSYN) capsule 250 mcg, 250 mcg, Oral, Once, 250 mcg at 11/29/23 1010 **FOLLOWED BY** dofetilide (TIKOSYN) capsule 250 mcg, 250 mcg, Oral, Q12H, Elver Villar H, 250 mcg at 11/30/23 0814    escitalopram (LEXAPRO) tablet 20 mg, 20 mg, Oral, Nightly, Cathi Taylor PA, 20 mg at 11/29/23 1928    ferrous sulfate tablet 325 mg, 325 mg, Oral, Daily With Breakfast, Cathi Taylor PA, 325 mg at 11/30/23 0813    furosemide (LASIX) tablet 10 mg, 10 mg, Oral, Nightly PRN, Cathi Taylor PA    hydrocortisone (ANUSOL-HC) suppository 25 mg, 25 mg,  "Rectal, BID, Femi Martinez MD, 25 mg at 11/30/23 0813    hydroxychloroquine (PLAQUENIL) tablet 200 mg, 200 mg, Oral, BID, Cathi Taylor PA, 200 mg at 11/30/23 0813    Magnesium Cardiology Dose Replacement - Follow Nurse / BPA Driven Protocol, , Does not apply, PRN, Cathi Taylor PA    nitroglycerin (NITROSTAT) SL tablet 0.4 mg, 0.4 mg, Sublingual, Q5 Min PRN, Cathi Taylor PA    ondansetron (ZOFRAN) injection 4 mg, 4 mg, Intravenous, Q6H PRN, Omar Gonzalez MD    pantoprazole (PROTONIX) EC tablet 40 mg, 40 mg, Oral, Q AM, Cathi Taylor PA, 40 mg at 11/30/23 0611    Pharmacy Consult - Pharmacy to dose, , Does not apply, Continuous PRN, Cathi Taylor PA    Phosphorus Replacement - Follow Nurse / BPA Driven Protocol, , Does not apply, PRN, Cathi Taylor PA    polyethylene glycol (MIRALAX) packet 17 g, 17 g, Oral, Daily, Femi Martinez MD, 17 g at 11/30/23 0812    Potassium Replacement - Follow Nurse / BPA Driven Protocol, , Does not apply, PRN, Cathi Taylor PA    rosuvastatin (CRESTOR) tablet 20 mg, 20 mg, Oral, Nightly, Cathi Taylor PA, 20 mg at 11/29/23 1928    sodium chloride 0.9 % flush 10 mL, 10 mL, Intravenous, PRN, Omar Gonzalez MD    sodium chloride 0.9 % flush 3 mL, 3 mL, Intravenous, Q12H, Omar Gonzalez MD, 3 mL at 11/30/23 0814    sodium chloride 0.9 % infusion 40 mL, 40 mL, Intravenous, PRN, Omar Gonzalez MD      Objective     Vital Sign Min/Max for last 24 hours  Temp  Min: 97.6 °F (36.4 °C)  Max: 99.2 °F (37.3 °C)   BP  Min: 122/70  Max: 140/63   Pulse  Min: 70  Max: 72   Resp  Min: 16  Max: 18   SpO2  Min: 92 %  Max: 98 %   Flow (L/min)  Min: 2  Max: 2   No data recorded     Flowsheet Rows      Flowsheet Row First Filed Value   Admission Height 167.6 cm (66\") Documented at 11/28/2023 1008   Admission Weight 64.1 kg (141 lb 6.4 oz) Documented at 11/28/2023 1008            Physical Exam:     General Appearance:    Alert, cooperative, in no acute distress "   Lungs:    clear to auscultation bilaterally.  Respaire effort normal    Heart:  Regular rate rhythm normal S1-S2 there is an S4   Chest Wall:    No abnormalities observed   Abdomen:     Normal bowel sounds, no masses,  soft nontender, nondistended,    Extremities:   No gross deformities, no edema, no cyanosis,    Pulses:   Pulses palpable and equal bilaterally   Skin:   Implant site clean dry intact without signs of hematoma or infection.         Results Review:   Results from last 7 days   Lab Units 11/29/23  0506 11/28/23  1028   WBC 10*3/mm3 6.21 5.61   HEMOGLOBIN g/dL 7.9* 8.2*   HEMATOCRIT % 25.7* 27.1*   PLATELETS 10*3/mm3 143 164     Results from last 7 days   Lab Units 11/29/23  0506 11/28/23  1028   SODIUM mmol/L 136 141   POTASSIUM mmol/L 4.3 4.3   CHLORIDE mmol/L 104 105   CO2 mmol/L 27.0 29.0   BUN mg/dL 15 19   CREATININE mg/dL 0.86 1.03*   GLUCOSE mg/dL 89 85                      Results from last 7 days   Lab Units 11/28/23  1028   APTT seconds 33.8           Intake/Output Summary (Last 24 hours) at 11/30/2023 0835  Last data filed at 11/29/2023 1033  Gross per 24 hour   Intake 225 ml   Output --   Net 225 ml       I personally viewed and interpreted the patient's EKG/Telemetry data    Chest X-ray: New left chest wall pacemaker with leads in expected position. No evidence of pneumothorax.      Telemetry: NSR/A paced 70-72 bpm        Present on Admission:  **None**    Assessment & Plan   Tachybradycardia syndrome: s/p dual-chamber permanent pacemaker implant ([employer]) pt has done well overnight. The chest Xray acceptable, no pneumothrorax and chest incision site are unremarkable. Wound care and post device care have been reviewed with the patient in detail. Pt will have a wound check in 7-10 days then a follow up with  in 3 months.   Paroxysmal atrial fibrillation: Initiation of Tikosyn 11/28/2023.  Patient remains in normal sinus rhythm/a paced rhythm with QTc approximately 454  ms this morning.  Will decrease Tikosyn to 125 mcg BID. EKG at 3 p.m.   Chronic anemia: Appreciate hospitalist assistance.  Likely from chronic hemorrhoids and anemia of chronic disease.  Discussed with Dr. Wynn plan for follow up with Dr. Myles outpatient for hemorrhoids. Iron and stool softener recommendations. Eliquis held yesterday. H&H stable today, will resume Eliquis.       Plan for disposition: EKG at 3 p.m. If QTC stable, likely home today.    Repeat twelve-lead EKG with QTc stable at 125 mics p.o. twice daily.  Okay for discharge.  Resume Eliquis in a.m. tomorrow.      Electronically signed by DORIS Husian, 11/30/23, 9:20 AM MILI.       I, Omar Gonzalez MD, personally performed the services face to face as described and documented by the above named individual. I have made any necessary edits and it is both accurate and complete 11/30/2023  15:37 EST

## 2023-12-01 ENCOUNTER — HOSPITAL ENCOUNTER (OUTPATIENT)
Dept: RESPIRATORY THERAPY | Facility: HOSPITAL | Age: 83
Discharge: HOME | End: 2023-12-01
Payer: MEDICARE

## 2023-12-01 ENCOUNTER — TELEPHONE (OUTPATIENT)
Dept: CARDIOLOGY | Facility: CLINIC | Age: 83
End: 2023-12-01
Payer: MEDICARE

## 2023-12-01 DIAGNOSIS — I48.92 PAROXYSMAL ATRIAL FLUTTER: ICD-10-CM

## 2023-12-01 PROCEDURE — 93005 ELECTROCARDIOGRAM TRACING: CPT

## 2023-12-01 NOTE — OUTREACH NOTE
Prep Survey      Flowsheet Row Responses   [facility] discharged [facility]? [facility]   Is LACE score < 7 ? No   Eligibility Readm Mgmt   Discharge diagnosis Tachycardia-bradycardia syndrome   Does the patient have one of the following disease processes/diagnoses(primary or secondary)? Other   Does the patient have Home health ordered? No   Is there a DME ordered? No   Prep survey completed? Yes            Mel WARD - Registered Nurse

## 2023-12-02 LAB
QT INTERVAL: 438 MS
QTC INTERVAL: 473 MS

## 2023-12-04 ENCOUNTER — READMISSION MANAGEMENT (OUTPATIENT)
Dept: CALL CENTER | Facility: HOSPITAL | Age: 83
End: 2023-12-04
Payer: MEDICARE

## 2023-12-04 LAB
QT INTERVAL: 430 MS
QT INTERVAL: 436 MS
QT INTERVAL: 454 MS
QT INTERVAL: 458 MS
QT INTERVAL: 490 MS
QTC INTERVAL: 464 MS
QTC INTERVAL: 470 MS
QTC INTERVAL: 476 MS
QTC INTERVAL: 490 MS
QTC INTERVAL: 494 MS

## 2023-12-04 NOTE — OUTREACH NOTE
Medical Week 1 Survey      Flowsheet Row Responses   [facility] patient discharged from? [facility]   Does the patient have one of the following disease processes/diagnoses(primary or secondary)? Other   Week 1 attempt successful? Yes   Call start time 1206   Call end time 1212   Discharge diagnosis Tachycardia-bradycardia syndrome   Medication alerts for this patient Tikosyn--pt has had f/u EKG since discharge   Meds reviewed with patient/caregiver? Yes   Is the patient having any side effects they believe may be caused by any medication additions or changes? No   Does the patient have all medications ordered at discharge? Yes   Is the patient taking all medications as directed (includes completed medication regime)? Yes   Does the patient have a primary care provider?  Yes   Does the patient have an appointment with their PCP within 7 days of discharge? N/A   Has the patient kept scheduled appointments due by today? N/A   Comments PM wound care check on 12/7/23   Has home health visited the patient within 72 hours of discharge? N/A   Psychosocial issues? No   Did the patient receive a copy of their discharge instructions? Yes   Nursing interventions Reviewed instructions with patient   What is the patient's perception of their health status since discharge? Improving  [Pt is doing well, is having an occasional flutter and aware to seek care for symptomatic afib.  Pt PM site w/o issues, wearing sling-aware of activity precautions and s/s infection.]   Is the patient/caregiver able to teach back signs and symptoms related to disease process for when to call PCP? Yes   Is the patient/caregiver able to teach back signs and symptoms related to disease process for when to call 911? Yes   Week 1 call completed? Yes   Call end time 1212            KATHY GEIGER - Registered Nurse

## 2023-12-07 ENCOUNTER — OFFICE VISIT (OUTPATIENT)
Dept: CARDIOLOGY | Facility: CLINIC | Age: 83
End: 2023-12-07
Payer: MEDICARE

## 2023-12-07 DIAGNOSIS — I49.5 TACHY-BRADY SYNDROME: Primary | ICD-10-CM

## 2023-12-07 NOTE — PROGRESS NOTES
2023    Kelsey Avendano, : 1940      Fever:     Temperature if indicated:     Wound Location: Left Infraclavicular    Dressing Removed: Removed by MA/RN      Old Dressing Appearance:  Clean, dry    Wound Appearance: Redness []                  Drainage []                  Culture obtained []        Color:      Consistency:      Amount:          Gloves used, wound cleansed with sterile 4x4 and peroxide [x]       MD notified []     MD orders:     Antibiotic started []      If checked, type     Other:       Appointment for follow-up scheduled for 3 months post procedure []    Future Appointments   Date Time Provider Department Center   2024  1:30 PM Dg Martinez MD [facility]   2024  2:45 PM Omar Gonzalez MD [facility]           Beverly Hartman MA, 23      MD Signature:______________________________ Completed By/Date:

## 2023-12-11 NOTE — DISCHARGE SUMMARY
[facility]  DISCHARGE SUMMARY    Date of Discharge: 11/30/23    Discharge Diagnosis: afib/tachybradycardia syndrome    Presenting Problem/History of Present Illness  Tachycardia-bradycardia syndrome [I49.5]    Problem List:  Paroxysmal atrial fibrillation/flutter/tachybradycardia syndrome  CHADSvasc = 7   5/10/2022 echo EF 61 to 65%.  Right atrium moderately dilated, left atrium moderately volume increased.  Moderate MR, moderate TR.  RVSP 35 to 45 mmHg.  Mild LVH.  Treated at [facility] with IV diltiazem, eventually discharged on NOAC with plan for outpatient external cardioversion.  5/18/2022 return for revisit in sinus bradycardia.  Digoxin discontinued secondary to CKD and elevated serum level.  [facility] ED presentation 8/8/23 with EKG revealing Atrial Fibrillation. Converted to NSR without intervention.  [facility] ED presentation 8/14/23 with palpitations- EKG revealing NSR  Symptomatic bradycardia on amiodarone  Amiodarone discontinued October 2023  S/p BSC  DC PPM 11/28/2023  Initiated Tikosyn 11/2023  TIA  Occurred in setting of inconsistent aspirin use, right-sided visual deficit (2017)  MRI, 02/28/2018: Age-appropriate diffuse generalized cerebral atrophy with gliosis in the white matter and ill defined oat infarct in the cerebellum on the left with no acute areas of ischemia  MRA, 02/28/2018: Neck, slightly tortuous vessels consistent with atherosclerotic changes and no focal areas of stenosis. Antegrade flow noted bilaterally.  Echocardiogram, 02/28/2018: EF 60-65%, grade 2 diastolic dysfunction with mildly dilated right atrial cavity is moderate pulmonary hypertension.  Holter 3/2018: Normal sinus rhythm/sinus bradycardia at 58 bpm.  JEANETTE, 11/06/2018: EF 60%. Moderate AI, aortic valve leaflets appear rheumatic. Mild-to-moderate MR/TR. Most likely source of patient's TIA is aortic atheroma  Aortic insufficiency:  JEANETTE, 11/06/2018: EF 60%. Moderate AI,  aortic valve leaflets appear rheumatic. Mild-to-moderate MR/TR.   Echocardiogram, 09/17/2019: EF 60%. Mild-to-mod AI. Trace-to-mild MR. Mild TR.  Chronic diastolic heart failure  [facility] in 2008: Reportedly normal, data insufficient  Hypertension  Hyperlipidemia  Chronic kidney disease  GERD  Lupus  Anxiety/depression  Surgical history  Partial bilateral knee replacements  Vaginal surgery  Breast biopsy  Total hysterectomy  Knee replacement    Hospital Course  Patient is a 83 y.o. female presented for scheduled DC PPM 11/28/23. Surgery went well and she was admitted for initiation of Tikosyn therapy for afib. The patient was admitted to the telemetry floor for careful monitoring. The patient's  labs were reviewed, pharmacy was consulted and the patient's dose was calculated. Tikosyn was initiated and the QTc interval was followed closely.  Patient remained in sinus rhythm throughout her stay.  During admission the patient and family received education about Tikosyn and potential medication interactions and QT prolonging meds. With regards to anticoagulation, patient is on Eliquis but does have a history of chronic anemia and Hospitalist was consulted for assistance in managing anemia while inpatient. Anemia determined to likely be a result of chronic hemorrhoids and patient has been referred to Dr. Myles outpatient. H&H stabilized prior to discharge and Eliquis was resumed. At the time of discharge the patient is stable and appropriate for discharge to home.  PPM wound care and post device care were reviewed with the patient in detail prior to discharge.        Procedures Performed  Procedure(s):  Pacemaker DC new and Tikosyn admission.       Consults:   Consults       Date and Time Order Name Status Description    11/28/2023  1:53 PM Inpatient Hospitalist Consult Completed             Pertinent Test Results:   XR CHEST PA AND LATERAL     Date of Exam: 11/29/2023 9:52 AM EST     Indication: ARTIFICIAL CARDIAC PACEMAKER  PRESENT  Pacemaker     Comparison: Chest radiograph dated 9/7/2023     Findings:  There is a new left chest wall pacemaker with dual leads in expected position. The cardiomediastinal silhouette is within normal limits. Pulmonary vascularity appears normal. There is no acute airspace consolidation or pleural effusion. There is no   evidence of pneumothorax. There are degenerative changes of the thoracic spine.     IMPRESSION:  Impression:  1. New left chest wall pacemaker with leads in expected position. No evidence of pneumothorax.    Condition on Discharge:  Stable    Discharge Disposition: Home    Discharge Diet: Cardiac heart healthy diet    Activity at Discharge: As tolerated    Follow-up Appointments  Future Appointments   Date Time Provider Department Center   2/7/2024  1:30 PM Dg Martinez MD [facility]   2/28/2024  2:45 PM Omar Gonzalez MD [facility]     Additional Instructions for the Follow-ups that You Need to Schedule       Discharge Follow-up with Specialty: Dr. Gonzalez; 3 Months   As directed      Specialty: Dr. Gonzalez   Follow Up: 3 Months   Follow Up Details: Wound check in 7-10 days in [place]. Follow up with Dr. Gonzalez s/p [facility] PPM and Tikosyn        Discharge Follow-up with Specified Provider: Dr. Myles   As directed      To: Dr. Myles   Follow Up Details: Hemorrhoidal ligation several months ago with recurrent anemia                Discharge Medications     Discharge Medications        New Medications        Instructions Start Date   ClearLax 17 GM/SCOOP powder  Generic drug: polyethylene glycol   17 g, Oral, Daily      dofetilide 125 MCG capsule  Commonly known as: TIKOSYN   125 mcg, Oral, Every 12 Hours Scheduled             Changes to Medications        Instructions Start Date   amLODIPine 5 MG tablet  Commonly known as: NORVASC  What changed: when to take this   5 mg, Oral, 2 Times Daily      carvedilol 25 MG tablet  Commonly known as: COREG  What changed: Another  medication with the same name was removed. Continue taking this medication, and follow the directions you see here.   25 mg, Oral, 2 Times Daily With Meals, 1/2 tablet prn depending on bp       ferrous gluconate 324 (37.5 Fe) MG tablet tablet  What changed: when to take this   324 mg, Oral, Daily             Continue These Medications        Instructions Start Date   acetaminophen 650 MG 8 hr tablet  Commonly known as: TYLENOL   650 mg, Oral, As Needed      apixaban 5 MG tablet tablet  Commonly known as: ELIQUIS   5 mg, Oral, 2 Times Daily      APPLE CIDER VINEGAR PO   Oral, Daily, 2 gummies       cetirizine 10 MG tablet  Commonly known as: zyrTEC   10 mg, Oral, Daily      escitalopram 20 MG tablet  Commonly known as: LEXAPRO   20 mg, Oral, Nightly      furosemide 20 MG tablet  Commonly known as: LASIX   10 mg, Oral, Nightly PRN, 1/2 tablet prn       guaiFENesin 400 MG tablet  Commonly known as: HUMIBID 3   200 mg, Oral, As Needed, 1/2 tablet nightly prn       hydroxychloroquine 200 MG tablet  Commonly known as: PLAQUENIL   200 mg, Oral, 2 Times Daily      pantoprazole 40 MG EC tablet  Commonly known as: PROTONIX   40 mg, Oral, 2 times daily      rosuvastatin 20 MG tablet  Commonly known as: CRESTOR   20 mg, Oral, Nightly             ASK your doctor about these medications        Instructions Start Date   hydrocortisone 25 MG suppository  Commonly known as: ANUSOL-HC  Ask about: Should I take this medication?   25 mg, Rectal, 2 Times Daily             Discharge took less than 30 minutes.     Electronically signed by DORIS Husain, 12/11/23, 1:17 PM EST.

## 2023-12-20 ENCOUNTER — APPOINTMENT (OUTPATIENT)
Dept: CT IMAGING | Facility: HOSPITAL | Age: 83
End: 2023-12-20
Payer: MEDICARE

## 2023-12-20 ENCOUNTER — HOSPITAL ENCOUNTER (EMERGENCY)
Facility: HOSPITAL | Age: 83
Discharge: HOME | End: 2023-12-21
Attending: EMERGENCY MEDICINE
Payer: MEDICARE

## 2023-12-20 ENCOUNTER — APPOINTMENT (OUTPATIENT)
Dept: GENERAL RADIOLOGY | Facility: HOSPITAL | Age: 83
End: 2023-12-20
Payer: MEDICARE

## 2023-12-20 DIAGNOSIS — S61.412A LACERATION OF LEFT HAND WITHOUT FOREIGN BODY, INITIAL ENCOUNTER: ICD-10-CM

## 2023-12-20 DIAGNOSIS — S62.305A CLOSED NONDISPLACED FRACTURE OF FOURTH METACARPAL BONE OF LEFT HAND, UNSPECIFIED PORTION OF METACARPAL, INITIAL ENCOUNTER: Primary | ICD-10-CM

## 2023-12-20 LAB
ALBUMIN SERPL-MCNC: 4.2 G/DL (ref 3.5–5.2)
ALBUMIN/GLOB SERPL: 1.4 G/DL
ALP SERPL-CCNC: 83 U/L (ref 39–117)
ALT SERPL W P-5'-P-CCNC: 27 U/L (ref 1–33)
ANION GAP SERPL CALCULATED.3IONS-SCNC: 9.1 MMOL/L (ref 5–15)
APTT PPP: 35.8 SECONDS (ref 26.5–34.5)
AST SERPL-CCNC: 55 U/L (ref 1–32)
BASOPHILS # BLD AUTO: 0.06 10*3/MM3 (ref 0–0.2)
BASOPHILS NFR BLD AUTO: 1.1 % (ref 0–1.5)
BILIRUB SERPL-MCNC: 0.3 MG/DL (ref 0–1.2)
BUN SERPL-MCNC: 27 MG/DL (ref 8–23)
BUN/CREAT SERPL: 20.1 (ref 7–25)
CALCIUM SPEC-SCNC: 9.5 MG/DL (ref 8.6–10.5)
CHLORIDE SERPL-SCNC: 100 MMOL/L (ref 98–107)
CO2 SERPL-SCNC: 28.9 MMOL/L (ref 22–29)
CREAT SERPL-MCNC: 1.34 MG/DL (ref 0.57–1)
DEPRECATED RDW RBC AUTO: 47.6 FL (ref 37–54)
EGFRCR SERPLBLD CKD-EPI 2021: 39.7 ML/MIN/1.73
EOSINOPHIL # BLD AUTO: 0.23 10*3/MM3 (ref 0–0.4)
EOSINOPHIL NFR BLD AUTO: 4.1 % (ref 0.3–6.2)
ERYTHROCYTE [DISTWIDTH] IN BLOOD BY AUTOMATED COUNT: 13.2 % (ref 12.3–15.4)
GLOBULIN UR ELPH-MCNC: 3.1 GM/DL
GLUCOSE SERPL-MCNC: 83 MG/DL (ref 65–99)
HCT VFR BLD AUTO: 32.7 % (ref 34–46.6)
HGB BLD-MCNC: 9.9 G/DL (ref 12–15.9)
HOLD SPECIMEN: NORMAL
HOLD SPECIMEN: NORMAL
IMM GRANULOCYTES # BLD AUTO: 0.01 10*3/MM3 (ref 0–0.05)
IMM GRANULOCYTES NFR BLD AUTO: 0.2 % (ref 0–0.5)
INR PPP: 1.13 (ref 0.9–1.1)
LYMPHOCYTES # BLD AUTO: 1.01 10*3/MM3 (ref 0.7–3.1)
LYMPHOCYTES NFR BLD AUTO: 18.1 % (ref 19.6–45.3)
MCH RBC QN AUTO: 29.6 PG (ref 26.6–33)
MCHC RBC AUTO-ENTMCNC: 30.3 G/DL (ref 31.5–35.7)
MCV RBC AUTO: 97.9 FL (ref 79–97)
MONOCYTES # BLD AUTO: 0.68 10*3/MM3 (ref 0.1–0.9)
MONOCYTES NFR BLD AUTO: 12.2 % (ref 5–12)
NEUTROPHILS NFR BLD AUTO: 3.59 10*3/MM3 (ref 1.7–7)
NEUTROPHILS NFR BLD AUTO: 64.3 % (ref 42.7–76)
NRBC BLD AUTO-RTO: 0 /100 WBC (ref 0–0.2)
PLATELET # BLD AUTO: 155 10*3/MM3 (ref 140–450)
PMV BLD AUTO: 9.9 FL (ref 6–12)
POTASSIUM SERPL-SCNC: 4.2 MMOL/L (ref 3.5–5.2)
PROT SERPL-MCNC: 7.3 G/DL (ref 6–8.5)
PROTHROMBIN TIME: 15 SECONDS (ref 12.1–14.7)
RBC # BLD AUTO: 3.34 10*6/MM3 (ref 3.77–5.28)
SODIUM SERPL-SCNC: 138 MMOL/L (ref 136–145)
WBC NRBC COR # BLD AUTO: 5.58 10*3/MM3 (ref 3.4–10.8)
WHOLE BLOOD HOLD COAG: NORMAL
WHOLE BLOOD HOLD SPECIMEN: NORMAL

## 2023-12-20 PROCEDURE — 85730 THROMBOPLASTIN TIME PARTIAL: CPT | Performed by: EMERGENCY MEDICINE

## 2023-12-20 PROCEDURE — 73130 X-RAY EXAM OF HAND: CPT | Performed by: RADIOLOGY

## 2023-12-20 PROCEDURE — 73130 X-RAY EXAM OF HAND: CPT

## 2023-12-20 PROCEDURE — 70450 CT HEAD/BRAIN W/O DYE: CPT

## 2023-12-20 PROCEDURE — 73110 X-RAY EXAM OF WRIST: CPT

## 2023-12-20 PROCEDURE — 73110 X-RAY EXAM OF WRIST: CPT | Performed by: RADIOLOGY

## 2023-12-20 PROCEDURE — 99284 EMERGENCY DEPT VISIT MOD MDM: CPT

## 2023-12-20 PROCEDURE — 70450 CT HEAD/BRAIN W/O DYE: CPT | Performed by: RADIOLOGY

## 2023-12-20 PROCEDURE — 93005 ELECTROCARDIOGRAM TRACING: CPT | Performed by: EMERGENCY MEDICINE

## 2023-12-20 PROCEDURE — 80053 COMPREHEN METABOLIC PANEL: CPT | Performed by: EMERGENCY MEDICINE

## 2023-12-20 PROCEDURE — 85610 PROTHROMBIN TIME: CPT | Performed by: EMERGENCY MEDICINE

## 2023-12-20 PROCEDURE — 85025 COMPLETE CBC W/AUTO DIFF WBC: CPT | Performed by: EMERGENCY MEDICINE

## 2023-12-21 VITALS
WEIGHT: 135 LBS | HEIGHT: 66 IN | HEART RATE: 80 BPM | SYSTOLIC BLOOD PRESSURE: 150 MMHG | DIASTOLIC BLOOD PRESSURE: 60 MMHG | OXYGEN SATURATION: 98 % | RESPIRATION RATE: 16 BRPM | TEMPERATURE: 98.4 F | BODY MASS INDEX: 21.69 KG/M2

## 2023-12-21 LAB
BILIRUB UR QL STRIP: NEGATIVE
CLARITY UR: CLEAR
COLOR UR: YELLOW
GLUCOSE UR STRIP-MCNC: NEGATIVE MG/DL
HGB UR QL STRIP.AUTO: NEGATIVE
KETONES UR QL STRIP: NEGATIVE
LEUKOCYTE ESTERASE UR QL STRIP.AUTO: NEGATIVE
NITRITE UR QL STRIP: NEGATIVE
PH UR STRIP.AUTO: 6.5 [PH] (ref 5–8)
PROT UR QL STRIP: NEGATIVE
QT INTERVAL: 410 MS
QTC INTERVAL: 442 MS
SP GR UR STRIP: 1.01 (ref 1–1.03)
UROBILINOGEN UR QL STRIP: NORMAL

## 2023-12-21 PROCEDURE — 81003 URINALYSIS AUTO W/O SCOPE: CPT | Performed by: EMERGENCY MEDICINE

## 2023-12-21 PROCEDURE — 99284 EMERGENCY DEPT VISIT MOD MDM: CPT

## 2023-12-21 RX ORDER — LIDOCAINE HYDROCHLORIDE 10 MG/ML
10 INJECTION, SOLUTION EPIDURAL; INFILTRATION; INTRACAUDAL; PERINEURAL ONCE
Status: COMPLETED | OUTPATIENT
Start: 2023-12-21 | End: 2023-12-21

## 2023-12-21 RX ORDER — HYDROCODONE BITARTRATE AND ACETAMINOPHEN 5; 325 MG/1; MG/1
1 TABLET ORAL EVERY 8 HOURS PRN
Qty: 12 TABLET | Refills: 0 | Status: SHIPPED | OUTPATIENT
Start: 2023-12-21

## 2023-12-21 RX ORDER — HYDROCODONE BITARTRATE AND ACETAMINOPHEN 5; 325 MG/1; MG/1
1 TABLET ORAL ONCE
Status: COMPLETED | OUTPATIENT
Start: 2023-12-21 | End: 2023-12-21

## 2023-12-21 RX ADMIN — HYDROCODONE BITARTRATE AND ACETAMINOPHEN 1 TABLET: 5; 325 TABLET ORAL at 00:50

## 2023-12-21 RX ADMIN — LIDOCAINE HYDROCHLORIDE 10 ML: 10 INJECTION, SOLUTION EPIDURAL; INFILTRATION; INTRACAUDAL; PERINEURAL at 00:54

## 2023-12-21 NOTE — ED NOTES
MEDICAL SCREENING:    Reason for Visit: Fall    Patient initially seen in triage.  The patient was advised further evaluation and diagnostic testing will be needed, some of the treatment and testing will be initiated in the lobby in order to begin the process.  The patient will be returned to the waiting area for the time being and possibly be re-assessed by a subsequent ED provider.  The patient will be brought back to the treatment area in as timely manner as possible.     Martha Dover, APRN  12/20/23 [id number]

## 2023-12-21 NOTE — ED PROVIDER NOTES
Subjective   History of Present Illness  83-year-old female presents to the emergency department chief complaint of fall.  Patient was leaving a store when she fell and struck concrete.  Patient is on anticoagulation with Eliquis 2.5 mg twice a day.  Patient is complaining of pain over the left eye.  Patient also also having bilateral hand pain where she tried to catch herself.  There is a laceration to the proximal palmar surface to the fifth digit.  This is on the left hand.  Bleeding is controlled.  No active bleeding noted on physical exam.  Patient states she did take Tylenol around 6 PM which did have some relief as far as her pain was concerned.  Patient did not lose consciousness.        Review of Systems   Constitutional: Negative.  Negative for fever.   HENT: Negative.     Respiratory: Negative.     Cardiovascular: Negative.  Negative for chest pain.   Gastrointestinal: Negative.  Negative for abdominal pain.   Endocrine: Negative.    Genitourinary: Negative.  Negative for dysuria.   Musculoskeletal:  Positive for arthralgias.   Skin:  Positive for wound.   Neurological: Negative.    Psychiatric/Behavioral: Negative.     All other systems reviewed and are negative.      Past Medical History:   Diagnosis Date    Anemia     Annual GYN exam w/o problem 04/24/2017    Anxiety     Aortic insufficiency     Arthritis     B12 deficiency     Bradycardia     Bronchitis     CHF (congestive heart failure)     Dental root implant present     x3- bottom and top    Depression 2006    Dizzy     GERD (gastroesophageal reflux disease)     History of shingles     2008- face    History of transfusion     about 2 years ago was told had to have transfusion but doesnt recall details with it - no reaction recalled    Hypertension     Insomnia     Itching     bilat arm    Leaky heart valve     thinks r/t rhuematic fever possible    Lupus     MRSA (methicillin resistant staph aureus) culture positive     2017    Osteoporosis      "Renal failure     Rheumatic fever     as child    SOBOE (shortness of breath on exertion)     Stroke     TIA    TIA (transient ischemic attack)     2017    Wears glasses     readers       Allergies   Allergen Reactions    Maxzide [Hydrochlorothiazide W-Triamterene] Other (See Comments)     Kidney issues 06/30/20      Hydralazine Hcl Photosensitivity, Palpitations, GI Intolerance and Headache     Chest pains, headache, diarrhea    Hytrin [Terazosin] Palpitations     Visual issues- \"bright spots in eyes\" 6/2020    Nifedipine Er Rash     flushing       Past Surgical History:   Procedure Laterality Date    ANTERIOR AND POSTERIOR VAGINAL REPAIR  04/2008    Along with vaginal vault suspension and PULLP    APPENDECTOMY      posisble with hysterectomy    BILATERAL SALPINGO OOPHORECTOMY Bilateral 1984    BREAST BIOPSY Right 1989    benign    CARDIAC CATHETERIZATION  2008    no stents    CARDIAC ELECTROPHYSIOLOGY PROCEDURE N/A 11/28/2023    Procedure: Pacemaker DC new and Tikosyn admission in 6 weeks. Hold Eliquis one day prior. She is stopping Amiodarone today.;  Surgeon: Omar Gonzalez MD;  Location: [facility] INVASIVE LOCATION;  Service: Cardiology;  Laterality: N/A;    CATARACT EXTRACTION, BILATERAL Bilateral     COLONOSCOPY      DEEP NECK LYMPH NODE BIOPSY / EXCISION  2002    ENDOSCOPY      KNEE ARTHROPLASTY, PARTIAL REPLACEMENT Bilateral     TOTAL ABDOMINAL HYSTERECTOMY FORDE PROCEDURE  1979       Family History   Problem Relation Age of Onset    Arthritis Mother     Heart attack Mother     Diabetes Father     Heart attack Father     Heart failure Sister     Heart disease Brother     Heart failure Brother     Heart failure Sister     Breast cancer Neg Hx     Ovarian cancer Neg Hx        Social History     Socioeconomic History    Marital status:    Tobacco Use    Smoking status: Never    Smokeless tobacco: Never   Vaping Use    Vaping Use: Never used   Substance and Sexual Activity    Alcohol use: No    Drug " use: No    Sexual activity: Defer           Objective   Physical Exam  Vitals and nursing note reviewed.   Constitutional:       General: She is not in acute distress.     Appearance: She is well-developed. She is not diaphoretic.   HENT:      Head: Normocephalic and atraumatic.      Right Ear: External ear normal.      Left Ear: External ear normal.      Nose: Nose normal.   Eyes:      Extraocular Movements: Extraocular movements intact.      Conjunctiva/sclera: Conjunctivae normal.      Pupils: Pupils are equal, round, and reactive to light.   Neck:      Vascular: No JVD.      Trachea: No tracheal deviation.   Cardiovascular:      Rate and Rhythm: Normal rate.      Heart sounds: No murmur heard.  Pulmonary:      Effort: Pulmonary effort is normal. No respiratory distress.      Breath sounds: No wheezing.   Abdominal:      Palpations: Abdomen is soft.      Tenderness: There is no abdominal tenderness.   Musculoskeletal:         General: Swelling and tenderness present. No deformity.      Cervical back: Normal range of motion and neck supple.      Comments: Tenderness and swelling of bilateral hands.  There is localized tenderness to the dorsum of the left hand.   Skin:     General: Skin is warm and dry.      Coloration: Skin is not pale.      Findings: No erythema or rash.      Comments: Small laceration to the lateral left eyebrow.  Laceration does not need to be repaired.  There is a laceration to the proximal palmar surface of the left hand just inferior to the fifth digit.  Bleeding is controlled at this time.  Laceration is gaping and could use sutures.   Neurological:      Mental Status: She is alert and oriented to person, place, and time.      Cranial Nerves: No cranial nerve deficit.   Psychiatric:         Behavior: Behavior normal.         Thought Content: Thought content normal.         Laceration Repair    Date/Time: 12/21/2023 1:19 AM    Performed by: Julian Carrera II, PA  Authorized by: Katelin  Kaushik Ugalde MD    Consent:     Consent obtained:  Verbal    Consent given by:  Patient    Risks discussed:  Pain  Universal protocol:     Patient identity confirmed:  Verbally with patient  Laceration details:     Location:  Hand    Hand location:  L palm    Length (cm):  2  Pre-procedure details:     Preparation:  Patient was prepped and draped in usual sterile fashion  Exploration:     Hemostasis achieved with:  Direct pressure    Imaging outcome: foreign body not noted      Wound exploration: wound explored through full range of motion      Contaminated: no    Treatment:     Area cleansed with:  Soap and water    Amount of cleaning:  Standard    Irrigation solution:  Sterile saline  Skin repair:     Repair method:  Sutures    Suture size:  4-0    Suture material:  Nylon    Suture technique:  Simple interrupted    Number of sutures:  3  Repair type:     Repair type:  Simple  Post-procedure details:     Dressing:  Non-adherent dressing    Procedure completion:  Tolerated well, no immediate complications             ED Course  ED Course as of 12/21/23 0120   Wed Dec 20, 2023   2153 ECG 12 Lead Other; Fall with head injury  Vent. Rate :  70 BPM     Atrial Rate :  70 BPM     P-R Int : 242 ms          QRS Dur :  88 ms      QT Int : 410 ms       P-R-T Axes :  75  80  63 degrees     QTc Int : 442 ms     Atrial-paced rhythm with prolonged AV conduction  Septal infarct , age undetermined  Abnormal ECG  When compared with ECG of 01-DEC-2023 13:46,  Septal infarct is now present  Nonspecific T wave abnormality, worse in Inferior leads  Nonspecific T wave abnormality now evident in Lateral leads   [ES]   u Dec 21, 2023   0022 XR wrist rad inerpreted:  1.  Findings concerning for nondisplaced fracture the base of the fourth  left metacarpal.  2.  Moderate to severe degenerative changes.   3.  Left wrist/hand soft tissue swelling.   [RB]      ED Course User Index  [ES] Kaushik Thomason MD  [RB] Julian Carrera II,  PA                                             Medical Decision Making  83-year-old female with mechanical fall.    Problems Addressed:  Closed nondisplaced fracture of fourth metacarpal bone of left hand, unspecified portion of metacarpal, initial encounter: acute illness or injury     Details: Imaging is positive for a closed nondisplaced fracture of the fourth metacarpal.  Patient was placed in a ulnar gutter splint.  Outpatient Ortho follow-up recommended.  Laceration of left hand without foreign body, initial encounter: acute illness or injury     Details: Laceration of the palmar surface of the left hand.  This was closed with 4 point 0 nylon sutures.  3 sutures were applied patient tolerated well.    Amount and/or Complexity of Data Reviewed  Labs: ordered.  Radiology: ordered.  ECG/medicine tests: ordered. Decision-making details documented in ED Course.    Risk  Prescription drug management.        Final diagnoses:   Closed nondisplaced fracture of fourth metacarpal bone of left hand, unspecified portion of metacarpal, initial encounter   Laceration of left hand without foreign body, initial encounter       ED Disposition  ED Disposition       ED Disposition   Discharge    Condition   Stable    Comment   --               Sudhir Arita MD  [address] [facility] [id number] [phone]    Go to       Giovanny Jordan DO  [address] [facility] [address] [phone]    Schedule an appointment as soon as possible for a visit            Medication List      No changes were made to your prescriptions during this visit.            Julina Carrera II, PA  12/21/23 0120

## 2024-01-10 ENCOUNTER — TELEPHONE (OUTPATIENT)
Dept: CARDIOLOGY | Facility: CLINIC | Age: 84
End: 2024-01-10
Payer: MEDICARE

## 2024-01-10 NOTE — TELEPHONE ENCOUNTER
"  Caller: Kelsey Avendano \"Pat\"    Relationship: Self    Best call back number: [phone]    Which medication are you concerned about: DOFETILIDE    Who prescribed you this medication: RIMA WALTON    When did you start taking this medication: 11-30-23    What are your concerns: PATIENT STATES SHE BELIEVES HER HEART IS FLUTTERING AND THE MEDICATION IS NOT WORKING AS IT SHOULD    How long have you had these concerns: APPROX 2 WEEKS.         "

## 2024-01-10 NOTE — TELEPHONE ENCOUNTER
Patient had a pacemaker placed and had a Tikosyn admission on 11/28/23. She was discharged home on dofetilide 125 mcg BID.    She states that for the past 2 weeks she has been out of rhythm more frequently. She can feel her heart skipping more and sometimes  feels like she is having pauses.    Her son was in a car accident and she has not been home to set up her remote monitoring.    She is taking all of her medications as prescribed and has not been sick recently.    Today her BP is 126/85 and her HR is 77 bpm. She is in NSR.    She is concerned and would like to know if her medications need to be adjusted?    Should I have her come in for a device check?

## 2024-01-11 ENCOUNTER — CLINICAL SUPPORT NO REQUIREMENTS (OUTPATIENT)
Dept: CARDIOLOGY | Facility: CLINIC | Age: 84
End: 2024-01-11
Payer: MEDICARE

## 2024-01-11 DIAGNOSIS — I49.5 TACHYCARDIA-BRADYCARDIA SYNDROME: Primary | ICD-10-CM

## 2024-01-11 RX ORDER — DOFETILIDE 0.12 MG/1
125 CAPSULE ORAL EVERY 12 HOURS SCHEDULED
Qty: 60 CAPSULE | Refills: 6 | Status: SHIPPED | OUTPATIENT
Start: 2024-01-11

## 2024-01-11 NOTE — TELEPHONE ENCOUNTER
Device interrogated and Normal check.  1 episode of AF for 15 sec on 1/8/24.  Atrial and ventricular outputs decreased since device was implanted 11/28/23.

## 2024-02-16 ENCOUNTER — TELEPHONE (OUTPATIENT)
Dept: CARDIOLOGY | Facility: CLINIC | Age: 84
End: 2024-02-16
Payer: MEDICARE

## 2024-02-28 ENCOUNTER — OFFICE VISIT (OUTPATIENT)
Dept: CARDIOLOGY | Facility: CLINIC | Age: 84
End: 2024-02-28
Payer: MEDICARE

## 2024-02-28 VITALS
HEIGHT: 66 IN | HEART RATE: 72 BPM | WEIGHT: 136 LBS | SYSTOLIC BLOOD PRESSURE: 138 MMHG | DIASTOLIC BLOOD PRESSURE: 68 MMHG | BODY MASS INDEX: 21.86 KG/M2 | OXYGEN SATURATION: 92 %

## 2024-02-28 DIAGNOSIS — I49.5 TACHY-BRADY SYNDROME: ICD-10-CM

## 2024-02-28 DIAGNOSIS — I48.0 PAROXYSMAL ATRIAL FIBRILLATION: Primary | ICD-10-CM

## 2024-02-28 NOTE — PROGRESS NOTES
Kelsey Avendano  1940  [phone]    02/28/2024    [facility] CARDIOLOGY     Referring Provider: No ref. provider found     Sudhir Arita MD  [address] [place] [address]    Chief Complaint   Patient presents with    Atrial fibrillation with RVR       Problem List:   Paroxysmal atrial fibrillation/flutter/tachybradycardia syndrome  CHADSvasc = 7   5/10/2022 echo EF 61 to 65%.  Right atrium moderately dilated, left atrium moderately volume increased.  Moderate MR, moderate TR.  RVSP 35 to 45 mmHg.  Mild LVH.  Treated at [facility] with IV diltiazem, eventually discharged on NOAC with plan for outpatient external cardioversion.  5/18/2022 return for revisit in sinus bradycardia.  Digoxin discontinued secondary to CKD and elevated serum level.  [facility] ED presentation 8/8/23 with EKG revealing Atrial Fibrillation. Converted to NSR without intervention.  [facility] ED presentation 8/14/23 with palpitations- EKG revealing NSR  Symptomatic bradycardia on amiodarone  Amiodarone discontinued October 2023  S/p BSC  DC PPM 11/28/2023  Initiated Tikosyn 11/2023  TIA  Occurred in setting of inconsistent aspirin use, right-sided visual deficit (2017)  MRI, 02/28/2018: Age-appropriate diffuse generalized cerebral atrophy with gliosis in the white matter and ill defined oat infarct in the cerebellum on the left with no acute areas of ischemia  MRA, 02/28/2018: Neck, slightly tortuous vessels consistent with atherosclerotic changes and no focal areas of stenosis. Antegrade flow noted bilaterally.  Echocardiogram, 02/28/2018: EF 60-65%, grade 2 diastolic dysfunction with mildly dilated right atrial cavity is moderate pulmonary hypertension.  Holter 3/2018: Normal sinus rhythm/sinus bradycardia at 58 bpm.  JEANETTE, 11/06/2018: EF 60%. Moderate AI, aortic valve leaflets appear rheumatic. Mild-to-moderate MR/TR. Most likely source of patient's TIA is aortic atheroma  Aortic  "insufficiency:  JEANETTE, 11/06/2018: EF 60%. Moderate AI, aortic valve leaflets appear rheumatic. Mild-to-moderate MR/TR.   Echocardiogram, 09/17/2019: EF 60%. Mild-to-mod AI. Trace-to-mild MR. Mild TR.  Chronic diastolic heart failure  [facility] in 2008: Reportedly normal, data insufficient  Hypertension  Hyperlipidemia  Chronic kidney disease  GERD  Lupus  Anxiety/depression  Surgical history  Partial bilateral knee replacements  Vaginal surgery  Breast biopsy  Total hysterectomy  Knee replacement    Allergies  Allergies   Allergen Reactions    Maxzide [Hydrochlorothiazide W-Triamterene] Other (See Comments)     Kidney issues 06/30/20      Hydralazine Hcl Photosensitivity, Palpitations, GI Intolerance and Headache     Chest pains, headache, diarrhea    Hytrin [Terazosin] Palpitations     Visual issues- \"bright spots in eyes\" 6/2020    Nifedipine Er Rash     flushing       Current Medications    Current Outpatient Medications:     acetaminophen (TYLENOL) 650 MG 8 hr tablet, Take 1 tablet by mouth As Needed for Mild Pain., Disp: , Rfl:     amLODIPine (NORVASC) 5 MG tablet, Take 1 tablet by mouth 2 (Two) Times a Day., Disp: 60 tablet, Rfl: 5    apixaban (ELIQUIS) 5 MG tablet tablet, Take 1 tablet by mouth 2 (Two) Times a Day., Disp: , Rfl:     APPLE CIDER VINEGAR PO, Take  by mouth Daily. 2 gummies, Disp: , Rfl:     cetirizine (zyrTEC) 10 MG tablet, Take 1 tablet by mouth Daily., Disp: , Rfl:     dofetilide (TIKOSYN) 125 MCG capsule, Take 1 capsule by mouth Every 12 (Twelve) Hours., Disp: 60 capsule, Rfl: 6    escitalopram (LEXAPRO) 20 MG tablet, Take 1 tablet by mouth Every Night., Disp: , Rfl:     furosemide (LASIX) 20 MG tablet, Take 0.5 tablets by mouth At Night As Needed. 1/2 tablet prn, Disp: , Rfl:     guaiFENesin (HUMIBID 3) 400 MG tablet, Take 0.5 tablets by mouth As Needed for Cough. 1/2 tablet nightly prn, Disp: , Rfl:     hydroxychloroquine (PLAQUENIL) 200 MG tablet, Take 1 tablet by mouth 2 (Two) Times a Day., " "Disp: , Rfl:     metoprolol tartrate (LOPRESSOR) 25 MG tablet, Take 1 tablet by mouth 2 (Two) Times a Day. (Patient taking differently: Take 1 tablet by mouth Daily.), Disp: 60 tablet, Rfl: 6    pantoprazole (PROTONIX) 40 MG EC tablet, Take 1 tablet by mouth 2 (two) times a day., Disp: , Rfl:     rosuvastatin (CRESTOR) 20 MG tablet, Take 1 tablet by mouth Every Night., Disp: , Rfl:     ferrous gluconate 324 (37.5 Fe) MG tablet tablet, Take 1 tablet by mouth Daily. (Patient not taking: Reported on 2/28/2024), Disp: , Rfl:     HYDROcodone-acetaminophen (NORCO) 5-325 MG per tablet, Take 1 tablet by mouth Every 8 (Eight) Hours As Needed for Moderate Pain. (Patient not taking: Reported on 2/28/2024), Disp: 12 tablet, Rfl: 0    polyethylene glycol (MIRALAX) 17 GM/SCOOP powder, Take 17 g by mouth Daily. (Patient not taking: Reported on 2/28/2024), Disp: 238 g, Rfl: 0    History of Present Illness:      Pt presents for follow up of atrial fibrillation, tachy danielle syndrome, PM check, . Since we last saw the pt, she has had a few episodes of AFIB, but less frequent and shorter since being on Tikosyn. She has mild SOB and a recent cough. She denies CP, LH, and dizziness, syncope.  Denies any hospitalizations, ER visits, bleeding, or TIA/CVA symptoms. Overall feels better since being on Tikosyn. BPs have been in the 120-130s mostly. She has some mild ankle swelling.     ROS:  General:  Denies fatigue, weight gain or loss  Cardiovascular:  Denies CP, PND, syncope, near syncope,+  edema or palpitations.  Pulmonary:  Denies CHAVIS, cough, or wheezing      Vitals:    02/28/24 1426   BP: 138/68   BP Location: Right arm   Patient Position: Sitting   Pulse: 72   SpO2: 92%   Weight: 61.7 kg (136 lb)   Height: 167.6 cm (66\")     Body mass index is 21.95 kg/m².  PE:  General: NAD. A & O x 3   Neck: no JVD, no carotid bruits, no TM  Heart RRR, NL S1, S2, S4 present, no rubs, murmurs  Lungs: CTA, no wheezes, rhonchi, or rales  Abd: soft, " non-tender, NL BS  Ext: No musculoskeletal deformities, no edema, cyanosis, or clubbing  Psych: normal mood and affect  Skin: well healed device site.     Diagnostic Data:    PM Manual Interrogation: RA paced 91%. RV paced less 1%.   Battery 9 years. 2% AFIB/ATACH. Accelerometer increased to 10.       ECG 12 Lead    Date/Time: 2/28/2024 3:11 PM  Performed by: Cathi Taylor PA    Authorized by: Cathi Taylor PA  Comparison: compared with previous ECG from 12/21/2023  Comparison to previous ECG: QT is stable  Rhythm: sinus rhythm and paced  BPM: 72                 1. Paroxysmal atrial fibrillation    2. Tachy-danielle syndrome          Plan:  Paroxysmal Atrial Fibrillation/Atrial Tachycardia:  - doing better on Tikosyn. QTc WNL on EKG today.  2% burden, symptoms are less frequent and milder in nature. Will increase Metoprolol to 25 mg BID.   - CHADSvasc = 7 on Eliquis     2. Tachy-danielle syndrome:   - PM with normal function     3. LE edema:  - will decrease Norvasc to 5 mg at night.   - continue to check BPs.   - take Lasix 3 days in a row, then back to as needed.     F/up in 6 months    Electronically signed by ARACELI Yao, 02/28/24, 3:05 PM EST.

## 2024-03-11 ENCOUNTER — TELEPHONE (OUTPATIENT)
Dept: CARDIOLOGY | Facility: CLINIC | Age: 84
End: 2024-03-11
Payer: MEDICARE

## 2024-03-11 NOTE — TELEPHONE ENCOUNTER
Patient was seen in clinic on 2/28/24 and you increased Metoprolol to 25 mg BID and decreased Norvasc to 5 mg QHS.    She called back to let you know that her BP's have been:  116/63, 129/69, 146/77, 143/75 and 117/65.    She said that she has still had a few episodes where she can tell her heart is skipping beats, but it is improving.

## 2024-03-12 NOTE — TELEPHONE ENCOUNTER
I tried to call the patient back at both numbers on file but she did not answer. I left a message for her to call me back at the office.

## 2024-03-21 PROCEDURE — 93294 REM INTERROG EVL PM/LDLS PM: CPT | Performed by: INTERNAL MEDICINE

## 2024-03-21 PROCEDURE — 93296 REM INTERROG EVL PM/IDS: CPT | Performed by: INTERNAL MEDICINE

## 2024-03-26 ENCOUNTER — TELEPHONE (OUTPATIENT)
Dept: CARDIOLOGY | Facility: CLINIC | Age: 84
End: 2024-03-26
Payer: MEDICARE

## 2024-03-26 NOTE — TELEPHONE ENCOUNTER
Patient had a pacemaker placed on 11/28/23.    She states that for the past 5 days she has been experiencing a burning/stinging sensation in the middle to left side of her chest. She denies chest pain, shortness of breath or pain that radiates down her left arm or up into her jaw. She states that the pain will range between a 5-8 on a scale of 1-10 when it occurs. She said that is just randomly happens but only lasts about 10 seconds with each episode. She said that the area where her pacemaker is located is completely healed and has no s/s of infection.    She has been taking all of her medications as prescribed. Her BP is 130/70.    She is aware to go to the closest ER to be evaluated it her symptoms continue to get any worse.     She would like to know if you think her symptoms could be related to her pacemaker in any way?

## 2024-03-28 NOTE — TELEPHONE ENCOUNTER
I tried to call the patient at both numbers on file but she did not answer. I left a messages for her to call me back at the office.

## 2024-04-03 ENCOUNTER — TRANSCRIBE ORDERS (OUTPATIENT)
Dept: ADMINISTRATIVE | Facility: HOSPITAL | Age: 84
End: 2024-04-03
Payer: MEDICARE

## 2024-04-03 DIAGNOSIS — Z12.31 VISIT FOR SCREENING MAMMOGRAM: Primary | ICD-10-CM

## 2024-05-01 ENCOUNTER — TRANSCRIBE ORDERS (OUTPATIENT)
Dept: ADMINISTRATIVE | Facility: HOSPITAL | Age: 84
End: 2024-05-01
Payer: MEDICARE

## 2024-05-01 DIAGNOSIS — Z78.0 ASYMPTOMATIC MENOPAUSAL STATE: Primary | ICD-10-CM

## 2024-06-10 ENCOUNTER — HOSPITAL ENCOUNTER (OUTPATIENT)
Dept: MAMMOGRAPHY | Facility: HOSPITAL | Age: 84
Discharge: HOME | End: 2024-06-10
Payer: MEDICARE

## 2024-06-10 ENCOUNTER — HOSPITAL ENCOUNTER (OUTPATIENT)
Dept: BONE DENSITY | Facility: HOSPITAL | Age: 84
Discharge: HOME | End: 2024-06-10
Payer: MEDICARE

## 2024-06-10 DIAGNOSIS — Z78.0 ASYMPTOMATIC MENOPAUSAL STATE: ICD-10-CM

## 2024-06-10 DIAGNOSIS — Z12.31 VISIT FOR SCREENING MAMMOGRAM: ICD-10-CM

## 2024-06-10 PROCEDURE — 77080 DXA BONE DENSITY AXIAL: CPT

## 2024-06-10 PROCEDURE — 77080 DXA BONE DENSITY AXIAL: CPT | Performed by: RADIOLOGY

## 2024-06-10 PROCEDURE — 77067 SCR MAMMO BI INCL CAD: CPT

## 2024-06-10 PROCEDURE — 77063 BREAST TOMOSYNTHESIS BI: CPT

## 2024-06-11 PROCEDURE — 77063 BREAST TOMOSYNTHESIS BI: CPT | Performed by: RADIOLOGY

## 2024-06-11 PROCEDURE — 77067 SCR MAMMO BI INCL CAD: CPT | Performed by: RADIOLOGY

## 2024-08-26 ENCOUNTER — OFFICE VISIT (OUTPATIENT)
Dept: CARDIOLOGY | Facility: CLINIC | Age: 84
End: 2024-08-26
Payer: MEDICARE

## 2024-08-26 VITALS
HEIGHT: 66 IN | SYSTOLIC BLOOD PRESSURE: 128 MMHG | HEART RATE: 73 BPM | WEIGHT: 142.8 LBS | BODY MASS INDEX: 22.95 KG/M2 | DIASTOLIC BLOOD PRESSURE: 56 MMHG | OXYGEN SATURATION: 99 %

## 2024-08-26 DIAGNOSIS — I10 ESSENTIAL HYPERTENSION: ICD-10-CM

## 2024-08-26 DIAGNOSIS — I49.5 TACHYCARDIA-BRADYCARDIA SYNDROME: ICD-10-CM

## 2024-08-26 DIAGNOSIS — G45.9 TIA (TRANSIENT ISCHEMIC ATTACK): Primary | ICD-10-CM

## 2024-08-26 DIAGNOSIS — E78.5 DYSLIPIDEMIA: ICD-10-CM

## 2024-08-26 PROCEDURE — 99214 OFFICE O/P EST MOD 30 MIN: CPT | Performed by: INTERNAL MEDICINE

## 2024-08-26 NOTE — PROGRESS NOTES
[facility] Cardiology  Subjective:     Encounter Date: 08/26/2024      Patient ID: Kelsey Avendano is a 84 y.o. female.    Chief Complaint: Tachycardia-bradycardia syndrome      PROBLEM LIST:  TIA; on aspirin (not daily); right sided visual deficit:  MRI, 02/28/2018: Age-appropriate diffuse generalized cerebral atrophy with gliosis in the white matter and ill defined oat infarct in the cerebellum on the left with no acute areas of ischemia  MRA, 02/28/2018: Neck, slightly tortuous vessels consistent with atherosclerotic changes and no focal areas of stenosis. Antegrade flow noted bilaterally.  Echocardiogram, 02/28/2018: EF 60-65%, grade 2 diastolic dysfunction with mildly dilated right atrial cavity is moderate pulmonary hypertension.  Holter 3/2018: Normal sinus rhythm/sinus bradycardia at 58 bpm.  JEANETTE, 11/06/2018: EF 60%. Moderate AI, aortic valve leaflets appear rheumatic. Mild-to-moderate MR/TR. Most likely source of patient's TIA is aortic atheroma.  Paroxysmal atrial fibrillation/flutter.  Tachybradycardia syndrome  5/10/2022 echo EF 61 to 65%.  Right atrium moderately dilated, left atrium moderately volume increased.  Moderate MR, moderate TR.  RVSP 35 to 45 mmHg.  Mild LVH.  Treated at [facility] with IV diltiazem, eventually discharged on NOAC with plan for outpatient external cardioversion.  5/18/2022 return for revisit in sinus bradycardia.  Digoxin discontinued secondary to CKD and elevated serum level.  [facility] ED presentation 8/8 with EKG revealing Atrial Fibrillation. Converted to NSR without intervention.  [facility] ED presentation 8/14 with palpitations- EKG revealing NSR.  Sinus rhythm maintained on amiodarone however symptomatic bradycardia.  Pacemaker implant, 11/28/2023: Successful implantation of a dual-chamber permanent pacemaker.   Aortic insufficiency:  JEANETTE, 11/06/2018: EF 60%. Moderate AI, aortic valve leaflets appear rheumatic.  "Mild-to-moderate MR/TR.   Echocardiogram, 09/17/2019: EF 60%. Mild-to-mod AI. Trace-to-mild MR. Mild TR.  Chronic diastolic heart failure  [facility] in 2008: Reportedly normal, data insufficient  Hypertension.  Hyperlipidemia.  CKD  Hx CVA.  GERD.  Lupus.  Iron deficiency anemia  AVM cauterization per patient 2024  Anxiety/depression  Surgeries:  Partial replacement bilateral knees  Vaginal surgery  Breast biopsy  Total hysterectomy  Knee replacement      History of Present Illness  Kelsey Avendano returns today for a follow up with a history of TIA, tachy-danielle syndrome, and cardiac risk factors. Since last visit, patient has been doing well overall from a cardiovascular standpoint. She reports in recent blood work with her PCP her blood levels were low due to ongoing hemorrhoid issues the past 2 months. Patient states her PCP is contemplating a blood infusion if her levels continue to get lower. She notes that she recently had cauterizations. Patient has noticed she has most trouble with her hemorrhoids when she is busy and on her feet for long periods of time. She states her back and shoulders have been itching for the past 2 months and believes this is due to Tikosyn or her lupus. Patient denies chest pain, shortness of breath, orthopnea, palpitations, edema, dizziness, and syncope.     Allergies   Allergen Reactions    Maxzide [Hydrochlorothiazide W-Triamterene] Other (See Comments)     Kidney issues 06/30/20      Hydralazine Hcl Photosensitivity, Palpitations, GI Intolerance and Headache     Chest pains, headache, diarrhea    Hytrin [Terazosin] Palpitations     Visual issues- \"bright spots in eyes\" 6/2020    Nifedipine Er Rash     flushing         Current Outpatient Medications:     acetaminophen (TYLENOL) 650 MG 8 hr tablet, Take 1 tablet by mouth As Needed for Mild Pain., Disp: , Rfl:     amLODIPine (NORVASC) 5 MG tablet, Take 1 tablet by mouth 2 (Two) Times a Day., Disp: 60 tablet, Rfl: 5    apixaban (ELIQUIS) " 5 MG tablet tablet, Take 1 tablet by mouth 2 (Two) Times a Day., Disp: , Rfl:     APPLE CIDER VINEGAR PO, Take  by mouth Daily. 2 gummies, Disp: , Rfl:     cetirizine (zyrTEC) 10 MG tablet, Take 1 tablet by mouth Daily., Disp: , Rfl:     dofetilide (TIKOSYN) 125 MCG capsule, Take 1 capsule by mouth Every 12 (Twelve) Hours., Disp: 60 capsule, Rfl: 6    escitalopram (LEXAPRO) 20 MG tablet, Take 1 tablet by mouth Every Night., Disp: , Rfl:     ferrous gluconate 324 (37.5 Fe) MG tablet tablet, Take 1 tablet by mouth Daily., Disp: , Rfl:     furosemide (LASIX) 20 MG tablet, Take 0.5 tablets by mouth At Night As Needed. 1/2 tablet prn, Disp: , Rfl:     guaiFENesin (HUMIBID 3) 400 MG tablet, Take 0.5 tablets by mouth As Needed for Cough. 1/2 tablet nightly prn, Disp: , Rfl:     hydroxychloroquine (PLAQUENIL) 200 MG tablet, Take 1 tablet by mouth 2 (Two) Times a Day., Disp: , Rfl:     metoprolol tartrate (LOPRESSOR) 25 MG tablet, Take 1 tablet by mouth 2 (Two) Times a Day., Disp: 60 tablet, Rfl: 6    pantoprazole (PROTONIX) 40 MG EC tablet, Take 1 tablet by mouth 2 (two) times a day., Disp: , Rfl:     rosuvastatin (CRESTOR) 20 MG tablet, Take 1 tablet by mouth Every Night., Disp: , Rfl:     HYDROcodone-acetaminophen (NORCO) 5-325 MG per tablet, Take 1 tablet by mouth Every 8 (Eight) Hours As Needed for Moderate Pain. (Patient not taking: Reported on 2/28/2024), Disp: 12 tablet, Rfl: 0    polyethylene glycol (MIRALAX) 17 GM/SCOOP powder, Take 17 g by mouth Daily. (Patient not taking: Reported on 2/28/2024), Disp: 238 g, Rfl: 0    The following portions of the patient's history were reviewed and updated as appropriate: allergies, current medications, past family history, past medical history, past social history, past surgical history and problem list.    ROS       Objective:     Vitals:    08/26/24 1447   BP: 128/56   BP Location: Left arm   Patient Position: Sitting   Cuff Size: Adult   Pulse: 73   SpO2: 99%   Weight: 64.8  "kg (142 lb 12.8 oz)   Height: 167.6 cm (66\")         Vitals reviewed.   Constitutional:       Appearance: Well-developed and not in distress.   Neck:      Thyroid: No thyromegaly.      Vascular: No carotid bruit or JVD.   Pulmonary:      Breath sounds: Normal breath sounds.   Cardiovascular:      Regular rhythm.      No gallop. No S3 and S4 gallop.   Pulses:     Intact distal pulses.      Carotid: 2+ bilaterally.     Radial: 2+ bilaterally.  Edema:     Peripheral edema absent.   Abdominal:      General: Bowel sounds are normal.      Palpations: Abdomen is soft. There is no abdominal mass.      Tenderness: There is no abdominal tenderness.   Musculoskeletal:         General: No deformity.      Extremities: No clubbing present.Skin:     General: Skin is warm and dry.      Findings: No rash.   Neurological:      Mental Status: Alert and oriented to person, place, and time.         Lab Review:  Lab Results   Component Value Date    GLUCOSE 83 12/20/2023    BUN 27 (H) 12/20/2023    CREATININE 1.34 (H) 12/20/2023    BCR 20.1 12/20/2023    K 4.2 12/20/2023    CO2 28.9 12/20/2023    CALCIUM 9.5 12/20/2023    ALBUMIN 4.2 12/20/2023    ALKPHOS 83 12/20/2023    AST 55 (H) 12/20/2023    ALT 27 12/20/2023     Lab Results   Component Value Date    WBC 5.58 12/20/2023    RBC 3.34 (L) 12/20/2023    HGB 9.9 (L) 12/20/2023    HCT 32.7 (L) 12/20/2023    MCV 97.9 (H) 12/20/2023     12/20/2023     Lab Results   Component Value Date    HGBA1C 5.00 11/20/2023        Procedures      Advance Care Planning   ACP discussion was held with the patient during this visit. Patient does not have an advance directive, declines further assistance.           Assessment:   Diagnoses and all orders for this visit:    1. TIA (transient ischemic attack) (Primary)    2. Tachycardia-bradycardia syndrome    3. Essential hypertension    4. Dyslipidemia        Impression:  1. TIA (transient ischemic attack) (Primary). Stable and asymptomatic. Continue " on Eliquis 5 mg BID for stroke prophylaxis.     2. Tachycardia-bradycardia syndrome. Stable and asymptomatic. Continue on metoprolol 25 mg BID for rate control and hypertension. Continue on Tikosyn 125 mcg BID for rhythm control.     3. Essential hypertension. Well controlled. Continue on amlodipine 5 mg BID for hypertension. Continue on Lasix 10 mg daily PRN for fluid retention.     4. Dyslipidemia. No labs for review. Continue on rosuvastatin 20 mg daily for hyperlipidemia.     Recently diagnosed iron deficiency anemia, with evaluation positive for AVMs per patient.  These have been cauterized but still persistently iron deficient, possibly due to hemorrhoidal bleeding.  On Eliquis.    Plan:  Stable cardiac status.   Obtain records from primary regarding her GI bleeding which she reports  Given her GI bleed, chronicity, and persistent anemia she would be a good candidate for Watchman device discussed with patient.  We have asked her to discuss this with her electrophysiology team who she sees later this wee.  Continue current medications.  Revisit in 6 MO, or sooner as needed.          Scribed for Dg Martinez MD by Florecita Dias. 8/26/2024 15:26 EDT  Dg Martinez MD    Please note that portions of this note may have been completed with a voice recognition program. Efforts were made to edit the dictations, but occasionally words are mistranscribed.

## 2024-08-28 ENCOUNTER — OFFICE VISIT (OUTPATIENT)
Dept: CARDIOLOGY | Facility: CLINIC | Age: 84
End: 2024-08-28
Payer: MEDICARE

## 2024-08-28 VITALS
DIASTOLIC BLOOD PRESSURE: 60 MMHG | BODY MASS INDEX: 22.6 KG/M2 | HEART RATE: 70 BPM | OXYGEN SATURATION: 98 % | HEIGHT: 66 IN | SYSTOLIC BLOOD PRESSURE: 152 MMHG | WEIGHT: 140.6 LBS

## 2024-08-28 DIAGNOSIS — I48.91 ATRIAL FIBRILLATION WITH RVR: Primary | ICD-10-CM

## 2024-08-28 DIAGNOSIS — I49.5 TACHYCARDIA-BRADYCARDIA SYNDROME: ICD-10-CM

## 2024-08-28 DIAGNOSIS — R00.2 PALPITATIONS: ICD-10-CM

## 2024-08-28 DIAGNOSIS — I10 ESSENTIAL HYPERTENSION: ICD-10-CM

## 2024-08-28 PROCEDURE — 99214 OFFICE O/P EST MOD 30 MIN: CPT | Performed by: PHYSICIAN ASSISTANT

## 2024-08-28 PROCEDURE — 93000 ELECTROCARDIOGRAM COMPLETE: CPT | Performed by: PHYSICIAN ASSISTANT

## 2024-08-28 PROCEDURE — 3078F DIAST BP <80 MM HG: CPT | Performed by: PHYSICIAN ASSISTANT

## 2024-08-28 PROCEDURE — 3077F SYST BP >= 140 MM HG: CPT | Performed by: PHYSICIAN ASSISTANT

## 2024-08-28 NOTE — PROGRESS NOTES
[facility] at [facility]   OFFICE NOTE      Kelsey Avendano  1940  PCP: Sudhir Arita MD    SUBJECTIVE:   Kelsey Avendano is a 84 y.o. female seen for a follow up visit regarding the following:     CC: A-fib    HPI:   Pleasant 84-year-old female seen in consultation today, evaluation regarding bleeding issues on Eliquis therapy with a high [id number] score.  The patient has had a difficult time with recurrent GI bleeding.  She needs Eliquis because of her A-fib history.  She denies chest pain dizziness near syncope CV.  Tach is continue to work well for her.  Pacemaker interrogation today is stable.    Cardiac PMH: (Old records have been reviewed and summarized below)  TIA; on aspirin (not daily); right sided visual deficit:  MRI, 02/28/2018: Age-appropriate diffuse generalized cerebral atrophy with gliosis in the white matter and ill defined oat infarct in the cerebellum on the left with no acute areas of ischemia  MRA, 02/28/2018: Neck, slightly tortuous vessels consistent with atherosclerotic changes and no focal areas of stenosis. Antegrade flow noted bilaterally.  Echocardiogram, 02/28/2018: EF 60-65%, grade 2 diastolic dysfunction with mildly dilated right atrial cavity is moderate pulmonary hypertension.  Holter 3/2018: Normal sinus rhythm/sinus bradycardia at 58 bpm.  JEANETTE, 11/06/2018: EF 60%. Moderate AI, aortic valve leaflets appear rheumatic. Mild-to-moderate MR/TR. Most likely source of patient's TIA is aortic atheroma.  Paroxysmal atrial fibrillation/flutter.  Tachybradycardia syndrome  5/10/2022 echo EF 61 to 65%.  Right atrium moderately dilated, left atrium moderately volume increased.  Moderate MR, moderate TR.  RVSP 35 to 45 mmHg.  Mild LVH.  Treated at [facility] with IV diltiazem, eventually discharged on NOAC with plan for outpatient external cardioversion.  5/18/2022 return for revisit in sinus bradycardia.  Digoxin discontinued secondary to CKD and  elevated serum level.  [facility] ED presentation 8/8 with EKG revealing Atrial Fibrillation. Converted to NSR without intervention.  [facility] ED presentation 8/14 with palpitations- EKG revealing NSR.  Sinus rhythm maintained on amiodarone however symptomatic bradycardia.  Pacemaker implant, 11/28/2023: Successful implantation of a dual-chamber permanent pacemaker.   [id number] score equal to 7  Aortic insufficiency:  JEANETTE, 11/06/2018: EF 60%. Moderate AI, aortic valve leaflets appear rheumatic. Mild-to-moderate MR/TR.   Echocardiogram, 09/17/2019: EF 60%. Mild-to-mod AI. Trace-to-mild MR. Mild TR.  Chronic diastolic heart failure  LH in 2008: Reportedly normal, data insufficient  Hypertension.  Hyperlipidemia.  CKD  Hx CVA.  GERD.  Lupus.  Iron deficiency anemia  AVM cauterization per patient 2024  Anxiety/depression  Surgeries:  Partial replacement bilateral knees  Vaginal surgery  Breast biopsy  Total hysterectomy  Knee replacement    Past Medical History, Past Surgical History, Family history, Social History, and Medications were all reviewed with the patient today and updated as necessary.       Current Outpatient Medications:     acetaminophen (TYLENOL) 650 MG 8 hr tablet, Take 1 tablet by mouth As Needed for Mild Pain., Disp: , Rfl:     amLODIPine (NORVASC) 5 MG tablet, Take 1 tablet by mouth 2 (Two) Times a Day., Disp: 60 tablet, Rfl: 5    apixaban (ELIQUIS) 5 MG tablet tablet, Take 1 tablet by mouth 2 (Two) Times a Day., Disp: , Rfl:     APPLE CIDER VINEGAR PO, Take  by mouth Daily. 2 gummies, Disp: , Rfl:     cetirizine (zyrTEC) 10 MG tablet, Take 1 tablet by mouth Daily., Disp: , Rfl:     dofetilide (TIKOSYN) 125 MCG capsule, Take 1 capsule by mouth Every 12 (Twelve) Hours., Disp: 60 capsule, Rfl: 6    escitalopram (LEXAPRO) 20 MG tablet, Take 1 tablet by mouth Every Night., Disp: , Rfl:     ferrous gluconate 324 (37.5 Fe) MG tablet tablet, Take 1 tablet by mouth Daily., Disp: , Rfl:      "furosemide (LASIX) 20 MG tablet, Take 0.5 tablets by mouth At Night As Needed. 1/2 tablet prn, Disp: , Rfl:     guaiFENesin (HUMIBID 3) 400 MG tablet, Take 0.5 tablets by mouth As Needed for Cough. 1/2 tablet nightly prn, Disp: , Rfl:     hydroxychloroquine (PLAQUENIL) 200 MG tablet, Take 1 tablet by mouth 2 (Two) Times a Day., Disp: , Rfl:     metoprolol tartrate (LOPRESSOR) 25 MG tablet, Take 1 tablet by mouth 2 (Two) Times a Day., Disp: 60 tablet, Rfl: 6    pantoprazole (PROTONIX) 40 MG EC tablet, Take 1 tablet by mouth 2 (two) times a day., Disp: , Rfl:     rosuvastatin (CRESTOR) 20 MG tablet, Take 1 tablet by mouth Every Night., Disp: , Rfl:       Allergies   Allergen Reactions    Maxzide [Hydrochlorothiazide W-Triamterene] Other (See Comments)     Kidney issues 06/30/20      Hydralazine Hcl Photosensitivity, Palpitations, GI Intolerance and Headache     Chest pains, headache, diarrhea    Hytrin [Terazosin] Palpitations     Visual issues- \"bright spots in eyes\" 6/2020    Nifedipine Er Rash     flushing         PHYSICAL EXAM:    /60 (BP Location: Right arm, Patient Position: Sitting, Cuff Size: Adult)   Pulse 70   Ht 167.6 cm (66\")   Wt 63.8 kg (140 lb 9.6 oz)   SpO2 98%   BMI 22.69 kg/m²        Wt Readings from Last 5 Encounters:   08/28/24 63.8 kg (140 lb 9.6 oz)   08/26/24 64.8 kg (142 lb 12.8 oz)   02/28/24 61.7 kg (136 lb)   12/20/23 61.2 kg (135 lb)   11/28/23 64.1 kg (141 lb 6.4 oz)       BP Readings from Last 5 Encounters:   08/28/24 152/60   08/26/24 128/56   02/28/24 138/68   12/21/23 150/60   11/30/23 128/54       General appearance - Alert, well appearing, and in no distress   Mental status - Affect appropriate to mood.  Eyes - Sclerae anicteric,  ENMT - Hearing grossly normal bilaterally, Dental hygiene good.  Neck - Carotids upstroke normal bilaterally, no bruits, no JVD.  Resp - Clear to auscultation, no wheezes, rales or rhonchi, symmetric air entry.  Heart - Normal rate, regular " rhythm, normal S1, S2, no murmurs, rubs, clicks or gallops.  GI - Soft, nontender, nondistended, no masses or organomegaly.  Neurological - Grossly intact - normal speech, no focal findings  Musculoskeletal - No joint tenderness, deformity or swelling, no muscular tenderness noted.  Extremities - Peripheral pulses normal, no pedal edema, no clubbing or cyanosis.  Skin - Normal coloration and turgor.  Psych -  oriented to person, place, and time.    Medical problems and test results were reviewed with the patient today.     No results found for this or any previous visit (from the past 672 hour(s)).      EKG: (EKG has been independently visualized by me and summarized below)    ECG 12 Lead    Date/Time: 8/28/2024 4:18 PM  Performed by: Kalia Caban PA    Authorized by: Kalia Caban PA  Comparison: compared with previous ECG from 2/28/2024  Similar to previous ECG  Rhythm: sinus rhythm  Rate: normal  Conduction: conduction normal  ST Segments: ST segments normal  QRS axis: normal    Clinical impression: non-specific ECG           Device Interrogation:  Please see device interrogation form which has been signed and updated for full details.  [employer] pacemaker.  A paced 97%.  RV paced less than 1%.  Normal P wave R wave thresholds and impedances.  Battery voltage is 8 years.  Less than 1% mode switch longest episode 1.5 hours.    ASSESSMENT   1.  Paroxysmal atrial fibrillation  Well mitigated with rare breakthrough episodes on Tikosyn therapy QTc stable    2.  Anticoagulation: [id number] score equal to 7 unfortunately she is not able to tolerate Eliquis well she is has recurrent GI bleeding.  She is a poor candidate to continue long-term anticoagulation therapy.    3.  [employer] pacemaker: Previous interrogation stable normal function normal thresholds battery voltage is 8 years.  1% mode switch.    PLAN  Long discussion with patient she clearly has an elevated [id number] and is  unable to tolerate Eliquis due to bleeding issues.  She is a poor long-term anticoagulation candidate.  We discussed option of pursuing a Watchman device.  We discussed the procedure benefits and risks.  She would like to schedule this in the near future.  Return follow-up as scheduled.        8/28/2024  14:42 EDT  Electronically signed by ARACELI Almaguer, 08/28/24, 4:21 PM EDT.

## 2024-08-29 ENCOUNTER — TELEPHONE (OUTPATIENT)
Dept: CARDIOLOGY | Facility: HOSPITAL | Age: 84
End: 2024-08-29
Payer: MEDICARE

## 2024-08-29 RX ORDER — DOFETILIDE 0.12 MG/1
125 CAPSULE ORAL EVERY 12 HOURS
Qty: 60 CAPSULE | Refills: 6 | Status: SHIPPED | OUTPATIENT
Start: 2024-08-29

## 2024-09-06 DIAGNOSIS — K92.2 GASTROINTESTINAL HEMORRHAGE, UNSPECIFIED GASTROINTESTINAL HEMORRHAGE TYPE: Primary | ICD-10-CM

## 2024-09-06 DIAGNOSIS — I48.0 PAROXYSMAL ATRIAL FIBRILLATION: ICD-10-CM

## 2024-09-12 PROBLEM — K92.2 GASTROINTESTINAL HEMORRHAGE: Status: ACTIVE | Noted: 2024-09-06

## 2024-09-13 ENCOUNTER — PREP FOR SURGERY (OUTPATIENT)
Dept: OTHER | Facility: HOSPITAL | Age: 84
End: 2024-09-13
Payer: MEDICARE

## 2024-09-13 DIAGNOSIS — I48.0 PAROXYSMAL ATRIAL FIBRILLATION: ICD-10-CM

## 2024-09-13 DIAGNOSIS — I48.92 PAROXYSMAL ATRIAL FLUTTER: Primary | ICD-10-CM

## 2024-09-13 RX ORDER — SODIUM CHLORIDE 0.9 % (FLUSH) 0.9 %
3 SYRINGE (ML) INJECTION EVERY 12 HOURS SCHEDULED
OUTPATIENT
Start: 2024-09-13

## 2024-09-13 RX ORDER — SODIUM CHLORIDE 9 MG/ML
40 INJECTION, SOLUTION INTRAVENOUS AS NEEDED
OUTPATIENT
Start: 2024-09-13

## 2024-09-13 RX ORDER — ACETAMINOPHEN 325 MG/1
650 TABLET ORAL EVERY 4 HOURS PRN
OUTPATIENT
Start: 2024-09-13

## 2024-09-13 RX ORDER — NITROGLYCERIN 0.4 MG/1
0.4 TABLET SUBLINGUAL
OUTPATIENT
Start: 2024-09-13

## 2024-09-13 RX ORDER — ONDANSETRON 2 MG/ML
4 INJECTION INTRAMUSCULAR; INTRAVENOUS EVERY 6 HOURS PRN
OUTPATIENT
Start: 2024-09-13

## 2024-09-13 RX ORDER — CEFAZOLIN SODIUM 2 G/100ML
2 INJECTION, SOLUTION INTRAVENOUS ONCE
OUTPATIENT
Start: 2024-09-13 | End: 2024-09-13

## 2024-09-13 RX ORDER — SODIUM CHLORIDE 0.9 % (FLUSH) 0.9 %
10 SYRINGE (ML) INJECTION AS NEEDED
OUTPATIENT
Start: 2024-09-13

## 2024-09-20 RX ORDER — METOPROLOL TARTRATE 25 MG/1
TABLET, FILM COATED ORAL
Qty: 60 TABLET | Refills: 6 | Status: SHIPPED | OUTPATIENT
Start: 2024-09-20

## 2024-09-27 NOTE — NURSING NOTE
PRE-LAAO HISTORY  Kelsey Avendano 1940   [address] [facility] [address]   [phone] (home)     Referring MD: Dg Martinez MD   Information obtained from: [x] Medical record review  [x] Patient report  Scheduled for: LAAO  implant on October 9, 2024 with Dr Dr. Breen  SDM Visit: Dg Martinez MD       CHADS-VASc Risk Assessment               7 Total Score    1 CHF    1 Hypertension    2 Age >/= 75    2 PRIOR STROKE/TIA/THROMBO    1 Sex: Female    Criteria that do not apply:    DM    Vascular Disease    Age 65-74             History & Risk Factors (prior to LAAO implant)  [id number] Risk Factors:  Congestive HF     [] No   [x] Yes-NYHA-I  LV Dysfunction   [] No   [x] Yes  Hypertension      [] No   [x] Yes  Diabetes    [x] No   [] Yes  Stroke       [x] No   [] Yes  TIA       [] No   [x] Yes  Thromboembolism    [x] No   [] Yes  Vascular Disease   [x] No   [] Yes       If Yes, Type   [] Prior MI  [] PAD      [] Aortic Plaque    Statin if indicated          [] No   [x] Yes- Crestor    HAS-BLED Risk Factors:  HTN (uncontrolled) [] No  [x] Yes  Abnormal Renal Fxn  [] No  [x] Yes  Abnormal Liver Fxn   [x] No  [] Yes  Stroke            [] No  [x] Yes  Bleeding event [] No  [x] Yes  Labile INR      [x] No  [] Yes  Alcohol  [x] No  [] Yes  Antiplatelets      [x] No  [] Yes  NSAIDS  [x] No  [] Yes    Additional Stroke & Bleeding Risk Factors:  Increased Fall Risk   [] No  [x] Yes  Bleeding Event         [] No  [x] Yes      If Yes, Type      [] Intracranial [] Epistaxis   [x] GI   [] Other    Rhythm History:  AFIBTYPE: paroxysmal    Valvular AFib        [x] No  [] Yes       If Yes, Rheum Valve Disease []  No  [] Yes       If Yes, Mitral Valve Replacement [] No  [] Yes            If Yes, Mechanical?   [] No  [] Yes       If Yes, Mitral Valve Repair  [] No  [] Yes    Attempt at AFib Termination:  [] No  [x] Yes       If Yes, pharmacologic CV    [] No  [x] Yes       If Yes, DC cardioversion  [x] No  [] Yes                  Atrial Flutter    [x] No  [] Yes       If Yes, attempt at termination  [] No  [] Yes            If Yes, pharmacologic cardioversion [] No  [] Yes            If Yes, DC cardioversion  [] No  [] Yes            If Yes, catheter ablation  [] No  [] Yes            If Yes, Date of most recent: ________           RUBIO Intervention    [x] No  [] Yes    Additional History & Risk Factors:  Cardiomyopathy   [x] No  [] Yes  Chronic Lung Disease  [x] No  [] Yes  Coronary Artery Disease  [x] No  [] Yes  Sleep Apnea    [x] No  [] Yes       If Yes, compliant with treatment [] No  [] Yes    Epicardial Approach Considered [x] No  [] Yes    Diagnostic Studies:  Last Echo:  [x] TTE Date: 7/18/23                  EF: 61-65%             LA: 4.9 cm             VHD- Mild mitral regurgitation with mildly dilated left atrium noted        Ace, arb arni for EF < 40%       Pre-procedure blood thinner medications:  Continue uninterrupted Eliquis on the morning of surgery start taking a daily 81 mg Aspirin.           09/27/24 09:19 EDT   [place] Revised 1/25/2024

## 2024-09-30 ENCOUNTER — TELEPHONE (OUTPATIENT)
Dept: INFUSION THERAPY | Facility: HOSPITAL | Age: 84
End: 2024-09-30
Payer: MEDICARE

## 2024-09-30 NOTE — TELEPHONE ENCOUNTER
Pt contacted as pre-procedure phone call prior to planned CTA chest with IVFs for 10/1/24. Reviewed with patient arrival time, location, nothing to eat or drink by mouth 2 hours prior to CT, okay to take morning medications the day of procedure with a small sip of water,  needed, reviewed procedure instructions and allowed time for questions, and reviewed home medications, allergies, and medical history.

## 2024-10-01 ENCOUNTER — HOSPITAL ENCOUNTER (OUTPATIENT)
Dept: CT IMAGING | Facility: HOSPITAL | Age: 84
Discharge: HOME | End: 2024-10-01
Payer: MEDICARE

## 2024-10-01 ENCOUNTER — PRE-ADMISSION TESTING (OUTPATIENT)
Dept: PREADMISSION TESTING | Facility: HOSPITAL | Age: 84
End: 2024-10-01
Payer: MEDICARE

## 2024-10-01 VITALS
HEIGHT: 66 IN | SYSTOLIC BLOOD PRESSURE: 135 MMHG | TEMPERATURE: 97 F | OXYGEN SATURATION: 95 % | RESPIRATION RATE: 16 BRPM | WEIGHT: 140 LBS | HEART RATE: 78 BPM | DIASTOLIC BLOOD PRESSURE: 78 MMHG | BODY MASS INDEX: 22.5 KG/M2

## 2024-10-01 DIAGNOSIS — I48.0 PAROXYSMAL ATRIAL FIBRILLATION: ICD-10-CM

## 2024-10-01 DIAGNOSIS — K92.2 GASTROINTESTINAL HEMORRHAGE, UNSPECIFIED GASTROINTESTINAL HEMORRHAGE TYPE: ICD-10-CM

## 2024-10-01 LAB
ANION GAP SERPL CALCULATED.3IONS-SCNC: 8 MMOL/L (ref 5–15)
BUN SERPL-MCNC: 24 MG/DL (ref 8–23)
BUN/CREAT SERPL: 26.1 (ref 7–25)
CALCIUM SPEC-SCNC: 9.4 MG/DL (ref 8.6–10.5)
CHLORIDE SERPL-SCNC: 106 MMOL/L (ref 98–107)
CO2 SERPL-SCNC: 29 MMOL/L (ref 22–29)
CREAT SERPL-MCNC: 0.92 MG/DL (ref 0.57–1)
DEPRECATED RDW RBC AUTO: 53.1 FL (ref 37–54)
EGFRCR SERPLBLD CKD-EPI 2021: 61.9 ML/MIN/1.73
ERYTHROCYTE [DISTWIDTH] IN BLOOD BY AUTOMATED COUNT: 15.8 % (ref 12.3–15.4)
GLUCOSE SERPL-MCNC: 89 MG/DL (ref 65–99)
HCT VFR BLD AUTO: 34.9 % (ref 34–46.6)
HGB BLD-MCNC: 10.7 G/DL (ref 12–15.9)
MCH RBC QN AUTO: 28.2 PG (ref 26.6–33)
MCHC RBC AUTO-ENTMCNC: 30.7 G/DL (ref 31.5–35.7)
MCV RBC AUTO: 92.1 FL (ref 79–97)
PLATELET # BLD AUTO: 154 10*3/MM3 (ref 140–450)
PMV BLD AUTO: 10.5 FL (ref 6–12)
POTASSIUM SERPL-SCNC: 4.7 MMOL/L (ref 3.5–5.2)
RBC # BLD AUTO: 3.79 10*6/MM3 (ref 3.77–5.28)
SODIUM SERPL-SCNC: 143 MMOL/L (ref 136–145)
WBC NRBC COR # BLD AUTO: 5.19 10*3/MM3 (ref 3.4–10.8)

## 2024-10-01 PROCEDURE — 80048 BASIC METABOLIC PNL TOTAL CA: CPT

## 2024-10-01 PROCEDURE — 25810000003 SODIUM CHLORIDE 0.9 % SOLUTION: Performed by: INTERNAL MEDICINE

## 2024-10-01 PROCEDURE — 25510000001 IOPAMIDOL PER 1 ML: Performed by: INTERNAL MEDICINE

## 2024-10-01 PROCEDURE — 36415 COLL VENOUS BLD VENIPUNCTURE: CPT

## 2024-10-01 PROCEDURE — 85027 COMPLETE CBC AUTOMATED: CPT

## 2024-10-01 PROCEDURE — 71275 CT ANGIOGRAPHY CHEST: CPT

## 2024-10-01 RX ORDER — SODIUM CHLORIDE 9 MG/ML
189 INJECTION, SOLUTION INTRAVENOUS ONCE
Status: COMPLETED | OUTPATIENT
Start: 2024-10-01 | End: 2024-10-01

## 2024-10-01 RX ORDER — SODIUM CHLORIDE 9 MG/ML
63 INJECTION, SOLUTION INTRAVENOUS CONTINUOUS
Status: DISCONTINUED | OUTPATIENT
Start: 2024-10-01 | End: 2024-10-02 | Stop reason: HOSPADM

## 2024-10-01 RX ORDER — IOPAMIDOL 755 MG/ML
80 INJECTION, SOLUTION INTRAVASCULAR
Status: COMPLETED | OUTPATIENT
Start: 2024-10-01 | End: 2024-10-01

## 2024-10-01 RX ADMIN — IOPAMIDOL 80 ML: 755 INJECTION, SOLUTION INTRAVENOUS at 10:37

## 2024-10-01 RX ADMIN — SODIUM CHLORIDE 189 ML/HR: 9 INJECTION, SOLUTION INTRAVENOUS at 09:27

## 2024-10-09 ENCOUNTER — HOSPITAL ENCOUNTER (INPATIENT)
Facility: HOSPITAL | Age: 84
LOS: 1 days | Discharge: HOME | DRG: 274 | End: 2024-10-09
Attending: INTERNAL MEDICINE | Admitting: INTERNAL MEDICINE
Payer: MEDICARE

## 2024-10-09 VITALS
TEMPERATURE: 99 F | BODY MASS INDEX: 22.56 KG/M2 | HEIGHT: 66 IN | RESPIRATION RATE: 12 BRPM | SYSTOLIC BLOOD PRESSURE: 139 MMHG | DIASTOLIC BLOOD PRESSURE: 70 MMHG | HEART RATE: 71 BPM | WEIGHT: 140.4 LBS | OXYGEN SATURATION: 94 %

## 2024-10-09 DIAGNOSIS — I48.19 PERSISTENT ATRIAL FIBRILLATION: ICD-10-CM

## 2024-10-09 DIAGNOSIS — I48.0 PAROXYSMAL ATRIAL FIBRILLATION: ICD-10-CM

## 2024-10-09 DIAGNOSIS — K92.2 GASTROINTESTINAL HEMORRHAGE, UNSPECIFIED GASTROINTESTINAL HEMORRHAGE TYPE: ICD-10-CM

## 2024-10-09 DIAGNOSIS — I48.91 ATRIAL FIBRILLATION WITH RVR: Primary | ICD-10-CM

## 2024-10-09 LAB — ACT BLD: 403 SECONDS (ref 82–152)

## 2024-10-09 PROCEDURE — 25810000003 SODIUM CHLORIDE 0.9 % SOLUTION: Performed by: INTERNAL MEDICINE

## 2024-10-09 PROCEDURE — 85347 COAGULATION TIME ACTIVATED: CPT

## 2024-10-09 PROCEDURE — C1889 IMPLANT/INSERT DEVICE, NOC: HCPCS

## 2024-10-09 PROCEDURE — 25010000002 CEFAZOLIN PER 500 MG: Performed by: PHYSICIAN ASSISTANT

## 2024-10-09 PROCEDURE — 25010000002 PROTAMINE SULFATE PER 10 MG: Performed by: INTERNAL MEDICINE

## 2024-10-09 PROCEDURE — 25010000002 LIDOCAINE 1 % SOLUTION: Performed by: INTERNAL MEDICINE

## 2024-10-09 PROCEDURE — C1759 CATH, INTRA ECHOCARDIOGRAPHY: HCPCS | Performed by: INTERNAL MEDICINE

## 2024-10-09 PROCEDURE — 02L73DK OCCLUSION OF LEFT ATRIAL APPENDAGE WITH INTRALUMINAL DEVICE, PERCUTANEOUS APPROACH: ICD-10-PCS | Performed by: INTERNAL MEDICINE

## 2024-10-09 PROCEDURE — 33340 PERQ CLSR TCAT L ATR APNDGE: CPT | Performed by: INTERNAL MEDICINE

## 2024-10-09 PROCEDURE — C1889 IMPLANT/INSERT DEVICE, NOC: HCPCS | Performed by: INTERNAL MEDICINE

## 2024-10-09 PROCEDURE — 25010000002 ONDANSETRON PER 1 MG: Performed by: INTERNAL MEDICINE

## 2024-10-09 PROCEDURE — C1760 CLOSURE DEV, VASC: HCPCS | Performed by: INTERNAL MEDICINE

## 2024-10-09 PROCEDURE — 25010000002 BUPIVACAINE 0.5 % SOLUTION: Performed by: INTERNAL MEDICINE

## 2024-10-09 PROCEDURE — 93662 INTRACARDIAC ECG (ICE): CPT | Performed by: INTERNAL MEDICINE

## 2024-10-09 PROCEDURE — 94660 CPAP INITIATION&MGMT: CPT

## 2024-10-09 PROCEDURE — 25010000002 FENTANYL CITRATE (PF) 50 MCG/ML SOLUTION: Performed by: INTERNAL MEDICINE

## 2024-10-09 PROCEDURE — 99153 MOD SED SAME PHYS/QHP EA: CPT | Performed by: INTERNAL MEDICINE

## 2024-10-09 PROCEDURE — C1893 INTRO/SHEATH, FIXED,NON-PEEL: HCPCS | Performed by: INTERNAL MEDICINE

## 2024-10-09 PROCEDURE — 99152 MOD SED SAME PHYS/QHP 5/>YRS: CPT | Performed by: INTERNAL MEDICINE

## 2024-10-09 PROCEDURE — 25010000002 MIDAZOLAM PER 1 MG: Performed by: INTERNAL MEDICINE

## 2024-10-09 PROCEDURE — C1894 INTRO/SHEATH, NON-LASER: HCPCS | Performed by: INTERNAL MEDICINE

## 2024-10-09 PROCEDURE — C1769 GUIDE WIRE: HCPCS | Performed by: INTERNAL MEDICINE

## 2024-10-09 PROCEDURE — 25010000002 HEPARIN (PORCINE) PER 1000 UNITS: Performed by: INTERNAL MEDICINE

## 2024-10-09 PROCEDURE — 25510000001 IOPAMIDOL PER 1 ML: Performed by: INTERNAL MEDICINE

## 2024-10-09 DEVICE — LEFT ATRIAL APPENDAGE CLOSURE DEVICE WITH DELIVERY SYSTEM
Type: IMPLANTABLE DEVICE | Site: HEART | Status: FUNCTIONAL
Brand: WATCHMAN FLX™ PRO

## 2024-10-09 RX ORDER — MIDAZOLAM HYDROCHLORIDE 1 MG/ML
INJECTION INTRAMUSCULAR; INTRAVENOUS
Status: DISCONTINUED | OUTPATIENT
Start: 2024-10-09 | End: 2024-10-09 | Stop reason: HOSPADM

## 2024-10-09 RX ORDER — IOPAMIDOL 755 MG/ML
INJECTION, SOLUTION INTRAVASCULAR
Status: DISCONTINUED | OUTPATIENT
Start: 2024-10-09 | End: 2024-10-09 | Stop reason: HOSPADM

## 2024-10-09 RX ORDER — PROTAMINE SULFATE 10 MG/ML
INJECTION, SOLUTION INTRAVENOUS
Status: DISCONTINUED | OUTPATIENT
Start: 2024-10-09 | End: 2024-10-09 | Stop reason: HOSPADM

## 2024-10-09 RX ORDER — ONDANSETRON 2 MG/ML
INJECTION INTRAMUSCULAR; INTRAVENOUS
Status: DISCONTINUED | OUTPATIENT
Start: 2024-10-09 | End: 2024-10-09 | Stop reason: HOSPADM

## 2024-10-09 RX ORDER — LIDOCAINE HYDROCHLORIDE 10 MG/ML
INJECTION, SOLUTION INFILTRATION; PERINEURAL
Status: DISCONTINUED | OUTPATIENT
Start: 2024-10-09 | End: 2024-10-09 | Stop reason: HOSPADM

## 2024-10-09 RX ORDER — SODIUM CHLORIDE 0.9 % (FLUSH) 0.9 %
3 SYRINGE (ML) INJECTION EVERY 12 HOURS SCHEDULED
Status: DISCONTINUED | OUTPATIENT
Start: 2024-10-09 | End: 2024-10-09 | Stop reason: HOSPADM

## 2024-10-09 RX ORDER — BUPIVACAINE HYDROCHLORIDE 5 MG/ML
INJECTION, SOLUTION PERINEURAL
Status: DISCONTINUED | OUTPATIENT
Start: 2024-10-09 | End: 2024-10-09 | Stop reason: HOSPADM

## 2024-10-09 RX ORDER — SODIUM CHLORIDE 9 MG/ML
INJECTION, SOLUTION INTRAVENOUS
Status: COMPLETED | OUTPATIENT
Start: 2024-10-09 | End: 2024-10-09

## 2024-10-09 RX ORDER — NITROGLYCERIN 0.4 MG/1
0.4 TABLET SUBLINGUAL
Status: DISCONTINUED | OUTPATIENT
Start: 2024-10-09 | End: 2024-10-09 | Stop reason: HOSPADM

## 2024-10-09 RX ORDER — SODIUM CHLORIDE 0.9 % (FLUSH) 0.9 %
10 SYRINGE (ML) INJECTION AS NEEDED
Status: DISCONTINUED | OUTPATIENT
Start: 2024-10-09 | End: 2024-10-09 | Stop reason: HOSPADM

## 2024-10-09 RX ORDER — HEPARIN SODIUM 1000 [USP'U]/ML
INJECTION, SOLUTION INTRAVENOUS; SUBCUTANEOUS
Status: DISCONTINUED | OUTPATIENT
Start: 2024-10-09 | End: 2024-10-09 | Stop reason: HOSPADM

## 2024-10-09 RX ORDER — ASPIRIN 325 MG
325 TABLET, DELAYED RELEASE (ENTERIC COATED) ORAL DAILY
Status: DISCONTINUED | OUTPATIENT
Start: 2024-10-09 | End: 2024-10-09 | Stop reason: HOSPADM

## 2024-10-09 RX ORDER — ACETAMINOPHEN 325 MG/1
650 TABLET ORAL EVERY 4 HOURS PRN
Status: DISCONTINUED | OUTPATIENT
Start: 2024-10-09 | End: 2024-10-09 | Stop reason: HOSPADM

## 2024-10-09 RX ORDER — ONDANSETRON 2 MG/ML
4 INJECTION INTRAMUSCULAR; INTRAVENOUS EVERY 6 HOURS PRN
Status: DISCONTINUED | OUTPATIENT
Start: 2024-10-09 | End: 2024-10-09 | Stop reason: HOSPADM

## 2024-10-09 RX ORDER — SODIUM CHLORIDE 9 MG/ML
40 INJECTION, SOLUTION INTRAVENOUS AS NEEDED
Status: DISCONTINUED | OUTPATIENT
Start: 2024-10-09 | End: 2024-10-09 | Stop reason: HOSPADM

## 2024-10-09 RX ORDER — FENTANYL CITRATE 50 UG/ML
INJECTION, SOLUTION INTRAMUSCULAR; INTRAVENOUS
Status: DISCONTINUED | OUTPATIENT
Start: 2024-10-09 | End: 2024-10-09 | Stop reason: HOSPADM

## 2024-10-09 RX ADMIN — SODIUM CHLORIDE 2 G: 900 INJECTION INTRAVENOUS at 10:03

## 2024-10-09 NOTE — OP NOTE
" LEFT ATRIAL APPENDAGE CLOSURE (LAAC)                                 Watchman Implant    PROCEDURES PERFORMED:    Left atrial appendage closure with 27 mm Watchman FLX Pro device with intracardiac echocardiography.  Patient was admitted to the hospital for this procedure.  This is an inpatient procedure.    PREPROCEDURAL DIAGNOSES:    1.  Paroxysmal atrial fibrillation  2. [id number] score of 7  3. HASBLED score of 5    REFERRING PHYSICIAN:    Dg Martinez MD    POST PROCEDURE DIANGOSES:  As above.    INDICATION FOR PROCEDURE:  Briefly, Kelsey Avendano is a 84 y.o. year old female with a history of paroxysmal atrial fibrillation significant risk for cardioembolic stroke prior history of cardioembolic stroke significant difficulty with recurrent GI bleeding due to AV malformation.  She presents today for elective inpatient watchman implantation.    ANTICOAGULATION STRATEGY PRIOR TO AND POST PROCEDURE: DOAC.  The last dose of anticoagulant was confirmed to have been taken this morning.    PT/INR:  No results found for: \"LABPROT\", \"INR\"  PTT:  No results found for: \"APTT\"    CBC: No results found for: \"WBC\", \"RBC\", \"HGB\", \"HCT\", \"MCV\", \"RDW\", \"PLT\"  BMP:   No results found for: \"NA\", \"K\", \"CL\", \"CO2\", \"BUN\", \"CREATININE\", \"LABCREA\", \"EGFR\", \"GLU\", \"LABGLUC\"    Vital Signs: /57   Pulse 70   Temp 99 °F (37.2 °C) (Temporal)   Resp 12   Ht 167.6 cm (66\")   Wt 63.7 kg (140 lb 6.4 oz)   SpO2 99%   BMI 22.66 kg/m²  O2 Flow Rate (L/min): 3 L/min   Admit Weight:  63.7 kg (140 lb 6.4 oz)  BMI: Body mass index is 22.66 kg/m².    MODERATE SEDATION FOR PROCEDURE:    Moderate sedation was given during this procedure.    I supervised and directed RN to administer this sedation.  This staff member also monitored the patient's hemodynamic and respiratory status and response to these medications.  Please see the full detailed procedure report generated by the electrophysiology laboratory staff.  The patient tolerated " moderate sedation well.  There were no complications regarding sedation.  The total dose of fentanyl was 100 mcg and the total dose of midazolam was 4 mg.  The total dose of Brevital was 0 mg.  First sedation was administered at 10 AM and continued through 1031.    PROCEDURE NARRATIVE:    The patient was able to give written informed consent after revisiting the key portions of the risk versus benefit profile of the procedure.  This discussion was framed by our lengthy conversations  (please see our detailed notes).  Patient verbalized strong understanding of this discussion and a strong desire to proceed with the procedure.  Please note that this detailed informed consent process utilized mutual and shared decision making process between all parties involved, principally the physician and patient, but also potentially with input from the patient's selected family and friends.    Please also note that the patient was seen and examined prior to this procedure by a non implanting physician who agreed that this patient would benefit from left atrial appendage closure as an alternative to long-term anticoagulation.  Please see this physician separate attestation.    The patient was brought to the EP Lab in the post absorptive state.      The patient was then prepared and draped in a routing sterile fashion.  Seldinger access was obtained at the right common femoral vein with a dual venipunctures utilizing ultrasound probe guided vascular access.  A J tip wire was advanced into the vascular space.  A pair of sheaths were placed into the inferior vena cava.  The patient was anticoagulated with unfractionated heparin in bolus and then continuous infusion to maintain an ACT of between 200 and 300 seconds.    A phased array intracardiac echocardiography probe was placed into the right atrium, right ventricle and after transseptal puncture also into the left atrium and left ventricle.  This was used for visualization of  critical cardiac structures such as the fossa ovalis interatrial septum left atrial appendage left superior pulmonary vein mitral valve annulus and pericardial space.  This aspect of the procedure is a separate and independent part of the procedure which was critical for implantation of the Watchman.    We performed transseptal puncture with a combination of fluoroscopic and echocardiographic guidance.    The Watchman delivery sheath was then placed in the left atrium under echocardiographic and fluoroscopic guidance safely.  Left atrial appendage was then cannulated with a 10 mm angled pigtail catheter.  This was again performed with a combination of echocardiographic and fluoroscopic guidance.  Contrast injection was also performed.  The Watchman delivery sheath was then placed into optimal position within left atrial appendage.  The left atrial appendage diameter was viewed in multiple projections on echocardiography as well as fluoroscopy with contrast injection into the left atrial appendage.  A 27 mm device was selected and delivered with the supplied delivery tools to the left atrial appendage safely.      PASS criteria were then evaluated and confirmed.      The device was then safely released.    Catheters and sheaths were withdrawn from the left atrium and then the body.  Hemostasis at the site of venous access was achieved after withdrawing the sheath from the body with a vascade closure device  and manual pressure.  Intracardiac echocardiogram demonstrated no pericardial effusion.    Protamine was given to reverse anticoagulation.    The patient was transferred to recovery in stable condition.    COMPLICATIONS: None    EBL: minimal    KEY PROCEDURAL FINDINGS:    27 mm Watchman FLX Pro implanted with all trabeculae covered and no anais-device leak    POST PROCEDURAL PLAN:    Uninterrupted anticoagulation for not less than 45 days unless specially instructed otherwise by myself or another member of our EP  physician team. The patient will also be taking 81 mg of aspirin.  An imaging ( JEANETTE or CT scan ) will be obtained to assess for appropriate closure of the left atrial appendage occlusion device at 45 days post implant.    Please note that this patient was admitted specifically for this elective inpatient procedure.  This is an inpatient procedure.  Due to COVID-19 global pandemic and bed shortage we will endeavor to discharge this patient later today rather than tomorrow.  I think that he will tolerate this well.  We will of course wait until later in the afternoon to make a final decision.  The patient will be seen at our office per protocol for this procedure.    Jacek Breen DO, FACC, RS  Cardiac Electrophysiologist  [facility] / [facility]

## 2024-10-09 NOTE — Clinical Note
Replaced previous sheath in the right femoral vein. RFV 9FR SHORT SHEATH EXCHANGED FOR [employer] SHEATH OVER WIRE

## 2024-10-09 NOTE — NURSING NOTE
LAAO Procedure Information   Kelsey Avendano 1940   [address] [facility] [address]   [phone] (home)       RUBIO Orifice Maximal Width: 17 mm  Cumulative Air Kerma:  145 mGy  Contrast Volume: 40  mL  Dose Area Product: 1023.50  ?Gy-M2

## 2024-10-09 NOTE — H&P
Pre-cardiac Electrophysiology Procedure history and Physical  [facility]      Patient:  Kelsey Avendano  :  1940  MRN: [id number]    PCP:  Sudhir Arita MD  PHONE:  [phone]    DATE: 10/9/2024  ID: Kelsey Avendano is a 84 y.o. female from [facility]    CC: GI bleeding    Cardiac PMH: (Old records have been reviewed and summarized below)  TIA; on aspirin (not daily); right sided visual deficit:  MRI, 2018: Age-appropriate diffuse generalized cerebral atrophy with gliosis in the white matter and ill defined oat infarct in the cerebellum on the left with no acute areas of ischemia  MRA, 2018: Neck, slightly tortuous vessels consistent with atherosclerotic changes and no focal areas of stenosis. Antegrade flow noted bilaterally.  Echocardiogram, 2018: EF 60-65%, grade 2 diastolic dysfunction with mildly dilated right atrial cavity is moderate pulmonary hypertension.  Holter 3/2018: Normal sinus rhythm/sinus bradycardia at 58 bpm.  JEANETTE, 2018: EF 60%. Moderate AI, aortic valve leaflets appear rheumatic. Mild-to-moderate MR/TR. Most likely source of patient's TIA is aortic atheroma.  Paroxysmal atrial fibrillation/flutter.  Tachybradycardia syndrome  5/10/2022 echo EF 61 to 65%.  Right atrium moderately dilated, left atrium moderately volume increased.  Moderate MR, moderate TR.  RVSP 35 to 45 mmHg.  Mild LVH.  Treated at [facility] with IV diltiazem, eventually discharged on NOAC with plan for outpatient external cardioversion.  2022 return for revisit in sinus bradycardia.  Digoxin discontinued secondary to CKD and elevated serum level.  [facility] ED presentation  with EKG revealing Atrial Fibrillation. Converted to NSR without intervention.  [facility] ED presentation  with palpitations- EKG revealing NSR.  Sinus rhythm maintained on amiodarone however symptomatic bradycardia.  Pacemaker implant,  "11/28/2023: Successful implantation of a dual-chamber permanent pacemaker.   [id number] score equal to 7  Aortic insufficiency:  JEANETTE, 11/06/2018: EF 60%. Moderate AI, aortic valve leaflets appear rheumatic. Mild-to-moderate MR/TR.   Echocardiogram, 09/17/2019: EF 60%. Mild-to-mod AI. Trace-to-mild MR. Mild TR.  Chronic diastolic heart failure  [facility] in 2008: Reportedly normal, data insufficient  Hypertension.  Hyperlipidemia.  CKD  Hx CVA.  GERD.  Lupus.  Iron deficiency anemia  AVM cauterization per patient 2024  Anxiety/depression  Surgeries:  Partial replacement bilateral knees  Vaginal surgery  Breast biopsy  Total hysterectomy  Knee replacement    BRIEF HPI:   Ms. Avendano is an 84-year-old female with the above medical history who presents for watchman left atrial appendage occlusion device implantation.  She has had recurrent GI bleeding and paroxysmal atrial fibrillation.  Since we last saw the patient in office in late August, she reports no significant changes.        Allergies:      Allergies   Allergen Reactions    Maxzide [Hydrochlorothiazide W-Triamterene] Other (See Comments)     Kidney issues 06/30/20      Hydralazine Hcl Photosensitivity, Palpitations, GI Intolerance and Headache     Chest pains, headache, diarrhea    Hytrin [Terazosin] Palpitations     Visual issues- \"bright spots in eyes\" 6/2020    Nifedipine Er Rash     flushing       MEDICATIONS:  Current Outpatient Medications   Medication Instructions    acetaminophen (TYLENOL) 650 mg, Oral, As Needed    amLODIPine (NORVASC) 5 mg, Oral, 2 Times Daily    apixaban (ELIQUIS) 5 mg, Oral, 2 Times Daily    APPLE CIDER VINEGAR PO Oral, Daily, 2 gummies     cetirizine (ZYRTEC) 10 mg, Oral, Daily    dofetilide (TIKOSYN) 125 mcg, Oral, Every 12 Hours    escitalopram (LEXAPRO) 20 mg, Oral, Nightly    ferrous gluconate 324 mg, Oral, Daily    furosemide (LASIX) 10 mg, Oral, Nightly PRN, 1/2 tablet prn     hydroxychloroquine (PLAQUENIL) 200 mg, Oral, 2 Times " "Daily    metoprolol tartrate (LOPRESSOR) 25 MG tablet TAKE 1 TABLET BY MOUTH 2 (TWO) TIMES A DAY FOR HEART    pantoprazole (PROTONIX) 40 mg, Oral, 2 times daily    rosuvastatin (CRESTOR) 20 mg, Oral, Nightly       Past medical & surgical history, social and family history reviewed in the electronic medical record.    ROS: Pertinent positives listed in the HPI and problem list above. All others reviewed and negative.     Physical Exam:   /88 (BP Location: Right arm, Patient Position: Lying)   Pulse 71   Temp 99 °F (37.2 °C) (Temporal)   Resp 16   Ht 167.6 cm (66\")   Wt 63.7 kg (140 lb 6.4 oz)   SpO2 97%   BMI 22.66 kg/m²     Constitutional:    Well-appearing 84 y.o. y/o adult  in no acute distress        Heart:    Regular rhythm and normal rate, 1/6 systolic murmur   Lungs:     Clear to auscultation bilaterally, no wheezes, rhales or rhonchi, nonlabored respirations   Abdomen:     Soft, nontender   Extremities:   No gross deformities, no edema, clubbing, or cyanosis.    Pulses:    Neuro:  Psych:   Radial and dorsalis pedis pulses palpable and equal bilaterally.  No gross focal deficits  Mood and behavior appropriate for situation       Labs and Diagnostic Data:  Lab Results   Component Value Date    GLUCOSE 89 10/01/2024    BUN 24 (H) 10/01/2024    CREATININE 0.92 10/01/2024     10/01/2024    K 4.7 10/01/2024     10/01/2024    CALCIUM 9.4 10/01/2024    PROTEINTOT 7.3 12/20/2023    ALBUMIN 4.2 12/20/2023    ALT 27 12/20/2023    AST 55 (H) 12/20/2023    ALKPHOS 83 12/20/2023    BILITOT 0.3 12/20/2023    GLOB 3.1 12/20/2023    AGRATIO 1.4 12/20/2023    BCR 26.1 (H) 10/01/2024    ANIONGAP 8.0 10/01/2024    EGFR 61.9 10/01/2024     Lab Results   Component Value Date    WBC 5.19 10/01/2024    HGB 10.7 (L) 10/01/2024    HCT 34.9 10/01/2024    MCV 92.1 10/01/2024     10/01/2024     Lab Results   Component Value Date    TSH 5.180 (H) 08/15/2023     Lab Results   Component Value Date    CHOL 106 " 05/10/2022    TRIG 104 05/10/2022    HDL 48 05/10/2022    LDL 39 05/10/2022     Lab Results   Component Value Date    HGBA1C 5.00 11/20/2023       Tele: Atrial paced rhythm    IMPRESSION:  Ms. Avendano is an 84-year-old female with a history of persistent atrial fibrillation well-controlled on Tikosyn who presents for watchman left atrial appendage occlusion device implantation.  She is an acceptable short-term candidate for anticoagulation but poor long-term candidate for anticoagulation due to recurrent GI bleeding.  Pt confirms no missed doses of Eliquis over last 4 weeks    PLAN:  Procedure to perform: LAAO device, watchman. Risks, benefits and alternatives to the procedure explained to the patient and she understands and wishes to proceed.     Electronically signed by Jaime Mcdaniel PA-C, 10/09/24, 7:41 AM EDT.

## 2024-10-10 ENCOUNTER — CALL CENTER PROGRAMS (OUTPATIENT)
Dept: CALL CENTER | Facility: HOSPITAL | Age: 84
End: 2024-10-10
Payer: MEDICARE

## 2024-10-10 NOTE — OUTREACH NOTE
PCI/Device Survey      Flowsheet Row Responses   Facility patient discharged from? [facility]   Procedure date 10/09/24   Procedure (if device, specify in description) Device   Device Description Left atrial appendage closure with 27 mm Watchman FLX Pro device--Right femoral   Performing MD Dr. Jacek Breen   Attempt successful? No   Unsuccessful attempts Attempt 2            KATHY GEIGER - Registered Nurse

## 2024-10-10 NOTE — OUTREACH NOTE
PCI/Device Survey      Flowsheet Row Responses   Facility patient discharged from? [facility]   Procedure date 10/09/24   Procedure (if device, specify in description) Device   Device Description Left atrial appendage closure with 27 mm Watchman FLX Pro device--Right femoral   Performing MD Dr. Jacek Breen   Attempt successful? No   Unsuccessful attempts Attempt 1  [attempted home, cell and both son's  numbers--reached Nito who reports that pt was at Gerardo's home,  will attempt again later]            KATHY GEIGER - Registered Nurse

## 2024-10-17 ENCOUNTER — TELEPHONE (OUTPATIENT)
Dept: CARDIOLOGY | Facility: CLINIC | Age: 84
End: 2024-10-17
Payer: MEDICARE

## 2024-10-17 NOTE — TELEPHONE ENCOUNTER
Pt called stating since her watchman was placed she has been having more episodes of a fib. She notices the symptoms only at night and has had palpitations and mild SOB. She states she has also had increased hemorrhoidal bleeding post procedure but today hasn't noticed any. This morning she has felt a bit better. She will send through a transmission on her pacemaker when she returns home later this afternoon to see how much a fib she is having and her rates. She will also follow up with her PCP to check her hemoglobin if she begins to have additional bleeding. I advised her she cannot stop the medication she is on at this time but to notify us if her PCP gets a low hemoglobin result. Pt verbalized understanding.

## 2024-10-23 RX ORDER — AMLODIPINE BESYLATE 5 MG/1
TABLET ORAL
Qty: 60 TABLET | Refills: 6 | Status: SHIPPED | OUTPATIENT
Start: 2024-10-23

## 2024-11-12 ENCOUNTER — HOSPITAL ENCOUNTER (OUTPATIENT)
Dept: CARDIOLOGY | Facility: HOSPITAL | Age: 84
Discharge: HOME | End: 2024-11-12
Admitting: INTERNAL MEDICINE
Payer: MEDICARE

## 2024-11-12 VITALS
RESPIRATION RATE: 16 BRPM | DIASTOLIC BLOOD PRESSURE: 64 MMHG | OXYGEN SATURATION: 94 % | HEART RATE: 70 BPM | SYSTOLIC BLOOD PRESSURE: 122 MMHG

## 2024-11-12 DIAGNOSIS — I48.19 PERSISTENT ATRIAL FIBRILLATION: ICD-10-CM

## 2024-11-12 DIAGNOSIS — I48.91 ATRIAL FIBRILLATION WITH RVR: ICD-10-CM

## 2024-11-12 LAB
BH CV ECHO MEAS - RAP SYSTOLE: 5 MMHG
BH CV ECHO MEAS - RVSP: 37 MMHG
BH CV ECHO MEAS - TR MAX PG: 32 MMHG
BH CV ECHO MEAS - TR MAX VEL: 284 CM/SEC

## 2024-11-12 PROCEDURE — 99152 MOD SED SAME PHYS/QHP 5/>YRS: CPT | Performed by: INTERNAL MEDICINE

## 2024-11-12 PROCEDURE — 93325 DOPPLER ECHO COLOR FLOW MAPG: CPT

## 2024-11-12 PROCEDURE — 93321 DOPPLER ECHO F-UP/LMTD STD: CPT

## 2024-11-12 PROCEDURE — 76376 3D RENDER W/INTRP POSTPROCES: CPT | Performed by: INTERNAL MEDICINE

## 2024-11-12 PROCEDURE — 25010000002 FENTANYL CITRATE (PF) 50 MCG/ML SOLUTION: Performed by: INTERNAL MEDICINE

## 2024-11-12 PROCEDURE — S0260 H&P FOR SURGERY: HCPCS | Performed by: PHYSICIAN ASSISTANT

## 2024-11-12 PROCEDURE — 99152 MOD SED SAME PHYS/QHP 5/>YRS: CPT

## 2024-11-12 PROCEDURE — 93312 ECHO TRANSESOPHAGEAL: CPT | Performed by: INTERNAL MEDICINE

## 2024-11-12 PROCEDURE — 93320 DOPPLER ECHO COMPLETE: CPT | Performed by: INTERNAL MEDICINE

## 2024-11-12 PROCEDURE — 76376 3D RENDER W/INTRP POSTPROCES: CPT

## 2024-11-12 PROCEDURE — 93325 DOPPLER ECHO COLOR FLOW MAPG: CPT | Performed by: INTERNAL MEDICINE

## 2024-11-12 PROCEDURE — 93312 ECHO TRANSESOPHAGEAL: CPT

## 2024-11-12 PROCEDURE — 25010000002 MIDAZOLAM PER 1 MG: Performed by: INTERNAL MEDICINE

## 2024-11-12 PROCEDURE — 93320 DOPPLER ECHO COMPLETE: CPT

## 2024-11-12 RX ORDER — ASPIRIN 81 MG/1
81 TABLET ORAL DAILY
Start: 2024-11-12

## 2024-11-12 RX ORDER — FENTANYL CITRATE 50 UG/ML
INJECTION, SOLUTION INTRAMUSCULAR; INTRAVENOUS
Status: COMPLETED | OUTPATIENT
Start: 2024-11-12 | End: 2024-11-12

## 2024-11-12 RX ORDER — CLOPIDOGREL BISULFATE 75 MG/1
75 TABLET ORAL DAILY
Qty: 147 TABLET | Refills: 0 | Status: SHIPPED | OUTPATIENT
Start: 2024-11-13 | End: 2025-04-09

## 2024-11-12 RX ORDER — MIDAZOLAM HYDROCHLORIDE 1 MG/ML
INJECTION, SOLUTION INTRAMUSCULAR; INTRAVENOUS
Status: COMPLETED | OUTPATIENT
Start: 2024-11-12 | End: 2024-11-12

## 2024-11-12 RX ADMIN — FENTANYL CITRATE 25 MCG: 50 INJECTION, SOLUTION INTRAMUSCULAR; INTRAVENOUS at 09:03

## 2024-11-12 RX ADMIN — MIDAZOLAM 1 MG: 1 INJECTION INTRAMUSCULAR; INTRAVENOUS at 09:09

## 2024-11-12 RX ADMIN — MIDAZOLAM 1 MG: 1 INJECTION INTRAMUSCULAR; INTRAVENOUS at 09:03

## 2024-11-12 RX ADMIN — FENTANYL CITRATE 25 MCG: 50 INJECTION, SOLUTION INTRAMUSCULAR; INTRAVENOUS at 09:06

## 2024-11-12 RX ADMIN — MIDAZOLAM 1 MG: 1 INJECTION INTRAMUSCULAR; INTRAVENOUS at 09:06

## 2024-11-12 RX ADMIN — FENTANYL CITRATE 25 MCG: 50 INJECTION, SOLUTION INTRAMUSCULAR; INTRAVENOUS at 09:09

## 2024-11-12 NOTE — H&P
[facility], Preprocedure H&P  Date of Hospital Visit: 24  Encounter Provider: Moon Edmondson PA-C    [facility] of [facility]  Patient Name: Kelsey Avendano  :1940  MRN: [id number]     Primary Care Provider: Sudhir Arita MD    Chief complaint: 45-day watchman device follow-up    PROBLEM LIST  TIA; on aspirin (not daily); right sided visual deficit:  MRI, 2018: Age-appropriate diffuse generalized cerebral atrophy with gliosis in the white matter and ill defined oat infarct in the cerebellum on the left with no acute areas of ischemia  MRA, 2018: Neck, slightly tortuous vessels consistent with atherosclerotic changes and no focal areas of stenosis. Antegrade flow noted bilaterally.  Echocardiogram, 2018: EF 60-65%, grade 2 diastolic dysfunction with mildly dilated right atrial cavity is moderate pulmonary hypertension.  Holter 3/2018: Normal sinus rhythm/sinus bradycardia at 58 bpm.  JEANETTE, 2018: EF 60%. Moderate AI, aortic valve leaflets appear rheumatic. Mild-to-moderate MR/TR. Most likely source of patient's TIA is aortic atheroma.  Paroxysmal atrial fibrillation/flutter.  Tachybradycardia syndrome  5/10/2022 echo EF 61 to 65%.  Right atrium moderately dilated, left atrium moderately volume increased.  Moderate MR, moderate TR.  RVSP 35 to 45 mmHg.  Mild LVH.  Treated at [facility] with IV diltiazem, eventually discharged on NOAC with plan for outpatient external cardioversion.  2022 return for revisit in sinus bradycardia.  Digoxin discontinued secondary to CKD and elevated serum level.  [facility] ED presentation  with EKG revealing Atrial Fibrillation. Converted to NSR without intervention.  [facility] ED presentation  with palpitations- EKG revealing NSR.  Sinus rhythm maintained on amiodarone however symptomatic bradycardia.  Pacemaker implant, 2023: Successful implantation of a dual-chamber  permanent pacemaker.   10/9/2024: 27 mm Watchman FLX pro device implantation  [id number] score equal to 7  Aortic insufficiency:  JEANETTE, 11/06/2018: EF 60%. Moderate AI, aortic valve leaflets appear rheumatic. Mild-to-moderate MR/TR.   Echocardiogram, 09/17/2019: EF 60%. Mild-to-mod AI. Trace-to-mild MR. Mild TR.  Chronic diastolic heart failure  [facility] in 2008: Reportedly normal, data insufficient  Hypertension.  Hyperlipidemia.  CKD  Hx CVA.  GERD.  Lupus.  Iron deficiency anemia  AVM cauterization per patient 2024  Anxiety/depression  Surgeries:  Partial replacement bilateral knees  Vaginal surgery  Breast biopsy  Total hysterectomy  Knee replacement  History of Present Illness:  Patient is an 84-year-old with the above-noted history presents today for 45-day follow-up with a JEANETTE after a 27 mm Watchman FLX pro device was placed on 10/9/2024 by Dr. Breen.  After placement of device patient complained of palpitations, a remote transmission of her device was sent in showing 1 episode of ATR.  Her A-fib burden was less than 1%.Patient has been taking her Eliquis as prescribed.           I reviewed patient's past medical history, surgical history, family history and social history.    Current Outpatient Medications   Medication Instructions    acetaminophen (TYLENOL) 650 mg, Oral, As Needed    amLODIPine (NORVASC) 5 MG tablet TAKE 1 TABLET BY MOUTH 2 TIMES DAILY BLOOD PRESSURE    apixaban (ELIQUIS) 5 mg, 2 Times Daily    APPLE CIDER VINEGAR PO Oral, Daily, 2 gummies     cetirizine (ZYRTEC) 10 mg, Oral, Daily    dofetilide (TIKOSYN) 125 mcg, Oral, Every 12 Hours    escitalopram (LEXAPRO) 20 mg, Oral, Nightly    ferrous gluconate 324 mg, Oral, Daily    furosemide (LASIX) 10 mg, Oral, Nightly PRN, 1/2 tablet prn     hydroxychloroquine (PLAQUENIL) 200 mg, Oral, 2 Times Daily    metoprolol tartrate (LOPRESSOR) 25 MG tablet TAKE 1 TABLET BY MOUTH 2 (TWO) TIMES A DAY FOR HEART    pantoprazole (PROTONIX) 40 mg, Oral, 2 times  daily    rosuvastatin (CRESTOR) 20 mg, Nightly          Scheduled Meds:  Continuous Infusions:No current facility-administered medications for this encounter.        Review of Systems            Objective:     Vitals:    11/12/24 0844   BP: 136/70   Pulse: 71   Resp: 18   SpO2: 96%       There is no height or weight on file to calculate BMI.    No intake or output data in the 24 hours ending 11/12/24 0854    Vitals reviewed.   Constitutional:       Appearance: Healthy appearance. Not in distress.   HENT:    Mouth/Throat:      Pharynx: Oropharynx is clear.   Neck:      Vascular: No carotid bruit or JVD.   Pulmonary:      Effort: Pulmonary effort is normal.      Breath sounds: No decreased breath sounds. No wheezing. No rhonchi.   Cardiovascular:      Normal rate. Irregularly irregular rhythm.      Murmurs: There is no murmur.      No rub.   Pulses:     Intact distal pulses.   Edema:     Peripheral edema absent.   Abdominal:      General: There is no distension.      Palpations: Abdomen is soft.      Tenderness: There is no abdominal tenderness.   Musculoskeletal:         General: No deformity. Skin:     General: Skin is warm and dry.   Neurological:      General: No focal deficit present.      Mental Status: Alert and oriented to person, place and time.           Results for orders placed during the hospital encounter of 07/18/23    Adult Transthoracic Echo Complete W/ Cont if Necessary Per Protocol    Interpretation Summary    Left ventricular cavity size is normal with mild concentric left ventricular hypertrophy.    Left ventricular systolic function is normal. Left ventricular ejection fraction appears to be 61 - 65%.    Left ventricular diastolic function was normal.    Moderate aortic valve regurgitation is present.  No evidence of aortic stenosis detected.    Mild mitral regurgitation with mildly dilated left atrium noted.    Mild tricuspid regurgitation with mild pulmonary hypertension noted.    No  pericardial effusion detected.                  Assessment:   Paroxysmal atrial fibrillation/flutter  Watchman device implantation      Plan:   Proceed with transesophageal echocardiogram for 45-day follow-up of her 27 mm Watchman FLX device.  Risk and benefits discussed with the patient and she agrees to proceed.  Further recommendations to be made postprocedure by Dr. Frost.      Moon Edmondson PA-C

## 2025-02-17 ENCOUNTER — TELEPHONE (OUTPATIENT)
Dept: CARDIOLOGY | Facility: CLINIC | Age: 85
End: 2025-02-17

## 2025-02-17 NOTE — TELEPHONE ENCOUNTER
"    Name: Kelsey Avendano E \"Pat\"    Relationship: Self    Best Callback Number: [phone]     Incoming call to the Hub, requesting to  Reschedule their Other: BSC 3 MO FU   appointment on  02/17/25.     Per [facility] workflow, further review is needed before the task can be completed.    Result of Call: Transferred to [facility] at the practice      "

## 2025-02-27 NOTE — PHARMACY PATIENT ASSISTANCE
Pharmacy was consulted on the cost of DOACs. Per patient's insurance, either Eliquis or Xarelto would be $30 and per patient assistance technician, pt is ok with cost.    Thank you,  Moon Herrera, PharmD     Negative

## 2025-03-10 ENCOUNTER — OFFICE VISIT (OUTPATIENT)
Dept: CARDIOLOGY | Facility: CLINIC | Age: 85
End: 2025-03-10
Payer: MEDICARE

## 2025-03-10 VITALS
DIASTOLIC BLOOD PRESSURE: 68 MMHG | OXYGEN SATURATION: 93 % | WEIGHT: 143 LBS | SYSTOLIC BLOOD PRESSURE: 140 MMHG | HEIGHT: 65 IN | HEART RATE: 70 BPM | BODY MASS INDEX: 23.82 KG/M2

## 2025-03-10 VITALS
OXYGEN SATURATION: 98 % | SYSTOLIC BLOOD PRESSURE: 128 MMHG | DIASTOLIC BLOOD PRESSURE: 56 MMHG | HEIGHT: 65 IN | WEIGHT: 142.6 LBS | BODY MASS INDEX: 23.76 KG/M2 | HEART RATE: 70 BPM

## 2025-03-10 DIAGNOSIS — E78.5 DYSLIPIDEMIA: ICD-10-CM

## 2025-03-10 DIAGNOSIS — I48.0 AF (PAROXYSMAL ATRIAL FIBRILLATION): Primary | ICD-10-CM

## 2025-03-10 DIAGNOSIS — I50.32 CHRONIC HEART FAILURE WITH PRESERVED EJECTION FRACTION (HFPEF): Primary | ICD-10-CM

## 2025-03-10 DIAGNOSIS — I10 ESSENTIAL HYPERTENSION: ICD-10-CM

## 2025-03-10 DIAGNOSIS — T82.539A LEAKAGE OF WATCHMAN LEFT ATRIAL APPENDAGE CLOSURE DEVICE: ICD-10-CM

## 2025-03-10 PROCEDURE — 1159F MED LIST DOCD IN RCRD: CPT | Performed by: NURSE PRACTITIONER

## 2025-03-10 PROCEDURE — 99214 OFFICE O/P EST MOD 30 MIN: CPT | Performed by: NURSE PRACTITIONER

## 2025-03-10 PROCEDURE — 3074F SYST BP LT 130 MM HG: CPT | Performed by: NURSE PRACTITIONER

## 2025-03-10 PROCEDURE — 1160F RVW MEDS BY RX/DR IN RCRD: CPT | Performed by: NURSE PRACTITIONER

## 2025-03-10 PROCEDURE — 3078F DIAST BP <80 MM HG: CPT | Performed by: NURSE PRACTITIONER

## 2025-03-10 NOTE — PROGRESS NOTES
Cardiac Electrophysiology Outpatient Follow Up Note            [facility] at [facility]    Follow Up Office Visit      Kelsey Avendano  [id number]  03/10/2025  [unfilled]  [unfilled]    Primary Care Physician: Sudhir Arita MD    Referred By: No ref. provider found    Subjective     Chief Complaint   Patient presents with    Atrial fibrillation with RVR       History of Present Illness:   Kelsey Avendano is a 85 y.o. female who presents to my electrophysiology clinic for follow up of atrial fibrillation.  She has a history of hemorrhoidal bleeding with associated anemia.  She underwent 9/12/2024 LAAO with Watchman. 11/12/2024 JEANETTE revealed LVEF 61-65%, well seated device.  She is now on Aspirin and Plavix.  She has had no further bleeding issues.     She denies chest pain, chest pressure, chest heaviness, shortness of breath. She does have palpitations. She complains of palpitations worse at night.  She states she coughs when this happens. Sometimes she coughs and terminates sensation.     Past Medical History:   Diagnosis Date    A-fib     Anemia     Annual GYN exam w/o problem 04/24/2017    Anxiety     Aortic insufficiency     Arthritis     B12 deficiency     Bradycardia     Bronchitis     CHF (congestive heart failure)     Dental root implant present     x3- bottom and top    Depression 2006    Dizzy     GERD (gastroesophageal reflux disease)     History of shingles     2008- face    History of transfusion     about 2 years ago was told had to have transfusion but doesnt recall details with it - no reaction recalled    Hypertension     Insomnia     Itching     bilat arm    Leaky heart valve     thinks r/t rhuematic fever possible    Lupus     MRSA (methicillin resistant staph aureus) culture positive     2017    Osteoporosis     Renal failure     Rheumatic fever     as child    SOBOE (shortness of breath on exertion)     Stroke     TIA    TIA (transient ischemic  attack)     2017    Wears glasses     readers       Past Surgical History:   Procedure Laterality Date    ANTERIOR AND POSTERIOR VAGINAL REPAIR  04/2008    Along with vaginal vault suspension and PULLP    APPENDECTOMY      posisble with hysterectomy    ATRIAL APPENDAGE EXCLUSION LEFT WITH TRANSESOPHAGEAL ECHOCARDIOGRAM N/A 10/9/2024    Procedure: Atrial Appendage Occlusion;  Surgeon: Jacek Breen DO;  Location:  [facility];  Service: Cardiology;  Laterality: N/A;    BILATERAL SALPINGO OOPHORECTOMY Bilateral 1984    BREAST EXCISIONAL BIOPSY Right 1989    benign    CARDIAC CATHETERIZATION  2008    no stents    CARDIAC ELECTROPHYSIOLOGY PROCEDURE N/A 11/28/2023    Procedure: Pacemaker DC new and Tikosyn admission in 6 weeks. Hold Eliquis one day prior. She is stopping Amiodarone today.;  Surgeon: Omar Gonzalez MD;  Location:  [facility];  Service: Cardiology;  Laterality: N/A;    CATARACT EXTRACTION, BILATERAL Bilateral     COLONOSCOPY      DEEP NECK LYMPH NODE BIOPSY / EXCISION  2002    ENDOSCOPY      KNEE ARTHROPLASTY, PARTIAL REPLACEMENT Bilateral     TOTAL ABDOMINAL HYSTERECTOMY FORDE PROCEDURE  1984    benign       Family History   Problem Relation Age of Onset    Arthritis Mother     Heart attack Mother     Diabetes Father     Heart attack Father     Heart failure Sister     Heart disease Brother     Heart failure Brother     Heart failure Sister     Breast cancer Neg Hx     Ovarian cancer Neg Hx        Social History     Socioeconomic History    Marital status:    Tobacco Use    Smoking status: Never     Passive exposure: Past    Smokeless tobacco: Never   Vaping Use    Vaping status: Never Used   Substance and Sexual Activity    Alcohol use: No    Drug use: No    Sexual activity: Defer         Current Outpatient Medications:     acetaminophen (TYLENOL) 650 MG 8 hr tablet, Take 1 tablet by mouth As Needed for Mild Pain., Disp: , Rfl:     amLODIPine (NORVASC) 5 MG  "tablet, TAKE 1 TABLET BY MOUTH 2 TIMES DAILY BLOOD PRESSURE, Disp: 60 tablet, Rfl: 6    APPLE CIDER VINEGAR PO, Take  by mouth Daily. 2 gummies, Disp: , Rfl:     aspirin 81 MG EC tablet, Take 1 tablet by mouth Daily., Disp: , Rfl:     cetirizine (zyrTEC) 10 MG tablet, Take 1 tablet by mouth Daily., Disp: , Rfl:     clopidogrel (PLAVIX) 75 MG tablet, Take 1 tablet by mouth Daily for 147 days., Disp: 147 tablet, Rfl: 0    dofetilide (TIKOSYN) 125 MCG capsule, TAKE 1 CAPSULE BY MOUTH EVERY 12 HOURS, Disp: 60 capsule, Rfl: 6    escitalopram (LEXAPRO) 20 MG tablet, Take 1 tablet by mouth Every Night., Disp: , Rfl:     ferrous gluconate 324 (37.5 Fe) MG tablet tablet, Take 1 tablet by mouth Daily., Disp: , Rfl:     furosemide (LASIX) 20 MG tablet, Take 0.5 tablets by mouth At Night As Needed. 1/2 tablet prn, Disp: , Rfl:     hydroxychloroquine (PLAQUENIL) 200 MG tablet, Take 1 tablet by mouth 2 (Two) Times a Day., Disp: , Rfl:     metoprolol tartrate (LOPRESSOR) 25 MG tablet, TAKE 1 TABLET BY MOUTH 2 (TWO) TIMES A DAY FOR HEART, Disp: 60 tablet, Rfl: 6    pantoprazole (PROTONIX) 40 MG EC tablet, Take 1 tablet by mouth 2 (two) times a day., Disp: , Rfl:     rosuvastatin (CRESTOR) 20 MG tablet, Take 1 tablet by mouth Every Night., Disp: , Rfl:     Allergies   Allergen Reactions    Maxzide [Hydrochlorothiazide W-Triamterene] Other (See Comments)     Kidney issues 06/30/20      Hydralazine Hcl Photosensitivity, Palpitations, GI Intolerance and Headache     Chest pains, headache, diarrhea    Hytrin [Terazosin] Palpitations     Visual issues- \"bright spots in eyes\" 6/2020    Nifedipine Er Rash     flushing       Objective     Vitals:    03/10/25 1305   BP: 140/68   BP Location: Right arm   Patient Position: Sitting   Pulse: 70   SpO2: 93%   Weight: 64.9 kg (143 lb)   Height: 165.1 cm (65\")     Body mass index is 23.8 kg/m².    Constitutional:       Appearance: Normal and healthy appearance. Not in distress.   Eyes:      Pupils: " Pupils are equal, round, and reactive to light.   HENT:      Head: Normocephalic and atraumatic.   Pulmonary:      Effort: Pulmonary effort is normal.      Breath sounds: Normal breath sounds.   Cardiovascular:      PMI at left midclavicular line. Normal rate. Regular rhythm. Normal S1. Normal S2.       Murmurs: There is no murmur.      No gallop.  No click. No rub.   Pulses:     Intact distal pulses.   Edema:     Peripheral edema absent.   Abdominal:      General: Abdomen is flat. Bowel sounds are normal. There is no distension.      Palpations: Abdomen is soft.   Skin:     General: Skin is warm and dry.   Neurological:      General: No focal deficit present.      Mental Status: Alert, oriented to person, place, and time and oriented to person, place and time.   Psychiatric:         Attention and Perception: Attention normal.         Mood and Affect: Mood normal.         Behavior: Behavior is cooperative.         Lab Results   Component Value Date    GLUCOSE 89 10/01/2024    CALCIUM 9.4 10/01/2024     10/01/2024    K 4.7 10/01/2024    CO2 29.0 10/01/2024     10/01/2024    BUN 24 (H) 10/01/2024    CREATININE 0.92 10/01/2024    EGFRIFNONA 54 (L) 09/25/2019    BCR 26.1 (H) 10/01/2024    ANIONGAP 8.0 10/01/2024     Lab Results   Component Value Date    WBC 4.6 01/17/2025    HGB 12.6 01/17/2025    HCT 38.8 01/17/2025    MCV 91 01/17/2025     01/17/2025     Lab Results   Component Value Date    INR 1.13 (H) 12/20/2023    INR 1.58 (H) 11/20/2023    INR 0.96 05/09/2022    PROTIME 15.0 (H) 12/20/2023    PROTIME 19.1 (H) 11/20/2023    PROTIME 13.0 05/09/2022     Lab Results   Component Value Date    TSH 5.180 (H) 08/15/2023       Cardiac Testing:     I personally viewed and interpreted the patient's EKG/Telemetry/lab data.    Procedures    Tobacco Cessation: N/A  Obstructive Sleep Apnea Screening: N/A    Advance Care Planning   ACP discussion was declined by the patient. Patient does not have an advance  directive, declines further assistance.       Assessment & Plan      Assessment & Plan  AF (paroxysmal atrial fibrillation)  9/12/2024 LAAO with Watchman  11/12/2024 JEANETTE revealed LVEF 61-65%, well seated device   She is maintained on Tikosyn. She does have a history of lupus and rheumatoid arthritis.  She is maintained on plaquenil and tylenol arthritis prn. EKG today notes QT <500.      Educated patient on QT prolongation and drug-drug interactions especially with antibiotic therapy. Continue Tikosyn at current dosage.      DEVICE INTERROGATION:  BSC: RA pacing 97%, RV pacing <1%. P wave is 4.7 mV with a threshold of 0.8 V at 0.4 msec and an impedance of 573 ohms. R wave is >25 mV with a threshold of 0.7 V at 0.4 msec and an impedance of 535 ohms. Battery voltage is 7.5 years. Events: 19 mode switches (max 2 hours 59 minutes <1% total)    The patient has had been having intermittent episodes of nocturnal palpitations.  She states this takes her breath device interrogation noted this is on correlation with mode switches.  However she does have a PVC burden of 5700.      Leakage of Watchman left atrial appendage closure device  She is currently on ASA & Plavix.  She has had no further bleeding issues.  We will deescalate antiplatelet therapy to monotherapeutic  with Aspirin 81mg po Daily.         Essential hypertension    /68.  She had just taken her medications prior to appointment             Follow Up:   Return in about 3 months (around 6/10/2025).    Thank you for allowing me to participate in the care of your patient. Please to not hesitate to contact me with additional questions or concerns.      DORIS Eaton  [facility] / [facility]

## 2025-03-10 NOTE — LETTER
March 10, 2025     Sudhir Arita MD  [address] [place] [address]    Patient: Kelsey Avendano   YOB: 1940   Date of Visit: 3/10/2025     Dear Sudhir Arita MD:       Thank you for referring Kelsey Avendano to me for evaluation. Below are the relevant portions of my assessment and plan of care.    If you have questions, please do not hesitate to call me. I look forward to following Kelsey along with you.         Sincerely,        DORIS Nelson        CC: No Recipients    Kathryn Ferrera APRN  03/10/25 1508  Sign when Signing Visit  Subjective:     Encounter Date:03/10/2025    Primary Care Physician: Sudhir Arita MD      Patient ID: Kelsey Avendano is a 85 y.o. female.    Chief Complaint:AF (paroxysmal atrial fibrillation)      PROBLEM LIST  TIA; on aspirin (not daily); right sided visual deficit:  MRI, 02/28/2018: Age-appropriate diffuse generalized cerebral atrophy with gliosis in the white matter and ill defined oat infarct in the cerebellum on the left with no acute areas of ischemia  MRA, 02/28/2018: Neck, slightly tortuous vessels consistent with atherosclerotic changes and no focal areas of stenosis. Antegrade flow noted bilaterally.  Echocardiogram, 02/28/2018: EF 60-65%, grade 2 diastolic dysfunction with mildly dilated right atrial cavity is moderate pulmonary hypertension.  Holter 3/2018: Normal sinus rhythm/sinus bradycardia at 58 bpm.  JEANETTE, 11/06/2018: EF 60%. Moderate AI, aortic valve leaflets appear rheumatic. Mild-to-moderate MR/TR. Most likely source of patient's TIA is aortic atheroma.  Paroxysmal atrial fibrillation/flutter.  Tachybradycardia syndrome  5/10/2022 echo EF 61 to 65%.  Right atrium moderately dilated, left atrium moderately volume increased.  Moderate MR, moderate TR.  RVSP 35 to 45 mmHg.  Mild LVH.  Treated at [facility] with IV diltiazem, eventually discharged on NOAC with plan for outpatient external cardioversion.  5/18/2022  "return for revisit in sinus bradycardia.  Digoxin discontinued secondary to CKD and elevated serum level.  [facility] ED presentation 8/8 with EKG revealing Atrial Fibrillation. Converted to NSR without intervention.  [facility] ED presentation 8/14 with palpitations- EKG revealing NSR.  Sinus rhythm maintained on amiodarone however symptomatic bradycardia.  Pacemaker implant, 11/28/2023: Successful implantation of a dual-chamber permanent pacemaker.   10/9/2024: 27 mm Watchman FLX pro device implantation  [id number] score equal to 7  Aortic insufficiency:  JEANETTE, 11/06/2018: EF 60%. Moderate AI, aortic valve leaflets appear rheumatic. Mild-to-moderate MR/TR.   Echocardiogram, 09/17/2019: EF 60%. Mild-to-mod AI. Trace-to-mild MR. Mild TR.  Chronic diastolic heart failure  [facility] in 2008: Reportedly normal, data insufficient  Hypertension.  Hyperlipidemia.  CKD  Hx CVA.  GERD.  Lupus.  Iron deficiency anemia  AVM cauterization per patient 2024  Anxiety/depression  Surgeries:  Partial replacement bilateral knees  Vaginal surgery  Breast biopsy  Total hysterectomy  Knee replacement        Allergies   Allergen Reactions   • Maxzide [Hydrochlorothiazide W-Triamterene] Other (See Comments)     Kidney issues 06/30/20     • Hydralazine Hcl Photosensitivity, Palpitations, GI Intolerance and Headache     Chest pains, headache, diarrhea   • Hytrin [Terazosin] Palpitations     Visual issues- \"bright spots in eyes\" 6/2020   • Nifedipine Er Rash     flushing         Current Outpatient Medications:   •  acetaminophen (TYLENOL) 650 MG 8 hr tablet, Take 1 tablet by mouth As Needed for Mild Pain., Disp: , Rfl:   •  amLODIPine (NORVASC) 5 MG tablet, TAKE 1 TABLET BY MOUTH 2 TIMES DAILY BLOOD PRESSURE, Disp: 60 tablet, Rfl: 6  •  APPLE CIDER VINEGAR PO, Take  by mouth Daily. 2 gummies, Disp: , Rfl:   •  aspirin 81 MG EC tablet, Take 1 tablet by mouth Daily., Disp: , Rfl:   •  cetirizine (zyrTEC) 10 MG tablet, Take 1 tablet by " mouth Daily., Disp: , Rfl:   •  clopidogrel (PLAVIX) 75 MG tablet, Take 1 tablet by mouth Daily for 147 days., Disp: 147 tablet, Rfl: 0  •  dofetilide (TIKOSYN) 125 MCG capsule, TAKE 1 CAPSULE BY MOUTH EVERY 12 HOURS, Disp: 60 capsule, Rfl: 6  •  escitalopram (LEXAPRO) 20 MG tablet, Take 1 tablet by mouth Every Night., Disp: , Rfl:   •  ferrous gluconate 324 (37.5 Fe) MG tablet tablet, Take 1 tablet by mouth Daily., Disp: , Rfl:   •  furosemide (LASIX) 20 MG tablet, Take 0.5 tablets by mouth At Night As Needed. 1/2 tablet prn, Disp: , Rfl:   •  hydroxychloroquine (PLAQUENIL) 200 MG tablet, Take 1 tablet by mouth 2 (Two) Times a Day., Disp: , Rfl:   •  metoprolol tartrate (LOPRESSOR) 25 MG tablet, TAKE 1 TABLET BY MOUTH 2 (TWO) TIMES A DAY FOR HEART, Disp: 60 tablet, Rfl: 6  •  pantoprazole (PROTONIX) 40 MG EC tablet, Take 1 tablet by mouth 2 (two) times a day., Disp: , Rfl:   •  rosuvastatin (CRESTOR) 20 MG tablet, Take 1 tablet by mouth Every Night., Disp: , Rfl:         History of Present Illness    Patient is an 85-year-old  female who presents today for follow-up of aortic insufficiency, chronic diastolic heart failure and cardiac risk factors.  Since last being seen patient notes overall doing well.  She denies any chest pain, pressure, tightness.  Denies any increasing shortness of breath.  She continues to follow with electrophysiology regarding her Tikosyn and device.  No reported syncope, presyncope, or edema.  Overall feels well from cardiac standpoint.    The following portions of the patient's history were reviewed and updated as appropriate: allergies, current medications, past family history, past medical history, past social history, past surgical history and problem list.      Social History     Tobacco Use   • Smoking status: Never     Passive exposure: Past   • Smokeless tobacco: Never   Vaping Use   • Vaping status: Never Used   Substance Use Topics   • Alcohol use: No   • Drug use: No  "        ROS       Objective:   /56 (BP Location: Right arm, Patient Position: Sitting, Cuff Size: Adult)   Pulse 70   Ht 165.1 cm (65\")   Wt 64.7 kg (142 lb 9.6 oz)   SpO2 98%   BMI 23.73 kg/m²         Vitals reviewed.   Constitutional:       Appearance: Well-developed and not in distress.   Neck:      Vascular: No JVD.      Trachea: No tracheal deviation.   Pulmonary:      Effort: Pulmonary effort is normal.      Breath sounds: Normal breath sounds.   Cardiovascular:      Normal rate. Regular rhythm.      Murmurs: There is no murmur.   Edema:     Peripheral edema absent.   Musculoskeletal:         General: No deformity. Neurological:      Mental Status: Alert and oriented to person, place, and time.         Procedures          Assessment:   Assessment & Plan     Diagnoses and all orders for this visit:    1. Chronic heart failure with preserved ejection fraction (HFpEF) (Primary), stable.  No evidence of volume overload.  Lasix as needed.    2. Essential hypertension, well-controlled.  On beta-blocker and amlodipine.    3. Dyslipidemia, stable.  On statin.  Labs with primary care.      Plan:  Patient is overall stable from cardiac standpoint.  Continue current cardiac medications.  Follow-up in 1 years time or sooner if needed.       DORIS Nelson     Advance Care Planning  ACP discussion was held with the patient during this visit. Patient does not have an advance directive, declines further assistance.        Dictated utilizing Dragon dictation  "

## 2025-03-10 NOTE — PROGRESS NOTES
Subjective:     Encounter Date:03/10/2025    Primary Care Physician: Sudhir Arita MD      Patient ID: Kelsey Avendano is a 85 y.o. female.    Chief Complaint:AF (paroxysmal atrial fibrillation)      PROBLEM LIST  TIA; on aspirin (not daily); right sided visual deficit:  MRI, 02/28/2018: Age-appropriate diffuse generalized cerebral atrophy with gliosis in the white matter and ill defined oat infarct in the cerebellum on the left with no acute areas of ischemia  MRA, 02/28/2018: Neck, slightly tortuous vessels consistent with atherosclerotic changes and no focal areas of stenosis. Antegrade flow noted bilaterally.  Echocardiogram, 02/28/2018: EF 60-65%, grade 2 diastolic dysfunction with mildly dilated right atrial cavity is moderate pulmonary hypertension.  Holter 3/2018: Normal sinus rhythm/sinus bradycardia at 58 bpm.  JEANETTE, 11/06/2018: EF 60%. Moderate AI, aortic valve leaflets appear rheumatic. Mild-to-moderate MR/TR. Most likely source of patient's TIA is aortic atheroma.  Paroxysmal atrial fibrillation/flutter.  Tachybradycardia syndrome  5/10/2022 echo EF 61 to 65%.  Right atrium moderately dilated, left atrium moderately volume increased.  Moderate MR, moderate TR.  RVSP 35 to 45 mmHg.  Mild LVH.  Treated at [facility] with IV diltiazem, eventually discharged on NOAC with plan for outpatient external cardioversion.  5/18/2022 return for revisit in sinus bradycardia.  Digoxin discontinued secondary to CKD and elevated serum level.  [facility] ED presentation 8/8 with EKG revealing Atrial Fibrillation. Converted to NSR without intervention.  [facility] ED presentation 8/14 with palpitations- EKG revealing NSR.  Sinus rhythm maintained on amiodarone however symptomatic bradycardia.  Pacemaker implant, 11/28/2023: Successful implantation of a dual-chamber permanent pacemaker.   10/9/2024: 27 mm Watchman FLX pro device implantation  [id number] score equal to 7  Aortic insufficiency:  JEANETTE,  "11/06/2018: EF 60%. Moderate AI, aortic valve leaflets appear rheumatic. Mild-to-moderate MR/TR.   Echocardiogram, 09/17/2019: EF 60%. Mild-to-mod AI. Trace-to-mild MR. Mild TR.  Chronic diastolic heart failure  [facility] in 2008: Reportedly normal, data insufficient  Hypertension.  Hyperlipidemia.  CKD  Hx CVA.  GERD.  Lupus.  Iron deficiency anemia  AVM cauterization per patient 2024  Anxiety/depression  Surgeries:  Partial replacement bilateral knees  Vaginal surgery  Breast biopsy  Total hysterectomy  Knee replacement        Allergies   Allergen Reactions    Maxzide [Hydrochlorothiazide W-Triamterene] Other (See Comments)     Kidney issues 06/30/20      Hydralazine Hcl Photosensitivity, Palpitations, GI Intolerance and Headache     Chest pains, headache, diarrhea    Hytrin [Terazosin] Palpitations     Visual issues- \"bright spots in eyes\" 6/2020    Nifedipine Er Rash     flushing         Current Outpatient Medications:     acetaminophen (TYLENOL) 650 MG 8 hr tablet, Take 1 tablet by mouth As Needed for Mild Pain., Disp: , Rfl:     amLODIPine (NORVASC) 5 MG tablet, TAKE 1 TABLET BY MOUTH 2 TIMES DAILY BLOOD PRESSURE, Disp: 60 tablet, Rfl: 6    APPLE CIDER VINEGAR PO, Take  by mouth Daily. 2 gummies, Disp: , Rfl:     aspirin 81 MG EC tablet, Take 1 tablet by mouth Daily., Disp: , Rfl:     cetirizine (zyrTEC) 10 MG tablet, Take 1 tablet by mouth Daily., Disp: , Rfl:     clopidogrel (PLAVIX) 75 MG tablet, Take 1 tablet by mouth Daily for 147 days., Disp: 147 tablet, Rfl: 0    dofetilide (TIKOSYN) 125 MCG capsule, TAKE 1 CAPSULE BY MOUTH EVERY 12 HOURS, Disp: 60 capsule, Rfl: 6    escitalopram (LEXAPRO) 20 MG tablet, Take 1 tablet by mouth Every Night., Disp: , Rfl:     ferrous gluconate 324 (37.5 Fe) MG tablet tablet, Take 1 tablet by mouth Daily., Disp: , Rfl:     furosemide (LASIX) 20 MG tablet, Take 0.5 tablets by mouth At Night As Needed. 1/2 tablet prn, Disp: , Rfl:     hydroxychloroquine (PLAQUENIL) 200 MG tablet, " "Take 1 tablet by mouth 2 (Two) Times a Day., Disp: , Rfl:     metoprolol tartrate (LOPRESSOR) 25 MG tablet, TAKE 1 TABLET BY MOUTH 2 (TWO) TIMES A DAY FOR HEART, Disp: 60 tablet, Rfl: 6    pantoprazole (PROTONIX) 40 MG EC tablet, Take 1 tablet by mouth 2 (two) times a day., Disp: , Rfl:     rosuvastatin (CRESTOR) 20 MG tablet, Take 1 tablet by mouth Every Night., Disp: , Rfl:         History of Present Illness    Patient is an 85-year-old  female who presents today for follow-up of aortic insufficiency, chronic diastolic heart failure and cardiac risk factors.  Since last being seen patient notes overall doing well.  She denies any chest pain, pressure, tightness.  Denies any increasing shortness of breath.  She continues to follow with electrophysiology regarding her Tikosyn and device.  No reported syncope, presyncope, or edema.  Overall feels well from cardiac standpoint.    The following portions of the patient's history were reviewed and updated as appropriate: allergies, current medications, past family history, past medical history, past social history, past surgical history and problem list.      Social History     Tobacco Use    Smoking status: Never     Passive exposure: Past    Smokeless tobacco: Never   Vaping Use    Vaping status: Never Used   Substance Use Topics    Alcohol use: No    Drug use: No         ROS       Objective:   /56 (BP Location: Right arm, Patient Position: Sitting, Cuff Size: Adult)   Pulse 70   Ht 165.1 cm (65\")   Wt 64.7 kg (142 lb 9.6 oz)   SpO2 98%   BMI 23.73 kg/m²         Vitals reviewed.   Constitutional:       Appearance: Well-developed and not in distress.   Neck:      Vascular: No JVD.      Trachea: No tracheal deviation.   Pulmonary:      Effort: Pulmonary effort is normal.      Breath sounds: Normal breath sounds.   Cardiovascular:      Normal rate. Regular rhythm.      Murmurs: There is no murmur.   Edema:     Peripheral edema absent. "   Musculoskeletal:         General: No deformity. Neurological:      Mental Status: Alert and oriented to person, place, and time.         Procedures          Assessment:   Assessment & Plan      Diagnoses and all orders for this visit:    1. Chronic heart failure with preserved ejection fraction (HFpEF) (Primary), stable.  No evidence of volume overload.  Lasix as needed.    2. Essential hypertension, well-controlled.  On beta-blocker and amlodipine.    3. Dyslipidemia, stable.  On statin.  Labs with primary care.      Plan:  Patient is overall stable from cardiac standpoint.  Continue current cardiac medications.  Follow-up in 1 years time or sooner if needed.       DORIS Nelson     Advance Care Planning   ACP discussion was held with the patient during this visit. Patient does not have an advance directive, declines further assistance.        Dictated utilizing Dragon dictation

## 2025-04-28 ENCOUNTER — TELEPHONE (OUTPATIENT)
Dept: CARDIOLOGY | Facility: CLINIC | Age: 85
End: 2025-04-28

## 2025-04-28 NOTE — TELEPHONE ENCOUNTER
"  Caller: Kelsey Avendano \"Pat\"    Relationship: Self    Best call back number: [phone]    What is the best time to reach you: ANYTIME    Who are you requesting to speak with (clinical staff, provider,  specific staff member): ANYONE    What was the call regarding: PATIENT WOULD LIKE TO KNOW IF IT IS OKAY FOR HER TO GET LASER HAIR REMOVAL DONE WITH HER PACEMAKER.       "

## 2025-05-09 ENCOUNTER — TELEMEDICINE (OUTPATIENT)
Dept: CARDIOLOGY | Facility: HOSPITAL | Age: 85
End: 2025-05-09
Payer: MEDICARE

## 2025-05-09 DIAGNOSIS — Z95.818 PRESENCE OF WATCHMAN LEFT ATRIAL APPENDAGE CLOSURE DEVICE: ICD-10-CM

## 2025-05-09 DIAGNOSIS — I48.19 PERSISTENT ATRIAL FIBRILLATION: Primary | ICD-10-CM

## 2025-05-09 NOTE — PATIENT INSTRUCTIONS
- Aspirin 81mg daily    - Office will call for final Watchman imaging with esophageal echocardiogram around October 2025

## 2025-05-09 NOTE — PROGRESS NOTES
Mode of visit: Video  Location of patient:[facility]   Location of provider: [facility]  You have chosen to receive care through a telehealth visit.   Do you consent to the use video/audio connection for your medical care today?: Yes  This visit included audio and video interaction. No technical issues occurred during the visit.       Chief Complaint  Atrial Fibrillation ([facility] 6 month follow-up)    [facility]  [facility] Center  Telemedicine note    This was a video enabled telemedicine encounter.    Subjective    History of Present Illness {CC  Problem List  Visit  Diagnosis   Encounters  Notes  Medications  Labs  Result Review Imaging  Media :23}     Kelsey Avendano, 85 y.o. female presents to [facility] telemedicine visit for Atrial Fibrillation ([facility] 6 month follow-up)    PROBLEM LIST  TIA; on aspirin (not daily); right sided visual deficit:  MRI, 02/28/2018: Age-appropriate diffuse generalized cerebral atrophy with gliosis in the white matter and ill defined oat infarct in the cerebellum on the left with no acute areas of ischemia  MRA, 02/28/2018: Neck, slightly tortuous vessels consistent with atherosclerotic changes and no focal areas of stenosis. Antegrade flow noted bilaterally.  Echocardiogram, 02/28/2018: EF 60-65%, grade 2 diastolic dysfunction with mildly dilated right atrial cavity is moderate pulmonary hypertension.  Holter 3/2018: Normal sinus rhythm/sinus bradycardia at 58 bpm.  JEANETTE, 11/06/2018: EF 60%. Moderate AI, aortic valve leaflets appear rheumatic. Mild-to-moderate MR/TR. Most likely source of patient's TIA is aortic atheroma.  Paroxysmal atrial fibrillation/flutter.  Tachybradycardia syndrome  5/10/2022 echo EF 61 to 65%.  Right atrium moderately dilated, left atrium moderately volume increased.  Moderate MR, moderate TR.  RVSP 35 to 45 mmHg.  Mild LVH.  Treated at [facility] with IV diltiazem, eventually discharged on  NOAC with plan for outpatient external cardioversion.  5/18/2022 return for revisit in sinus bradycardia.  Digoxin discontinued secondary to CKD and elevated serum level.  [facility] ED presentation 8/8 with EKG revealing Atrial Fibrillation. Converted to NSR without intervention.  [facility] ED presentation 8/14 with palpitations- EKG revealing NSR.  Sinus rhythm maintained on amiodarone however symptomatic bradycardia.  Pacemaker implant, 11/28/2023: Successful implantation of a dual-chamber permanent pacemaker.   10/9/2024: 27 mm Watchman FLX pro device implantation  [id number] score equal to 7  Aortic insufficiency:  JEANETTE, 11/06/2018: EF 60%. Moderate AI, aortic valve leaflets appear rheumatic. Mild-to-moderate MR/TR.   Echocardiogram, 09/17/2019: EF 60%. Mild-to-mod AI. Trace-to-mild MR. Mild TR.  Chronic diastolic heart failure  LHC in 2008: Reportedly normal, data insufficient  Hypertension.  Hyperlipidemia.  CKD  Hx CVA.  GERD.  Lupus.  Iron deficiency anemia  AVM cauterization per patient 2024  Anxiety/depression  Surgeries:  Partial replacement bilateral knees  Vaginal surgery  Breast biopsy  Total hysterectomy  Knee replacement    History of Present Illness:    6 Month LAAO Follow-up    Patient presents today for follow-up s/p watchman placement by Dr. Breen on 10/9/24.  Patient has completed follow-up JEANETTE that revealed a well-positioned watchman device with no anais-prosthetic leak/DRT present.  Reports no other cardiac devices or procedures in the past year that would require DAPT. Patient previously instructed to transition to aspirin monotherapy at last EP appointment.       Discussed 1 year LAAO follow-up with planned JEANETTE around 1 year anniversary date of LAAO implant.  1 year JEANETTE ordered per LAAO protocol.  Patient receptive of plan, appreciative of call.         Objective     Vital Signs:   There were no vitals filed for this visit.  There is no height or weight on file to calculate  BMI.  Physical Exam  Constitutional:       Appearance: Normal appearance.   HENT:      Head: Normocephalic.   Pulmonary:      Effort: Pulmonary effort is normal. No respiratory distress.   Neurological:      Mental Status: She is alert.   Psychiatric:         Mood and Affect: Mood normal.         Behavior: Behavior normal.         Thought Content: Thought content normal.        Data Reviewed:{ Labs  Result Review  Imaging  Med Tab  Media :23}     Adult Transesophageal Echo 3D (JEANETTE) W/ Cont If Necessary Per Protocol (11/12/2024 10:06)  EP/CRM Study (10/09/2024 10:40)      Assessment and Plan {CC Problem List  Visit Diagnosis  ROS  Review (Popup)  Health Maintenance  Quality  BestPractice  Medications  SmartSets  SnapShot Encounters  Media :23}     This visit has been scheduled as a video visit.     1. Persistent atrial fibrillation/Presence of Watchman left atrial appendage closure device  -s/p watchman placement by Dr. Breen on 10/9/24.    -follow-up JEANETTE revealed a well-positioned watchman device with no anais-prosthetic leak/DRT present.    -Patient previously instructed to transition to aspirin monotherapy at last EP appointment.     -Continue ASA monotherapy as prescribed  -Discussed 1 year LAAO follow-up with planned JEANETTE around 1 year anniversary date of LAAO implant.  1 year JEANETTE ordered per LAAO protocol.        Follow Up {Instructions Charge Capture  Follow-up Communications :23}   Return if symptoms worsen or fail to improve.      Patient was given instructions and counseling regarding her condition or for health maintenance advice. Please see specific information pulled into the AVS if appropriate.  Patient was instructed to call the Heart and Valve Center with any questions, concerns, or worsening symptoms.    *Please note that portions of this note were completed with a voice recognition program. Efforts were made to edit the dictations, but occasionally words are mistranscribed.

## 2025-06-04 ENCOUNTER — TELEPHONE (OUTPATIENT)
Dept: CARDIOLOGY | Facility: CLINIC | Age: 85
End: 2025-06-04

## 2025-06-04 NOTE — TELEPHONE ENCOUNTER
"    Name: Kelsey Avendano E \"Pat\"    Relationship: Self    Best Callback Number: [phone]     Incoming call to the Hub, requesting to  Reschedule their Other: BSC 3 MO FU  appointment on 06/09/25.     Per Hub workflow, further review is needed before the task can be completed.    Result of Call: Please reach out to the patient to reschedule    "

## 2025-06-05 RX ORDER — METOPROLOL TARTRATE 25 MG/1
TABLET, FILM COATED ORAL
Qty: 60 TABLET | Refills: 6 | Status: SHIPPED | OUTPATIENT
Start: 2025-06-05

## 2025-06-09 ENCOUNTER — OFFICE VISIT (OUTPATIENT)
Dept: CARDIOLOGY | Facility: CLINIC | Age: 85
End: 2025-06-09
Payer: MEDICARE

## 2025-06-09 VITALS
DIASTOLIC BLOOD PRESSURE: 68 MMHG | WEIGHT: 144.4 LBS | OXYGEN SATURATION: 95 % | HEART RATE: 67 BPM | BODY MASS INDEX: 23.21 KG/M2 | HEIGHT: 66 IN | SYSTOLIC BLOOD PRESSURE: 128 MMHG

## 2025-06-09 DIAGNOSIS — J40 BRONCHITIS: ICD-10-CM

## 2025-06-09 DIAGNOSIS — I48.0 AF (PAROXYSMAL ATRIAL FIBRILLATION): Primary | ICD-10-CM

## 2025-06-09 DIAGNOSIS — I10 ESSENTIAL HYPERTENSION: ICD-10-CM

## 2025-06-09 PROCEDURE — 3074F SYST BP LT 130 MM HG: CPT | Performed by: NURSE PRACTITIONER

## 2025-06-09 PROCEDURE — 99214 OFFICE O/P EST MOD 30 MIN: CPT | Performed by: NURSE PRACTITIONER

## 2025-06-09 PROCEDURE — 3078F DIAST BP <80 MM HG: CPT | Performed by: NURSE PRACTITIONER

## 2025-06-09 PROCEDURE — 93280 PM DEVICE PROGR EVAL DUAL: CPT | Performed by: NURSE PRACTITIONER

## 2025-06-09 RX ORDER — PREDNISONE 20 MG/1
40 TABLET ORAL DAILY
Qty: 8 TABLET | Refills: 0 | Status: SHIPPED | OUTPATIENT
Start: 2025-06-09 | End: 2025-06-13

## 2025-06-09 RX ORDER — CLINDAMYCIN HYDROCHLORIDE 300 MG/1
300 CAPSULE ORAL 3 TIMES DAILY
Qty: 21 CAPSULE | Refills: 0 | Status: SHIPPED | OUTPATIENT
Start: 2025-06-09

## 2025-06-09 NOTE — PROGRESS NOTES
Cardiac Electrophysiology Outpatient Follow Up Note            [facility]    Follow Up Office Visit      Kelsey Avendano  [id number]  06/09/2025  [unfilled]  [unfilled]    Primary Care Physician: Sudhir Arita MD    Referred By: No ref. provider found    Subjective     Chief Complaint   Patient presents with    AF (paroxysmal atrial fibrillation)       History of Present Illness:   Kelsey Avendano is a 85 y.o. female who presents to my electrophysiology clinic for follow up of atrial fibrillation.  She has a history of hemorrhoidal bleeding with associated anemia.  She underwent 9/12/2024 LAAO with Watchman. 11/12/2024 JEANETTE revealed LVEF 61-65%, well seated device.  She is now on Aspirin She is chronically maintained on Tikosyn for arrhythmia suppression    Since the patients last visit she states she has been fatigued, short of breath over the last two weeks ago.  She did this last year around this time, but can't recall how she was treated. She has had hoarseness with dry cough. The patient denies chest pain, chest pressure, chest heaviness, palpitations, edema, syncope, presyncope.         Past Medical History:   Diagnosis Date    A-fib     Abnormal ECG 07/2023    Dr office/hospital    Anemia     Annual GYN exam w/o problem 04/24/2017    Anxiety     Aortic insufficiency     Arthritis     Asthma ???    B12 deficiency     Bradycardia     Bronchitis     CHF (congestive heart failure)     Congenital heart disease     Dont know    Dental root implant present     x3- bottom and top    Depression 2006    Dizzy     GERD (gastroesophageal reflux disease)     Heart murmur     History of shingles     2008- face    History of transfusion     about 2 years ago was told had to have transfusion but doesnt recall details with it - no reaction recalled    Hyperlipidemia 2022    Prescribedd medication    Hypertension     Insomnia     Itching     bilat arm    Leaky  heart valve     thinks r/t rhuematic fever possible    Lupus     Mitral valve prolapse ???    MRSA (methicillin resistant staph aureus) culture positive     2017    Osteoporosis     Renal failure     Rheumatic fever     as child    SOBOE (shortness of breath on exertion)     Stroke     TIA    TIA (transient ischemic attack)     2017    Wears glasses     readers       Past Surgical History:   Procedure Laterality Date    ANTERIOR AND POSTERIOR VAGINAL REPAIR  04/2008    Along with vaginal vault suspension and PULLP    APPENDECTOMY      posisble with hysterectomy    ATRIAL APPENDAGE EXCLUSION LEFT WITH TRANSESOPHAGEAL ECHOCARDIOGRAM N/A 10/09/2024    Procedure: Atrial Appendage Occlusion;  Surgeon: Jacek Breen DO;  Location:  [facility];  Service: Cardiology;  Laterality: N/A;    BILATERAL SALPINGO OOPHORECTOMY Bilateral 1984    BREAST EXCISIONAL BIOPSY Right 1989    benign    CARDIAC CATHETERIZATION  2008    no stents    CARDIAC DEFIBRILLATOR PLACEMENT  11/28/202312/14/23    CARDIAC ELECTROPHYSIOLOGY PROCEDURE N/A 11/28/2023    Procedure: Pacemaker DC new and Tikosyn admission in 6 weeks. Hold Eliquis one day prior. She is stopping Amiodarone today.;  Surgeon: Omar Gonzalez MD;  Location:  [facility];  Service: Cardiology;  Laterality: N/A;    CATARACT EXTRACTION, BILATERAL Bilateral     COLONOSCOPY      DEEP NECK LYMPH NODE BIOPSY / EXCISION  2002    ENDOSCOPY      INSERT / REPLACE / REMOVE PACEMAKER  12/14/23    KNEE ARTHROPLASTY, PARTIAL REPLACEMENT Bilateral     TOTAL ABDOMINAL HYSTERECTOMY FORDE PROCEDURE  1984    benign       Family History   Problem Relation Age of Onset    Arthritis Mother     Heart attack Mother     Diabetes Father     Heart attack Father         Congestive heart failure    Heart failure Sister     Hypertension Sister     Heart disease Brother     Heart failure Brother     Heart failure Sister     Breast cancer Neg Hx     Ovarian cancer Neg Hx         Social History     Socioeconomic History    Marital status:    Tobacco Use    Smoking status: Never     Passive exposure: Past    Smokeless tobacco: Never   Vaping Use    Vaping status: Never Used   Substance and Sexual Activity    Alcohol use: Never    Drug use: Never    Sexual activity: Not Currently     Comment:          Current Outpatient Medications:     acetaminophen (TYLENOL) 650 MG 8 hr tablet, Take 1 tablet by mouth As Needed for Mild Pain., Disp: , Rfl:     amLODIPine (NORVASC) 5 MG tablet, TAKE 1 TABLET BY MOUTH 2 TIMES DAILY BLOOD PRESSURE, Disp: 60 tablet, Rfl: 6    APPLE CIDER VINEGAR PO, Take  by mouth Daily. 2 gummies, Disp: , Rfl:     aspirin 81 MG EC tablet, Take 1 tablet by mouth Daily., Disp: , Rfl:     cetirizine (zyrTEC) 10 MG tablet, Take 1 tablet by mouth Daily., Disp: , Rfl:     dofetilide (TIKOSYN) 125 MCG capsule, TAKE 1 CAPSULE BY MOUTH EVERY 12 HOURS, Disp: 60 capsule, Rfl: 6    escitalopram (LEXAPRO) 20 MG tablet, Take 1 tablet by mouth Every Night., Disp: , Rfl:     ferrous gluconate 324 (37.5 Fe) MG tablet tablet, Take 1 tablet by mouth Daily., Disp: , Rfl:     furosemide (LASIX) 20 MG tablet, Take 0.5 tablets by mouth At Night As Needed. 1/2 tablet prn, Disp: , Rfl:     hydroxychloroquine (PLAQUENIL) 200 MG tablet, Take 1 tablet by mouth 2 (Two) Times a Day., Disp: , Rfl:     metoprolol tartrate (LOPRESSOR) 25 MG tablet, TAKE ONE TABLET BY MOUTH 2 TIMES DAILY FOR HEART, Disp: 60 tablet, Rfl: 6    pantoprazole (PROTONIX) 40 MG EC tablet, Take 1 tablet by mouth 2 (two) times a day., Disp: , Rfl:     rosuvastatin (CRESTOR) 20 MG tablet, Take 1 tablet by mouth Every Night., Disp: , Rfl:     Allergies   Allergen Reactions    Maxzide [Hydrochlorothiazide-Triamterene] Other (See Comments)     Kidney issues 06/30/20      Hydralazine Hcl Photosensitivity, Palpitations, GI Intolerance and Headache     Chest pains, headache, diarrhea    Hytrin [Terazosin] Palpitations      "Visual issues- \"bright spots in eyes\" 6/2020    Nifedipine Er Rash     flushing       Objective     Vitals:    06/09/25 1549   BP: 128/68   BP Location: Left arm   Patient Position: Sitting   Pulse: 67   SpO2: 95%   Weight: 65.5 kg (144 lb 6.4 oz)   Height: 166.4 cm (65.5\")     Body mass index is 23.66 kg/m².    Vitals reviewed.   Cardiovascular:      PMI at left midclavicular line. Normal rate. Regular rhythm. Normal S1. Normal S2.       Murmurs: There is no murmur.      No gallop.  No click. No rub.   Pulses:     Intact distal pulses.   Edema:     Peripheral edema absent.         Lab Results   Component Value Date    GLUCOSE 89 10/01/2024    CALCIUM 9.4 10/01/2024     10/01/2024    K 4.7 10/01/2024    CO2 29.0 10/01/2024     10/01/2024    BUN 24 (H) 10/01/2024    CREATININE 0.92 10/01/2024    EGFRIFNONA 54 (L) 09/25/2019    BCR 26.1 (H) 10/01/2024    ANIONGAP 8.0 10/01/2024     Lab Results   Component Value Date    WBC 4.6 01/17/2025    HGB 12.6 01/17/2025    HCT 38.8 01/17/2025    MCV 91 01/17/2025     01/17/2025     Lab Results   Component Value Date    INR 1.13 (H) 12/20/2023    INR 1.58 (H) 11/20/2023    INR 0.96 05/09/2022    PROTIME 15.0 (H) 12/20/2023    PROTIME 19.1 (H) 11/20/2023    PROTIME 13.0 05/09/2022     Lab Results   Component Value Date    TSH 5.180 (H) 08/15/2023       Cardiac Testing:     I personally viewed and interpreted the patient's EKG/Telemetry/lab data.    Procedures    Tobacco Cessation: N/A  Obstructive Sleep Apnea Screening: N/A    Advance Care Planning   ACP discussion was held with the patient during this visit. Patient does not have an advance directive, declines further assistance.       Assessment & Plan      Assessment & Plan  AF (paroxysmal atrial fibrillation)  She is maintained on Tikosyn.  EKG reveals paced rhythm corrected QT around 430ms.   She has a history of hemorrhoidal bleeding with associated anemia.  She underwent 9/12/2024 LAAO with Watchman. " 11/12/2024 JEANETTE revealed LVEF 61-65%, well seated device.  She is now on Aspirin.      DEVICE INTERROGATION:  BSC: RA pacing 95%, RV pacing <1%. P wave is 3.1 mV with a threshold of 0.7 V at 0.5 msec and an impedance of 517 ohms. R wave is 25 mV with a threshold of 0.8 V at 0.5 msec and an impedance of 486 ohms. Battery voltage is 8 years. Events: none          Essential hypertension    Today's /68       Bronchitis  With upper respiratory infection  Add steroid dose pack   Add antibiotic, clindamycin  If no improve seek medical attention from PCP               Follow Up:   No follow-ups on file.    Thank you for allowing me to participate in the care of your patient. Please to not hesitate to contact me with additional questions or concerns.      DORIS Eaton  [facility] / [facility]

## 2025-06-09 NOTE — TELEPHONE ENCOUNTER
PATIENT CALLED IN TO SEE IF HER APPOINTMENT WAS STILL SHOWING, I LET HER KNOW IT WAS. PATIENT STATED SHE DOES WISH TO KEEP THIS APPOINTMENT AND ASKED THAT IT IS NOT CANCELED.

## 2025-06-23 RX ORDER — DOFETILIDE 0.12 MG/1
125 CAPSULE ORAL EVERY 12 HOURS
Qty: 60 CAPSULE | Refills: 5 | Status: SHIPPED | OUTPATIENT
Start: 2025-06-23

## 2025-07-09 LAB
MC_CV_MDC_IDC_RATE_1: 160
MC_CV_MDC_IDC_ZONE_ID: 1
MDC_IDC_MSMT_BATTERY_REMAINING_LONGEVITY: 84 MO
MDC_IDC_MSMT_BATTERY_REMAINING_PERCENTAGE: 100 %
MDC_IDC_MSMT_BATTERY_STATUS: NORMAL
MDC_IDC_MSMT_LEADCHNL_RA_DTM: NORMAL
MDC_IDC_MSMT_LEADCHNL_RA_IMPEDANCE_VALUE: 521
MDC_IDC_MSMT_LEADCHNL_RA_PACING_THRESHOLD_POLARITY: NORMAL
MDC_IDC_MSMT_LEADCHNL_RA_SENSING_INTR_AMPL: 2.8
MDC_IDC_MSMT_LEADCHNL_RV_DTM: NORMAL
MDC_IDC_MSMT_LEADCHNL_RV_IMPEDANCE_VALUE: 519
MDC_IDC_MSMT_LEADCHNL_RV_PACING_THRESHOLD_POLARITY: NORMAL
MDC_IDC_MSMT_LEADCHNL_RV_SENSING_INTR_AMPL: 25
MDC_IDC_PG_IMPLANT_DTM: NORMAL
MDC_IDC_PG_MFG: NORMAL
MDC_IDC_PG_MODEL: NORMAL
MDC_IDC_PG_SERIAL: NORMAL
MDC_IDC_PG_TYPE: NORMAL
MDC_IDC_SESS_DTM: NORMAL
MDC_IDC_SESS_TYPE: NORMAL
MDC_IDC_SET_BRADY_AT_MODE_SWITCH_RATE: 170
MDC_IDC_SET_BRADY_LOWRATE: 70
MDC_IDC_SET_BRADY_MAX_SENSOR_RATE: 120
MDC_IDC_SET_BRADY_MAX_TRACKING_RATE: 120
MDC_IDC_SET_BRADY_MODE: NORMAL
MDC_IDC_SET_BRADY_PAV_DELAY: 200
MDC_IDC_SET_BRADY_SAV_DELAY: 185
MDC_IDC_SET_LEADCHNL_RA_PACING_AMPLITUDE: 2
MDC_IDC_SET_LEADCHNL_RA_PACING_POLARITY: NORMAL
MDC_IDC_SET_LEADCHNL_RA_PACING_PULSEWIDTH: 0.4
MDC_IDC_SET_LEADCHNL_RA_SENSING_POLARITY: NORMAL
MDC_IDC_SET_LEADCHNL_RA_SENSING_SENSITIVITY: 0.25
MDC_IDC_SET_LEADCHNL_RV_PACING_AMPLITUDE: 2
MDC_IDC_SET_LEADCHNL_RV_PACING_POLARITY: NORMAL
MDC_IDC_SET_LEADCHNL_RV_PACING_PULSEWIDTH: 0.4
MDC_IDC_SET_LEADCHNL_RV_SENSING_POLARITY: NORMAL
MDC_IDC_SET_LEADCHNL_RV_SENSING_SENSITIVITY: 0.6
MDC_IDC_SET_ZONE_STATUS: NORMAL
MDC_IDC_SET_ZONE_TYPE: NORMAL
MDC_IDC_STAT_AT_BURDEN_PERCENT: 1
MDC_IDC_STAT_BRADY_RA_PERCENT_PACED: 92
MDC_IDC_STAT_BRADY_RV_PERCENT_PACED: 1

## 2025-07-09 RX ORDER — AMLODIPINE BESYLATE 5 MG/1
TABLET ORAL
Qty: 60 TABLET | Refills: 7 | Status: SHIPPED | OUTPATIENT
Start: 2025-07-09

## 2025-07-14 ENCOUNTER — TRANSCRIBE ORDERS (OUTPATIENT)
Dept: ADMINISTRATIVE | Facility: HOSPITAL | Age: 85
End: 2025-07-14
Payer: MEDICARE

## 2025-07-14 DIAGNOSIS — Z12.31 SCREENING MAMMOGRAM, ENCOUNTER FOR: Primary | ICD-10-CM

## 2025-07-18 ENCOUNTER — HOSPITAL ENCOUNTER (OUTPATIENT)
Dept: MAMMOGRAPHY | Facility: HOSPITAL | Age: 85
Discharge: HOME | End: 2025-07-18
Payer: MEDICARE

## 2025-07-30 ENCOUNTER — HOSPITAL ENCOUNTER (OUTPATIENT)
Dept: MAMMOGRAPHY | Facility: HOSPITAL | Age: 85
Discharge: HOME | End: 2025-07-30
Admitting: INTERNAL MEDICINE
Payer: MEDICARE

## 2025-07-30 DIAGNOSIS — Z12.31 SCREENING MAMMOGRAM, ENCOUNTER FOR: ICD-10-CM

## 2025-07-30 PROCEDURE — 77063 BREAST TOMOSYNTHESIS BI: CPT | Performed by: RADIOLOGY

## 2025-07-30 PROCEDURE — 77063 BREAST TOMOSYNTHESIS BI: CPT

## 2025-07-30 PROCEDURE — 77067 SCR MAMMO BI INCL CAD: CPT | Performed by: RADIOLOGY

## 2025-07-30 PROCEDURE — 77067 SCR MAMMO BI INCL CAD: CPT

## 2025-08-06 RX ORDER — AMLODIPINE BESYLATE 5 MG/1
TABLET ORAL
Qty: 60 TABLET | Refills: 1 | Status: SHIPPED | OUTPATIENT
Start: 2025-08-06

## (undated) DEVICE — SOL NACL 0.9PCT 1000ML

## (undated) DEVICE — ACCESS SHEATH WITH DILATOR: Brand: WATCHMAN TRUSTEER™ ACCESS SYSTEM

## (undated) DEVICE — DOME MONITORING W BONDED STPCK BIOTRANS2

## (undated) DEVICE — CATH ULTRS NUVISION

## (undated) DEVICE — CONTRL CONTRST CHMBRD W/TBG72IN REUS

## (undated) DEVICE — INTRO SHEATH ENGAGE W/50 GW .038 9F12

## (undated) DEVICE — LIMB HOLDER, WRIST/ANKLE: Brand: DEROYAL

## (undated) DEVICE — ADULT NASAL CO2 SAMPLING WITH O2 DELIVERY CANNULA FOR CAPNOFLEX MODULE: Brand: VITAL SIGNS™

## (undated) DEVICE — SYS CLS VASC/VENI VASCADE/MVP 10TO12F XL

## (undated) DEVICE — DRSNG SURESITE123 4X4.8IN

## (undated) DEVICE — CATH DIAG EXPO .052 PIG145 6F 110CM

## (undated) DEVICE — CAUTERY TIP POLISHER: Brand: DEVON

## (undated) DEVICE — SET PRIMARY GRVTY 10DP MALE LL 104IN

## (undated) DEVICE — ST EXT IV SMARTSITE PINCH/CLMP 5ML 46CM

## (undated) DEVICE — ELECTRD RETRN/GRND MEGADYNE SGL/PLT W/CORD 9FT DISP

## (undated) DEVICE — GW DIAG .032

## (undated) DEVICE — Device: Brand: MEDEX

## (undated) DEVICE — ADULT, W/LG. BACK PAD, RADIOTRANSPARENT ELEMENT AND LEAD WIRE COMPATIBLE W/: Brand: DEFIBRILLATION ELECTRODES

## (undated) DEVICE — ST INF PRI SMRTSTE 20DRP 2VLV 24ML 117

## (undated) DEVICE — DECANT BG O JET

## (undated) DEVICE — ST EXT IV SMRTSTE 2VLV FIX M LL 6ML 41

## (undated) DEVICE — TBG PENCL TELESCP MEGADYNE SMOKE EVAC 10FT

## (undated) DEVICE — KT MANIFLD EP

## (undated) DEVICE — 3M™ STERI-STRIP™ REINFORCED ADHESIVE SKIN CLOSURES, R1547, 1/2 IN X 4 IN (12 MM X 100 MM), 6 STRIPS/ENVELOPE: Brand: 3M™ STERI-STRIP™

## (undated) DEVICE — INTRO TEAR AWAY/LVD W/SD PRT 7F 13CM

## (undated) DEVICE — MEDI-VAC YANKAUER SUCTION HANDLE W/BULBOUS TIP: Brand: CARDINAL HEALTH

## (undated) DEVICE — 1 X VERSACROSS CONNECT TRANSSEPTAL DILATOR (INCLUDING 1 X J-TIP MECHANICAL GUIDEWIRE); 1 X VERSACROSS RF WIRE (INCLUDING 1 X CONNECTOR CABLE (SINGLE USE)); 1 X DISPERSIVE ELECTRODE: Brand: VERSACROSS CONNECT LAAC ACCESS SOLUTION

## (undated) DEVICE — IRRIGATOR BULB ASEPTO 60CC STRL

## (undated) DEVICE — LEX ELECTRO PHYSIOLOGY: Brand: MEDLINE INDUSTRIES, INC.

## (undated) DEVICE — TUBING, SUCTION, 1/4" X 10', STRAIGHT: Brand: MEDLINE

## (undated) DEVICE — TRAP FLD MINIVAC MEGADYNE 100ML

## (undated) DEVICE — INTRO SHEATH FAST/CATH LG/LUM 11F .038IN 12CM